# Patient Record
Sex: MALE | Race: WHITE | Employment: FULL TIME | URBAN - METROPOLITAN AREA
[De-identification: names, ages, dates, MRNs, and addresses within clinical notes are randomized per-mention and may not be internally consistent; named-entity substitution may affect disease eponyms.]

---

## 2017-02-13 ENCOUNTER — DIABETIC EDUCATION (OUTPATIENT)
Dept: ENDOCRINOLOGY CLINIC | Facility: CLINIC | Age: 52
End: 2017-02-13

## 2017-02-13 VITALS — HEIGHT: 71 IN | WEIGHT: 215 LBS | BODY MASS INDEX: 30.1 KG/M2

## 2017-02-13 DIAGNOSIS — E10.9 TYPE 1 DIABETES MELLITUS WITHOUT COMPLICATION (HCC): Primary | ICD-10-CM

## 2017-02-13 PROCEDURE — 97802 MEDICAL NUTRITION INDIV IN: CPT | Performed by: DIETITIAN, REGISTERED

## 2017-02-13 NOTE — PROGRESS NOTES
Trey Jesus  [id number] was seen for Diabetic Medical Nutrition Therapy:    Date: 2/13/2017  Start time: 10:30 End time: 11:30    Assessment: Ht 71\"  Wt 215 lb  BMI 30.00 kg/m2    HGBA1C (%)   Date Value   03/29/2013 8.6*   ----------    Type 1 diabe

## 2017-02-23 ENCOUNTER — HOSPITAL (OUTPATIENT)
Dept: OTHER | Age: 52
End: 2017-02-23
Attending: EMERGENCY MEDICINE

## 2017-02-23 LAB
GLUCOSE BLDC GLUCOMTR-MCNC: 275 MG/DL (ref 65–99)
GLUCOSE BLDC GLUCOMTR-MCNC: 336 MG/DL (ref 65–99)

## 2017-03-06 ENCOUNTER — DIABETIC EDUCATION (OUTPATIENT)
Dept: ENDOCRINOLOGY CLINIC | Facility: CLINIC | Age: 52
End: 2017-03-06

## 2017-03-06 DIAGNOSIS — E10.9 TYPE 1 DIABETES MELLITUS WITHOUT COMPLICATION (HCC): Primary | ICD-10-CM

## 2017-03-06 PROCEDURE — G0108 DIAB MANAGE TRN  PER INDIV: HCPCS | Performed by: DIETITIAN, REGISTERED

## 2017-03-06 NOTE — PROGRESS NOTES
Karsten Briseno  [id number] was seen for Saline Start:    Date: 3/6/2017  Start time: 9:00    End time: 11:30    Insulin pump:  Minimed 630G           SN [id number]  Contour Link meter    SN [id number]    Basic programming of all settings reviewed and en

## 2017-04-03 ENCOUNTER — HOSPITAL ENCOUNTER (OUTPATIENT)
Dept: GENERAL RADIOLOGY | Age: 52
Discharge: HOME | End: 2017-04-03
Attending: FAMILY MEDICINE
Payer: COMMERCIAL

## 2017-04-03 ENCOUNTER — OFFICE VISIT (OUTPATIENT)
Dept: FAMILY MEDICINE CLINIC | Facility: CLINIC | Age: 52
End: 2017-04-03

## 2017-04-03 VITALS
RESPIRATION RATE: 18 BRPM | SYSTOLIC BLOOD PRESSURE: 128 MMHG | HEIGHT: 68 IN | WEIGHT: 223 LBS | BODY MASS INDEX: 33.8 KG/M2 | OXYGEN SATURATION: 98 % | TEMPERATURE: 99 F | DIASTOLIC BLOOD PRESSURE: 78 MMHG | HEART RATE: 78 BPM

## 2017-04-03 DIAGNOSIS — J18.9 PNEUMONIA OF LEFT LOWER LOBE DUE TO INFECTIOUS ORGANISM: ICD-10-CM

## 2017-04-03 DIAGNOSIS — J18.9 PNEUMONIA OF LEFT LOWER LOBE DUE TO INFECTIOUS ORGANISM: Primary | ICD-10-CM

## 2017-04-03 PROCEDURE — 71020 XR CHEST PA + LAT CHEST (CPT=71020): CPT

## 2017-04-03 PROCEDURE — 99213 OFFICE O/P EST LOW 20 MIN: CPT | Performed by: FAMILY MEDICINE

## 2017-04-03 RX ORDER — AZITHROMYCIN 250 MG/1
TABLET, FILM COATED ORAL
Qty: 8 TABLET | Refills: 0 | Status: SHIPPED | OUTPATIENT
Start: 2017-04-03 | End: 2017-04-15 | Stop reason: ALTCHOICE

## 2017-04-03 RX ORDER — BENZONATATE 200 MG/1
200 CAPSULE ORAL EVERY 8 HOURS PRN
Qty: 30 CAPSULE | Refills: 0 | Status: SHIPPED | OUTPATIENT
Start: 2017-04-03 | End: 2017-04-15 | Stop reason: ALTCHOICE

## 2017-04-03 RX ORDER — LEVOTHYROXINE SODIUM 175 UG/1
TABLET ORAL
Refills: 5 | COMMUNITY
Start: 2017-03-07 | End: 2020-11-20

## 2017-04-03 NOTE — PROGRESS NOTES
HPI:    Patient ID: Angi Colon is a 49year old male. Cough  This is a new problem. Episode onset: 2 wks. The problem has been gradually worsening. The problem occurs every few minutes. The cough is non-productive.  Associated symptoms include chills Left eye exhibits no discharge. No scleral icterus. Neck: Normal range of motion. Neck supple. No thyromegaly present. Cardiovascular: Normal rate, regular rhythm and normal heart sounds. Pulmonary/Chest: Effort normal. No respiratory distress.  He h

## 2017-04-15 ENCOUNTER — OFFICE VISIT (OUTPATIENT)
Dept: FAMILY MEDICINE CLINIC | Facility: CLINIC | Age: 52
End: 2017-04-15

## 2017-04-15 VITALS
SYSTOLIC BLOOD PRESSURE: 116 MMHG | DIASTOLIC BLOOD PRESSURE: 68 MMHG | TEMPERATURE: 98 F | OXYGEN SATURATION: 98 % | HEART RATE: 82 BPM | RESPIRATION RATE: 18 BRPM | WEIGHT: 222 LBS | BODY MASS INDEX: 34 KG/M2

## 2017-04-15 DIAGNOSIS — J18.9 PNEUMONIA DUE TO INFECTIOUS ORGANISM, UNSPECIFIED LATERALITY, UNSPECIFIED PART OF LUNG: Primary | ICD-10-CM

## 2017-04-15 DIAGNOSIS — M65.331 TRIGGER MIDDLE FINGER OF RIGHT HAND: ICD-10-CM

## 2017-04-15 PROCEDURE — 99213 OFFICE O/P EST LOW 20 MIN: CPT | Performed by: FAMILY MEDICINE

## 2017-04-15 NOTE — PROGRESS NOTES
HPI:    Patient ID: Bon Goodwin is a 49year old male. Pneumonia  He complains of cough. This is a new problem. The current episode started 1 to 4 weeks ago. The problem has been resolved. The cough is productive of sputum.  Associated symptoms commen PHYSICAL EXAM:   Physical Exam   Constitutional: He appears well-developed and well-nourished. No distress. Eyes: Conjunctivae are normal. Right eye exhibits no discharge. Left eye exhibits no discharge. No scleral icterus.    Cardiovascular: Normal rat

## 2017-04-15 NOTE — PATIENT INSTRUCTIONS
What is Trigger Finger? Trigger finger is an inflammation of tissue inside your finger or thumb. It is also called tenosynovitis (ten-oh-sin-oh-VY-tis).  Tendons (cordlike fibers that attach muscle to bone and allow you to bend the joints) become swollen © 0930-8061 [address], [address] [place]. All rights reserved. This information is not intended as a substitute for professional medical care. Always follow your healthcare professional's instructions.         Viviane © 2752-6954 [address], [address] [place]. All rights reserved. This information is not intended as a substitute for professional medical care. Always follow your healthcare professional's instructions.

## 2017-04-24 ENCOUNTER — TELEPHONE (OUTPATIENT)
Dept: FAMILY MEDICINE CLINIC | Facility: CLINIC | Age: 52
End: 2017-04-24

## 2017-04-24 NOTE — TELEPHONE ENCOUNTER
Please call patient. Due for diabetes follow up appointment with [facility].  Also needs fasting lab work, foot exam and diabetic eye exam.

## 2017-06-12 NOTE — TELEPHONE ENCOUNTER
Pt has appt with Dr Ned Hancock on Wednesday 6/14. He will ask the eye doctor to fax over his report.   He is coming in to see Dr Fani Fernandez on 6/14/17

## 2017-06-14 ENCOUNTER — HOSPITAL ENCOUNTER (OUTPATIENT)
Dept: GENERAL RADIOLOGY | Age: 52
Discharge: HOME | End: 2017-06-14
Attending: FAMILY MEDICINE
Payer: COMMERCIAL

## 2017-06-14 ENCOUNTER — MED REC SCAN ONLY (OUTPATIENT)
Dept: FAMILY MEDICINE CLINIC | Facility: CLINIC | Age: 52
End: 2017-06-14

## 2017-06-14 ENCOUNTER — OFFICE VISIT (OUTPATIENT)
Dept: FAMILY MEDICINE CLINIC | Facility: CLINIC | Age: 52
End: 2017-06-14

## 2017-06-14 VITALS
TEMPERATURE: 99 F | SYSTOLIC BLOOD PRESSURE: 122 MMHG | DIASTOLIC BLOOD PRESSURE: 82 MMHG | RESPIRATION RATE: 16 BRPM | HEART RATE: 72 BPM | WEIGHT: 223 LBS | BODY MASS INDEX: 33.8 KG/M2 | HEIGHT: 68 IN

## 2017-06-14 DIAGNOSIS — E55.9 VITAMIN D DEFICIENCY: ICD-10-CM

## 2017-06-14 DIAGNOSIS — M65.341 TRIGGER RING FINGER OF RIGHT HAND: ICD-10-CM

## 2017-06-14 DIAGNOSIS — M54.16 LUMBAR RADICULOPATHY, CHRONIC: ICD-10-CM

## 2017-06-14 DIAGNOSIS — M65.341 TRIGGER RING FINGER OF RIGHT HAND: Primary | ICD-10-CM

## 2017-06-14 PROCEDURE — 73140 X-RAY EXAM OF FINGER(S): CPT | Performed by: FAMILY MEDICINE

## 2017-06-14 PROCEDURE — 99214 OFFICE O/P EST MOD 30 MIN: CPT | Performed by: FAMILY MEDICINE

## 2017-06-16 NOTE — PROGRESS NOTES
HPI:    Patient ID: Kayleigh Bear is a 49year old male. HPI Comments: + vit d def in the past.  Was taking vit D. But then stopped. No bone pain. No depressed mood. Finger Pain   Pain location: right 3rd finger. This is a chronic problem.  The cu discharge. Left eye exhibits no discharge. No scleral icterus. Neck: Normal range of motion. Neck supple. No thyromegaly present. Cardiovascular: Normal rate, regular rhythm and normal heart sounds.     Pulmonary/Chest: Effort normal and breath sounds n

## 2017-07-14 NOTE — ADDENDUM NOTE
Encounter addended by: Lupe Tee MA on: 7/14/2017  8:45 AM<BR>    Actions taken: Letter status changed

## 2017-07-27 LAB — VITAMIN D, 25-OH, TOTAL: 29 NG/ML (ref 30–100)

## 2018-11-30 ENCOUNTER — PATIENT OUTREACH (OUTPATIENT)
Dept: FAMILY MEDICINE CLINIC | Facility: CLINIC | Age: 53
End: 2018-11-30

## 2018-12-05 NOTE — PROGRESS NOTES
Spoke to patient to ask about eye exam this year. He states he had it done but doesn't remember where he had it done. Asked him to call back with information when he remembers.

## 2019-02-20 ENCOUNTER — PATIENT OUTREACH (OUTPATIENT)
Dept: FAMILY MEDICINE CLINIC | Facility: CLINIC | Age: 54
End: 2019-02-20

## 2019-11-27 ENCOUNTER — TELEPHONE (OUTPATIENT)
Dept: FAMILY MEDICINE CLINIC | Facility: CLINIC | Age: 54
End: 2019-11-27

## 2019-11-27 NOTE — TELEPHONE ENCOUNTER
Pt states for the past few weeks he has had SOB with exertion, bilateral knee and calf pain and pain in one heal.  No chest pain no warmth or redness. Pt would like a Saturday appt.  APpt scheduled for 12/7 advised to ER with worsening SOB, increased calf

## 2019-12-07 ENCOUNTER — APPOINTMENT (OUTPATIENT)
Dept: LAB | Age: 54
End: 2019-12-07
Attending: PHYSICIAN ASSISTANT
Payer: COMMERCIAL

## 2019-12-07 ENCOUNTER — OFFICE VISIT (OUTPATIENT)
Dept: FAMILY MEDICINE CLINIC | Facility: CLINIC | Age: 54
End: 2019-12-07
Payer: COMMERCIAL

## 2019-12-07 VITALS
WEIGHT: 232 LBS | OXYGEN SATURATION: 98 % | RESPIRATION RATE: 18 BRPM | SYSTOLIC BLOOD PRESSURE: 138 MMHG | DIASTOLIC BLOOD PRESSURE: 78 MMHG | HEIGHT: 68 IN | BODY MASS INDEX: 35.16 KG/M2 | HEART RATE: 72 BPM | TEMPERATURE: 98 F

## 2019-12-07 DIAGNOSIS — I87.2 VENOUS INSUFFICIENCY OF BOTH LOWER EXTREMITIES: ICD-10-CM

## 2019-12-07 DIAGNOSIS — R06.00 DYSPNEA ON EXERTION: ICD-10-CM

## 2019-12-07 DIAGNOSIS — I73.9 CLAUDICATION (HCC): ICD-10-CM

## 2019-12-07 DIAGNOSIS — E10.9 TYPE 1 DIABETES MELLITUS WITHOUT COMPLICATION (HCC): Primary | ICD-10-CM

## 2019-12-07 DIAGNOSIS — E03.9 HYPOTHYROIDISM, UNSPECIFIED TYPE: ICD-10-CM

## 2019-12-07 PROCEDURE — 99214 OFFICE O/P EST MOD 30 MIN: CPT | Performed by: PHYSICIAN ASSISTANT

## 2019-12-07 PROCEDURE — 36415 COLL VENOUS BLD VENIPUNCTURE: CPT | Performed by: PHYSICIAN ASSISTANT

## 2019-12-07 PROCEDURE — 84439 ASSAY OF FREE THYROXINE: CPT | Performed by: PHYSICIAN ASSISTANT

## 2019-12-07 PROCEDURE — 83036 HEMOGLOBIN GLYCOSYLATED A1C: CPT | Performed by: PHYSICIAN ASSISTANT

## 2019-12-07 PROCEDURE — 82043 UR ALBUMIN QUANTITATIVE: CPT | Performed by: PHYSICIAN ASSISTANT

## 2019-12-07 PROCEDURE — 82570 ASSAY OF URINE CREATININE: CPT | Performed by: PHYSICIAN ASSISTANT

## 2019-12-07 PROCEDURE — 82607 VITAMIN B-12: CPT | Performed by: PHYSICIAN ASSISTANT

## 2019-12-07 PROCEDURE — 80050 GENERAL HEALTH PANEL: CPT | Performed by: PHYSICIAN ASSISTANT

## 2019-12-07 PROCEDURE — 84481 FREE ASSAY (FT-3): CPT | Performed by: PHYSICIAN ASSISTANT

## 2019-12-07 PROCEDURE — 80061 LIPID PANEL: CPT | Performed by: PHYSICIAN ASSISTANT

## 2019-12-07 NOTE — PROGRESS NOTES
HPI:    Patient ID: Blayne Kelley is a 49year old male. HPI   Patient with history of type 1 diabetes and hypothyroidism presents to discuss a few concerns. For over the last year he has had bilateral calf pain and swelling.   It is getting worse over knees). Negative for gait problem. Skin: Positive for color change (hyperpigmented macules and confulent rash of the bilateral lower legs) and hair loss. Neurological: Negative for dizziness, syncope, weakness, light-headedness and headaches.    Hematol laxity. Tenderness found. Medial joint line tenderness noted. Left knee: He exhibits normal range of motion, no swelling and no effusion. No tenderness found. Right lower leg: He exhibits tenderness. Edema present.       Left lower leg: He exhibit WITH COMPRESS EXERCISE (CPT=93924,31957); Future  - US ANKLE TOE BRACHIAL INDEX BILATERAL (CPT=93922); Future    4.  Venous insufficiency of both lower extremities  Patient also has pitting edema bilaterally and hyperpigmentation of the skin consistent with

## 2019-12-12 ENCOUNTER — HOSPITAL ENCOUNTER (OUTPATIENT)
Dept: ULTRASOUND IMAGING | Facility: HOSPITAL | Age: 54
Discharge: HOME | End: 2019-12-12
Attending: PHYSICIAN ASSISTANT
Payer: COMMERCIAL

## 2019-12-12 DIAGNOSIS — I73.9 CLAUDICATION (HCC): ICD-10-CM

## 2019-12-12 PROCEDURE — 93923 UPR/LXTR ART STDY 3+ LVLS: CPT | Performed by: PHYSICIAN ASSISTANT

## 2019-12-14 ENCOUNTER — HOSPITAL ENCOUNTER (OUTPATIENT)
Dept: CV DIAGNOSTICS | Facility: HOSPITAL | Age: 54
Discharge: HOME | End: 2019-12-14
Attending: PHYSICIAN ASSISTANT
Payer: COMMERCIAL

## 2019-12-14 DIAGNOSIS — R06.00 DYSPNEA ON EXERTION: ICD-10-CM

## 2019-12-14 PROCEDURE — 93017 CV STRESS TEST TRACING ONLY: CPT | Performed by: PHYSICIAN ASSISTANT

## 2019-12-14 PROCEDURE — 93018 CV STRESS TEST I&R ONLY: CPT | Performed by: PHYSICIAN ASSISTANT

## 2019-12-14 PROCEDURE — 78452 HT MUSCLE IMAGE SPECT MULT: CPT | Performed by: PHYSICIAN ASSISTANT

## 2019-12-18 ENCOUNTER — PATIENT MESSAGE (OUTPATIENT)
Dept: FAMILY MEDICINE CLINIC | Facility: CLINIC | Age: 54
End: 2019-12-18

## 2019-12-18 NOTE — TELEPHONE ENCOUNTER
From: Missy Mixon  To: Jasmeet Madrigal PA-C  Sent: 12/18/2019 12:06 PM CST  Subject: Test Results Question    Any updates on the testing from Saturday   Thank you

## 2020-01-14 ENCOUNTER — APPOINTMENT (OUTPATIENT)
Dept: GENERAL RADIOLOGY | Facility: HOSPITAL | Age: 55
End: 2020-01-14
Payer: COMMERCIAL

## 2020-01-14 ENCOUNTER — HOSPITAL ENCOUNTER (EMERGENCY)
Facility: HOSPITAL | Age: 55
Discharge: HOME | End: 2020-01-15
Attending: EMERGENCY MEDICINE
Payer: COMMERCIAL

## 2020-01-14 ENCOUNTER — APPOINTMENT (OUTPATIENT)
Dept: CT IMAGING | Facility: HOSPITAL | Age: 55
End: 2020-01-14
Attending: EMERGENCY MEDICINE
Payer: COMMERCIAL

## 2020-01-14 DIAGNOSIS — J18.9 COMMUNITY ACQUIRED PNEUMONIA, UNSPECIFIED LATERALITY: Primary | ICD-10-CM

## 2020-01-14 DIAGNOSIS — R09.1 PLEURISY: ICD-10-CM

## 2020-01-14 LAB
ALBUMIN SERPL-MCNC: 3.5 G/DL (ref 3.4–5)
ALBUMIN/GLOB SERPL: 1 {RATIO} (ref 1–2)
ALP LIVER SERPL-CCNC: 150 U/L (ref 45–117)
ALT SERPL-CCNC: 33 U/L (ref 16–61)
ANION GAP SERPL CALC-SCNC: 8 MMOL/L (ref 0–18)
AST SERPL-CCNC: 23 U/L (ref 15–37)
BASOPHILS # BLD AUTO: 0.07 X10(3) UL (ref 0–0.2)
BASOPHILS NFR BLD AUTO: 1 %
BILIRUB SERPL-MCNC: 0.9 MG/DL (ref 0.1–2)
BUN BLD-MCNC: 21 MG/DL (ref 7–18)
BUN/CREAT SERPL: 17.1 (ref 10–20)
CALCIUM BLD-MCNC: 9.6 MG/DL (ref 8.5–10.1)
CHLORIDE SERPL-SCNC: 104 MMOL/L (ref 98–112)
CO2 SERPL-SCNC: 24 MMOL/L (ref 21–32)
CREAT BLD-MCNC: 1.23 MG/DL (ref 0.7–1.3)
D-DIMER: <0.27 UG/ML FEU (ref ?–0.53)
DEPRECATED RDW RBC AUTO: 38.2 FL (ref 35.1–46.3)
EOSINOPHIL # BLD AUTO: 0.11 X10(3) UL (ref 0–0.7)
EOSINOPHIL NFR BLD AUTO: 1.5 %
ERYTHROCYTE [DISTWIDTH] IN BLOOD BY AUTOMATED COUNT: 12.6 % (ref 11–15)
GLOBULIN PLAS-MCNC: 3.4 G/DL (ref 2.8–4.4)
GLUCOSE BLD-MCNC: 302 MG/DL (ref 70–99)
HCT VFR BLD AUTO: 45.5 % (ref 39–53)
HGB BLD-MCNC: 15.6 G/DL (ref 13–17.5)
IMM GRANULOCYTES # BLD AUTO: 0.02 X10(3) UL (ref 0–1)
IMM GRANULOCYTES NFR BLD: 0.3 %
LYMPHOCYTES # BLD AUTO: 0.54 X10(3) UL (ref 1–4)
LYMPHOCYTES NFR BLD AUTO: 7.4 %
M PROTEIN MFR SERPL ELPH: 6.9 G/DL (ref 6.4–8.2)
MCH RBC QN AUTO: 28.5 PG (ref 26–34)
MCHC RBC AUTO-ENTMCNC: 34.3 G/DL (ref 31–37)
MCV RBC AUTO: 83.2 FL (ref 80–100)
MONOCYTES # BLD AUTO: 0.87 X10(3) UL (ref 0.1–1)
MONOCYTES NFR BLD AUTO: 11.9 %
NEUTROPHILS # BLD AUTO: 5.7 X10 (3) UL (ref 1.5–7.7)
NEUTROPHILS # BLD AUTO: 5.7 X10(3) UL (ref 1.5–7.7)
NEUTROPHILS NFR BLD AUTO: 77.9 %
OSMOLALITY SERPL CALC.SUM OF ELEC: 296 MOSM/KG (ref 275–295)
PLATELET # BLD AUTO: 202 10(3)UL (ref 150–450)
POTASSIUM SERPL-SCNC: 4 MMOL/L (ref 3.5–5.1)
RBC # BLD AUTO: 5.47 X10(6)UL (ref 4.3–5.7)
SODIUM SERPL-SCNC: 136 MMOL/L (ref 136–145)
TROPONIN I SERPL-MCNC: <0.045 NG/ML (ref ?–0.04)
WBC # BLD AUTO: 7.3 X10(3) UL (ref 4–11)

## 2020-01-14 PROCEDURE — 93005 ELECTROCARDIOGRAM TRACING: CPT

## 2020-01-14 PROCEDURE — 71045 X-RAY EXAM CHEST 1 VIEW: CPT | Performed by: EMERGENCY MEDICINE

## 2020-01-14 PROCEDURE — 36415 COLL VENOUS BLD VENIPUNCTURE: CPT

## 2020-01-14 PROCEDURE — 80053 COMPREHEN METABOLIC PANEL: CPT

## 2020-01-14 PROCEDURE — 85025 COMPLETE CBC W/AUTO DIFF WBC: CPT

## 2020-01-14 PROCEDURE — 99285 EMERGENCY DEPT VISIT HI MDM: CPT

## 2020-01-14 PROCEDURE — 71275 CT ANGIOGRAPHY CHEST: CPT | Performed by: EMERGENCY MEDICINE

## 2020-01-14 PROCEDURE — 85025 COMPLETE CBC W/AUTO DIFF WBC: CPT | Performed by: EMERGENCY MEDICINE

## 2020-01-14 PROCEDURE — 84484 ASSAY OF TROPONIN QUANT: CPT

## 2020-01-14 PROCEDURE — 84484 ASSAY OF TROPONIN QUANT: CPT | Performed by: EMERGENCY MEDICINE

## 2020-01-14 PROCEDURE — 93010 ELECTROCARDIOGRAM REPORT: CPT

## 2020-01-14 PROCEDURE — 80053 COMPREHEN METABOLIC PANEL: CPT | Performed by: EMERGENCY MEDICINE

## 2020-01-14 PROCEDURE — 85379 FIBRIN DEGRADATION QUANT: CPT | Performed by: EMERGENCY MEDICINE

## 2020-01-15 VITALS
BODY MASS INDEX: 32.2 KG/M2 | HEIGHT: 71 IN | HEART RATE: 97 BPM | RESPIRATION RATE: 20 BRPM | TEMPERATURE: 100 F | OXYGEN SATURATION: 95 % | SYSTOLIC BLOOD PRESSURE: 133 MMHG | WEIGHT: 230 LBS | DIASTOLIC BLOOD PRESSURE: 71 MMHG

## 2020-01-15 LAB
ATRIAL RATE: 100 BPM
P AXIS: 46 DEGREES
P-R INTERVAL: 152 MS
Q-T INTERVAL: 328 MS
QRS DURATION: 84 MS
QTC CALCULATION (BEZET): 423 MS
R AXIS: 8 DEGREES
T AXIS: 25 DEGREES
VENTRICULAR RATE: 100 BPM

## 2020-01-15 RX ORDER — PREDNISONE 20 MG/1
40 TABLET ORAL DAILY
Qty: 10 TABLET | Refills: 0 | Status: SHIPPED | OUTPATIENT
Start: 2020-01-15 | End: 2020-01-20

## 2020-01-15 RX ORDER — LEVOFLOXACIN 750 MG/1
750 TABLET ORAL DAILY
Qty: 10 TABLET | Refills: 0 | Status: SHIPPED | OUTPATIENT
Start: 2020-01-15 | End: 2020-01-25 | Stop reason: ALTCHOICE

## 2020-01-15 RX ORDER — ALBUTEROL SULFATE 2.5 MG/3ML
2.5 SOLUTION RESPIRATORY (INHALATION) ONCE
Status: DISCONTINUED | OUTPATIENT
Start: 2020-01-15 | End: 2020-01-15

## 2020-01-15 RX ORDER — ALBUTEROL SULFATE 90 UG/1
2 AEROSOL, METERED RESPIRATORY (INHALATION) EVERY 4 HOURS PRN
Qty: 1 INHALER | Refills: 0 | Status: SHIPPED | OUTPATIENT
Start: 2020-01-15 | End: 2020-02-14

## 2020-01-15 NOTE — ED INITIAL ASSESSMENT (HPI)
A/O x 4. Daughter at bedside. Ambulatory. Patient presents with chest pain, shortness of breath, bilateral leg pain, blood in stool, and elevated accuchecks. Patient is Type 1 DM, insulin pump in place.   Reports that glucose has been running above 400 to

## 2020-01-15 NOTE — ED PROVIDER NOTES
Patient Seen in: [facility] Emergency Department      History   Patient presents with:  Chest Pain Angina    Stated Complaint: chest pain, leg pain, high sugar, blood in stool    HPI    52-year-old gentleman with a history of diabetes coming for arelis cm (5' 11\")   Wt 104.3 kg   SpO2 95%   BMI 32.08 kg/m²         Physical Exam  Vitals signs and nursing note reviewed. Constitutional:       General: He is not in acute distress. Appearance: He is well-developed. He is not toxic-appearing.    HENT: following orders were created for panel order CBC WITH DIFFERENTIAL WITH PLATELET.   Procedure                               Abnormality         Status                     ---------                               -----------         ------ along with     bronchial wall thickening seen in the right and left lower lobes, and     milder opacities in the lingula. No     pleural effusion. No pneumothorax         LUNGS/PLEURA:  No acute process.          THORACIC AORTA:  No thoracic aortic aneury MDM     Patient was brought back to the examination room immediately. The patient was then placed on a cardiac monitor and pulse oximetry was applied. Patient had an IV established and labs were drawn.     Patient EKG does not show

## 2020-01-17 ENCOUNTER — APPOINTMENT (OUTPATIENT)
Dept: LAB | Age: 55
End: 2020-01-17
Attending: FAMILY MEDICINE
Payer: COMMERCIAL

## 2020-01-17 ENCOUNTER — OFFICE VISIT (OUTPATIENT)
Dept: FAMILY MEDICINE CLINIC | Facility: CLINIC | Age: 55
End: 2020-01-17
Payer: COMMERCIAL

## 2020-01-17 VITALS
HEIGHT: 71 IN | TEMPERATURE: 98 F | DIASTOLIC BLOOD PRESSURE: 72 MMHG | WEIGHT: 230 LBS | RESPIRATION RATE: 20 BRPM | SYSTOLIC BLOOD PRESSURE: 134 MMHG | OXYGEN SATURATION: 98 % | BODY MASS INDEX: 32.2 KG/M2 | HEART RATE: 78 BPM

## 2020-01-17 DIAGNOSIS — M79.605 CHRONIC PAIN OF BOTH LOWER EXTREMITIES: ICD-10-CM

## 2020-01-17 DIAGNOSIS — J18.9 PNEUMONIA OF RIGHT LUNG DUE TO INFECTIOUS ORGANISM, UNSPECIFIED PART OF LUNG: Primary | ICD-10-CM

## 2020-01-17 DIAGNOSIS — M79.604 CHRONIC PAIN OF BOTH LOWER EXTREMITIES: ICD-10-CM

## 2020-01-17 DIAGNOSIS — G89.29 CHRONIC PAIN OF BOTH LOWER EXTREMITIES: ICD-10-CM

## 2020-01-17 LAB
ALBUMIN SERPL-MCNC: 3.2 G/DL (ref 3.4–5)
ALBUMIN/GLOB SERPL: 1 {RATIO} (ref 1–2)
ALP LIVER SERPL-CCNC: 109 U/L (ref 45–117)
ALT SERPL-CCNC: 29 U/L (ref 16–61)
ANION GAP SERPL CALC-SCNC: 5 MMOL/L (ref 0–18)
AST SERPL-CCNC: 24 U/L (ref 15–37)
BILIRUB SERPL-MCNC: 0.6 MG/DL (ref 0.1–2)
BUN BLD-MCNC: 27 MG/DL (ref 7–18)
BUN/CREAT SERPL: 23.7 (ref 10–20)
CALCIUM BLD-MCNC: 9.3 MG/DL (ref 8.5–10.1)
CHLORIDE SERPL-SCNC: 110 MMOL/L (ref 98–112)
CK SERPL-CCNC: 211 U/L (ref 39–308)
CO2 SERPL-SCNC: 26 MMOL/L (ref 21–32)
CREAT BLD-MCNC: 1.14 MG/DL (ref 0.7–1.3)
DEPRECATED HBV CORE AB SER IA-ACNC: 311.9 NG/ML (ref 30–530)
GLOBULIN PLAS-MCNC: 3.1 G/DL (ref 2.8–4.4)
GLUCOSE BLD-MCNC: 303 MG/DL (ref 70–99)
HAV IGM SER QL: 1.7 MG/DL (ref 1.6–2.6)
M PROTEIN MFR SERPL ELPH: 6.3 G/DL (ref 6.4–8.2)
OSMOLALITY SERPL CALC.SUM OF ELEC: 308 MOSM/KG (ref 275–295)
PATIENT FASTING Y/N/NP: NO
POTASSIUM SERPL-SCNC: 3.9 MMOL/L (ref 3.5–5.1)
SODIUM SERPL-SCNC: 141 MMOL/L (ref 136–145)
T4 FREE SERPL-MCNC: 1.6 NG/DL (ref 0.8–1.7)
TSI SER-ACNC: 0.37 MIU/ML (ref 0.36–3.74)

## 2020-01-17 PROCEDURE — 83876 ASSAY MYELOPEROXIDASE: CPT | Performed by: FAMILY MEDICINE

## 2020-01-17 PROCEDURE — 36415 COLL VENOUS BLD VENIPUNCTURE: CPT | Performed by: FAMILY MEDICINE

## 2020-01-17 PROCEDURE — 82550 ASSAY OF CK (CPK): CPT | Performed by: FAMILY MEDICINE

## 2020-01-17 PROCEDURE — 84439 ASSAY OF FREE THYROXINE: CPT | Performed by: FAMILY MEDICINE

## 2020-01-17 PROCEDURE — 99213 OFFICE O/P EST LOW 20 MIN: CPT | Performed by: FAMILY MEDICINE

## 2020-01-17 PROCEDURE — 86255 FLUORESCENT ANTIBODY SCREEN: CPT | Performed by: FAMILY MEDICINE

## 2020-01-17 PROCEDURE — 83735 ASSAY OF MAGNESIUM: CPT | Performed by: FAMILY MEDICINE

## 2020-01-17 PROCEDURE — 80053 COMPREHEN METABOLIC PANEL: CPT | Performed by: FAMILY MEDICINE

## 2020-01-17 PROCEDURE — 84443 ASSAY THYROID STIM HORMONE: CPT | Performed by: FAMILY MEDICINE

## 2020-01-17 PROCEDURE — 82728 ASSAY OF FERRITIN: CPT | Performed by: FAMILY MEDICINE

## 2020-01-17 PROCEDURE — 83516 IMMUNOASSAY NONANTIBODY: CPT | Performed by: FAMILY MEDICINE

## 2020-01-17 RX ORDER — AZITHROMYCIN 250 MG/1
TABLET, FILM COATED ORAL
Qty: 9 TABLET | Refills: 0 | Status: SHIPPED | OUTPATIENT
Start: 2020-01-17 | End: 2020-01-25 | Stop reason: ALTCHOICE

## 2020-01-17 NOTE — PROGRESS NOTES
HPI:    Patient ID: James Dumont is a 49year old male. Pneumonia   He complains of cough. This is a new problem. Episode onset: Visited ED and was dx with right-sided pneumonia on 1/14/2020. The problem occurs constantly.  The problem has been waxing an tablet 0   • predniSONE 20 MG Oral Tab Take 2 tablets (40 mg total) by mouth daily for 5 days. 10 tablet 0   • Albuterol Sulfate  (90 Base) MCG/ACT Inhalation Aero Soln Inhale 2 puffs into the lungs every 4 (four) hours as needed for Wheezing.  1 Inh check on lung abnormality seen with recent imaging.   - azithromycin 250 MG Oral Tab; Take two tablets by mouth daily x 4 days, then one daily. Dispense: 9 tablet; Refill: 0    2.  Chronic pain of both lower extremities  If blood work returns normal than o

## 2020-01-21 LAB
MYELOPEROX ANTIBODIES, IGG: 0 AU/ML
SERINE PROTEASE3, IGG: 1 AU/ML

## 2020-01-25 ENCOUNTER — HOSPITAL ENCOUNTER (OUTPATIENT)
Dept: GENERAL RADIOLOGY | Age: 55
Discharge: HOME | End: 2020-01-25
Attending: FAMILY MEDICINE
Payer: COMMERCIAL

## 2020-01-25 ENCOUNTER — OFFICE VISIT (OUTPATIENT)
Dept: FAMILY MEDICINE CLINIC | Facility: CLINIC | Age: 55
End: 2020-01-25
Payer: COMMERCIAL

## 2020-01-25 VITALS
RESPIRATION RATE: 18 BRPM | OXYGEN SATURATION: 98 % | BODY MASS INDEX: 31.36 KG/M2 | HEART RATE: 77 BPM | SYSTOLIC BLOOD PRESSURE: 118 MMHG | TEMPERATURE: 98 F | HEIGHT: 71 IN | WEIGHT: 224 LBS | DIASTOLIC BLOOD PRESSURE: 64 MMHG

## 2020-01-25 DIAGNOSIS — M54.16 CHRONIC LUMBAR RADICULOPATHY: ICD-10-CM

## 2020-01-25 DIAGNOSIS — J18.9 PNEUMONIA OF RIGHT UPPER LOBE DUE TO INFECTIOUS ORGANISM: Primary | ICD-10-CM

## 2020-01-25 DIAGNOSIS — E03.9 HYPOTHYROIDISM, UNSPECIFIED TYPE: ICD-10-CM

## 2020-01-25 DIAGNOSIS — E10.9 TYPE 1 DIABETES MELLITUS WITHOUT COMPLICATION (HCC): ICD-10-CM

## 2020-01-25 DIAGNOSIS — J18.9 PNEUMONIA OF RIGHT UPPER LOBE DUE TO INFECTIOUS ORGANISM: ICD-10-CM

## 2020-01-25 PROCEDURE — 71046 X-RAY EXAM CHEST 2 VIEWS: CPT | Performed by: FAMILY MEDICINE

## 2020-01-25 PROCEDURE — 99214 OFFICE O/P EST MOD 30 MIN: CPT | Performed by: FAMILY MEDICINE

## 2020-01-25 NOTE — PROGRESS NOTES
HPI:    Patient ID: Mackenzie Leslie is a 49year old male. Pneumonia   He complains of cough. This is a new problem. Episode onset: Visited ED and was dx with right-sided pneumonia on 1/14/2020. The problem occurs constantly.  The problem has been gradually comments: + weakness. The symptoms are aggravated by activity. Treatments tried: compression socks at night. The treatment provided mild relief. His past medical history is significant for diabetes.    Thyroid Problem   This is a chronic (+ hypothyroidism) Cardiovascular: Normal rate, regular rhythm and normal heart sounds. Pulmonary/Chest: Effort normal and breath sounds normal. No respiratory distress. He has no wheezes. Lungs sound clear   Lymphadenopathy:     He has no cervical adenopathy.    Skin:

## 2020-02-22 ENCOUNTER — OFFICE VISIT (OUTPATIENT)
Dept: FAMILY MEDICINE CLINIC | Facility: CLINIC | Age: 55
End: 2020-02-22
Payer: COMMERCIAL

## 2020-02-22 VITALS
RESPIRATION RATE: 20 BRPM | BODY MASS INDEX: 33.21 KG/M2 | TEMPERATURE: 98 F | HEART RATE: 76 BPM | DIASTOLIC BLOOD PRESSURE: 70 MMHG | HEIGHT: 70 IN | SYSTOLIC BLOOD PRESSURE: 120 MMHG | OXYGEN SATURATION: 97 % | WEIGHT: 232 LBS

## 2020-02-22 DIAGNOSIS — R05.9 COUGH IN ADULT: Primary | ICD-10-CM

## 2020-02-22 PROCEDURE — 99213 OFFICE O/P EST LOW 20 MIN: CPT | Performed by: NURSE PRACTITIONER

## 2020-02-22 RX ORDER — BENZONATATE 200 MG/1
200 CAPSULE ORAL 3 TIMES DAILY PRN
Qty: 30 CAPSULE | Refills: 0 | Status: SHIPPED | OUTPATIENT
Start: 2020-02-22 | End: 2020-03-03

## 2020-02-22 NOTE — PROGRESS NOTES
HPI:   Karin Gastelum is a 49year old male who presents with ill symptoms for  1  weeks. Patient reports history of pneumonia which resolved per confirmed xray in late January, here today for cough, congestion, headache, mild body aches.  Has tried Motrin GENERAL: well developed, well nourished,in no apparent distress  HEENT: atraumatic, normocephalic,ears clear, nares with mild rhinorrhea, throat normal appearing. Uvuvla midline. No sinus tenderness with palpation.   NECK: supple,positive anterior cervic breathe or if symptoms improve greatly then worsen again.

## 2020-04-30 RX ORDER — LEVOTHYROXINE SODIUM 150 UG/1
TABLET ORAL
Qty: 30 TABLET | Refills: 0 | OUTPATIENT
Start: 2020-04-30

## 2020-07-25 ENCOUNTER — OFFICE VISIT (OUTPATIENT)
Dept: FAMILY MEDICINE CLINIC | Facility: CLINIC | Age: 55
End: 2020-07-25
Payer: COMMERCIAL

## 2020-07-25 VITALS
WEIGHT: 241 LBS | BODY MASS INDEX: 34.5 KG/M2 | SYSTOLIC BLOOD PRESSURE: 136 MMHG | RESPIRATION RATE: 16 BRPM | HEART RATE: 62 BPM | DIASTOLIC BLOOD PRESSURE: 86 MMHG | HEIGHT: 70 IN | TEMPERATURE: 98 F | OXYGEN SATURATION: 97 %

## 2020-07-25 DIAGNOSIS — L20.9 ATOPIC DERMATITIS, UNSPECIFIED TYPE: ICD-10-CM

## 2020-07-25 DIAGNOSIS — E10.9 TYPE 1 DIABETES MELLITUS WITHOUT COMPLICATION (HCC): ICD-10-CM

## 2020-07-25 DIAGNOSIS — E03.9 HYPOTHYROIDISM, UNSPECIFIED TYPE: ICD-10-CM

## 2020-07-25 DIAGNOSIS — Z23 NEED FOR PNEUMOCOCCAL VACCINATION: ICD-10-CM

## 2020-07-25 DIAGNOSIS — G47.30 SLEEP APNEA, UNSPECIFIED TYPE: ICD-10-CM

## 2020-07-25 DIAGNOSIS — Z00.00 ANNUAL PHYSICAL EXAM: Primary | ICD-10-CM

## 2020-07-25 PROCEDURE — 3075F SYST BP GE 130 - 139MM HG: CPT | Performed by: FAMILY MEDICINE

## 2020-07-25 PROCEDURE — 3008F BODY MASS INDEX DOCD: CPT | Performed by: FAMILY MEDICINE

## 2020-07-25 PROCEDURE — 99396 PREV VISIT EST AGE 40-64: CPT | Performed by: FAMILY MEDICINE

## 2020-07-25 PROCEDURE — 99212 OFFICE O/P EST SF 10 MIN: CPT | Performed by: FAMILY MEDICINE

## 2020-07-25 PROCEDURE — 3079F DIAST BP 80-89 MM HG: CPT | Performed by: FAMILY MEDICINE

## 2020-07-25 NOTE — H&P
Natalee Calderon is a 49year old male who presents for a complete physical exam.   HPI:     Diabetes   He presents for his follow-up diabetic visit. He has type 1 (dx 24 years ago) diabetes mellitus.  His disease course has been worsening (12/7/19 a1c of 9.4 • NOVOLOG FLEXPEN 100 UNIT/ML Subcutaneous Solution Inject  into the skin 3 (three) times daily before meals.  Indications: Insulin-Dependent Diabetes     • ASCENSIA MICROFILL TEST In Vitro Strip TEST SIX TIMES DAILY 200 Strip 2      Past Medical History: BMI 34.58 kg/m²       Body mass index is 34.58 kg/m².      GENERAL: well developed, well nourished,in no apparent distress  SKIN: no rashes,no suspicious lesions  HEENT: atraumatic, normocephalic,TMs clear, posterior pharynx clear  EYES: PERRLA,conjunctiva at this time. Follow up based on results to discuss if current dosage of levothyroxine is correct. 4. Sleep apnea, unspecified type  Pt is highly advised to complete sleep apnea testing at this time.    - OP REFERRAL TO DIAGNOSTIC SLEEP STUDY  - GENERAL

## 2020-07-28 LAB
ABSOLUTE BASOPHILS: 83 CELLS/UL (ref 0–200)
ABSOLUTE EOSINOPHILS: 218 CELLS/UL (ref 15–500)
ABSOLUTE LYMPHOCYTES: 1109 CELLS/UL (ref 850–3900)
ABSOLUTE MONOCYTES: 507 CELLS/UL (ref 200–950)
ABSOLUTE NEUTROPHILS: 3983 CELLS/UL (ref 1500–7800)
ALBUMIN/GLOBULIN RATIO: 1.9 (CALC) (ref 1–2.5)
ALBUMIN: 4.1 G/DL (ref 3.6–5.1)
ALKALINE PHOSPHATASE: 141 U/L (ref 35–144)
ALT: 19 U/L (ref 9–46)
AST: 17 U/L (ref 10–35)
BASOPHILS: 1.4 %
BILIRUBIN, TOTAL: 0.8 MG/DL (ref 0.2–1.2)
BUN: 20 MG/DL (ref 7–25)
CALCIUM: 10.5 MG/DL (ref 8.6–10.3)
CARBON DIOXIDE: 26 MMOL/L (ref 20–32)
CHLORIDE: 105 MMOL/L (ref 98–110)
CHOL/HDLC RATIO: 3.7 (CALC)
CHOLESTEROL, TOTAL: 236 MG/DL
CREATININE, RANDOM URINE: 102 MG/DL (ref 20–320)
CREATININE: 0.96 MG/DL (ref 0.7–1.33)
EGFR IF AFRICN AM: 104 ML/MIN/1.73M2
EGFR IF NONAFRICN AM: 90 ML/MIN/1.73M2
EOSINOPHILS: 3.7 %
GLOBULIN: 2.2 G/DL (CALC) (ref 1.9–3.7)
GLUCOSE: 210 MG/DL (ref 65–99)
HDL CHOLESTEROL: 63 MG/DL
HEMATOCRIT: 46.1 % (ref 38.5–50)
HEMOGLOBIN A1C: 7.8 % OF TOTAL HGB
HEMOGLOBIN: 16 G/DL (ref 13.2–17.1)
LDL-CHOLESTEROL: 152 MG/DL (CALC)
LYMPHOCYTES: 18.8 %
MCH: 29.1 PG (ref 27–33)
MCHC: 34.7 G/DL (ref 32–36)
MCV: 84 FL (ref 80–100)
MICROALBUMIN/CREATININE RATIO, RANDOM URINE: 5 MCG/MG CREAT
MICROALBUMIN: 0.5 MG/DL
MONOCYTES: 8.6 %
MPV: 10.7 FL (ref 7.5–12.5)
NEUTROPHILS: 67.5 %
NON-HDL CHOLESTEROL: 173 MG/DL (CALC)
PLATELET COUNT: 164 THOUSAND/UL (ref 140–400)
POTASSIUM: 4.8 MMOL/L (ref 3.5–5.3)
PROTEIN, TOTAL: 6.3 G/DL (ref 6.1–8.1)
PSA, TOTAL: 0.9 NG/ML
RDW: 12.7 % (ref 11–15)
RED BLOOD CELL COUNT: 5.49 MILLION/UL (ref 4.2–5.8)
SODIUM: 138 MMOL/L (ref 135–146)
TRIGLYCERIDES: 98 MG/DL
TSH W/REFLEX TO FT4: 1.68 MIU/L (ref 0.4–4.5)
VITAMIN D, 25-OH, TOTAL: 27 NG/ML (ref 30–100)
WHITE BLOOD CELL COUNT: 5.9 THOUSAND/UL (ref 3.8–10.8)

## 2020-11-16 ENCOUNTER — OFFICE VISIT (OUTPATIENT)
Dept: ENDOCRINOLOGY CLINIC | Facility: CLINIC | Age: 55
End: 2020-11-16
Payer: COMMERCIAL

## 2020-11-16 VITALS
SYSTOLIC BLOOD PRESSURE: 139 MMHG | WEIGHT: 239.63 LBS | DIASTOLIC BLOOD PRESSURE: 76 MMHG | BODY MASS INDEX: 34 KG/M2 | HEART RATE: 67 BPM

## 2020-11-16 DIAGNOSIS — E89.0 POSTABLATIVE HYPOTHYROIDISM: ICD-10-CM

## 2020-11-16 DIAGNOSIS — E10.65 TYPE 1 DIABETES MELLITUS WITH HYPERGLYCEMIA (HCC): Primary | ICD-10-CM

## 2020-11-16 PROCEDURE — 83036 HEMOGLOBIN GLYCOSYLATED A1C: CPT | Performed by: INTERNAL MEDICINE

## 2020-11-16 PROCEDURE — 3078F DIAST BP <80 MM HG: CPT | Performed by: INTERNAL MEDICINE

## 2020-11-16 PROCEDURE — 36416 COLLJ CAPILLARY BLOOD SPEC: CPT | Performed by: INTERNAL MEDICINE

## 2020-11-16 PROCEDURE — 82947 ASSAY GLUCOSE BLOOD QUANT: CPT | Performed by: INTERNAL MEDICINE

## 2020-11-16 PROCEDURE — 3075F SYST BP GE 130 - 139MM HG: CPT | Performed by: INTERNAL MEDICINE

## 2020-11-16 PROCEDURE — 99243 OFF/OP CNSLTJ NEW/EST LOW 30: CPT | Performed by: INTERNAL MEDICINE

## 2020-11-16 PROCEDURE — 99072 ADDL SUPL MATRL&STAF TM PHE: CPT | Performed by: INTERNAL MEDICINE

## 2020-11-16 NOTE — H&P
Name: Vilma Kessler  Date: 11/16/2020    Referring Physician: No ref.  provider found    HISTORY OF PRESENT ILLNESS   Vilma Kessler is a 48year old male who presents for evaluation and management ofType 1.5 diabetes that was diagnosed when he was in his 175 MCG Oral Tab, TK 1 T PO  D, Disp: , Rfl: 5  •  ASCENSIA MICROFILL TEST In Vitro Strip, TEST SIX TIMES DAILY, Disp: 200 Strip, Rfl: 2  •  ezetimibe 10 MG Oral Tab, Take 1 tablet (10 mg total) by mouth nightly.  (Patient not taking: Reported on 11/16/2020 self, and place  Nutritional:  no abnormal weight gain or loss    Lab Data:   Lab Results   Component Value Date     (H) 12/07/2019    A1C 7.8 (H) 07/27/2020     Lab Results   Component Value Date     (H) 07/27/2020    BUN 20 07/27/2020    BU cardiovascular disease.    - up to date with flu vaccine  - will see Ophtho, needs referral for podiatrist  - Check MAC, lipid panel, CMP    3.) Lifestyle Management for Diabetes- We discussed importance of a low CHO diet, and recommend 45gm per meal or 135

## 2020-11-17 ENCOUNTER — TELEPHONE (OUTPATIENT)
Dept: ENDOCRINOLOGY CLINIC | Facility: CLINIC | Age: 55
End: 2020-11-17

## 2020-11-17 NOTE — TELEPHONE ENCOUNTER
Regarding: Non-Urgent Medical Question  Contact: [phone]  ----- Message from Ladan Angel RN sent at 11/17/2020  9:07 AM CST -----       ----- Message from Gail Salazar to Finn Apple MD sent at 11/16/2020  7:40 PM -----   My first thyroid

## 2020-11-20 ENCOUNTER — TELEPHONE (OUTPATIENT)
Dept: ENDOCRINOLOGY CLINIC | Facility: CLINIC | Age: 55
End: 2020-11-20

## 2020-11-20 ENCOUNTER — NURSE ONLY (OUTPATIENT)
Dept: ENDOCRINOLOGY CLINIC | Facility: CLINIC | Age: 55
End: 2020-11-20
Payer: COMMERCIAL

## 2020-11-20 DIAGNOSIS — E10.65 UNCONTROLLED TYPE 1 DIABETES MELLITUS WITH HYPERGLYCEMIA (HCC): Primary | ICD-10-CM

## 2020-11-20 PROCEDURE — 99211 OFF/OP EST MAY X REQ PHY/QHP: CPT | Performed by: INTERNAL MEDICINE

## 2020-11-20 PROCEDURE — 99072 ADDL SUPL MATRL&STAF TM PHE: CPT | Performed by: INTERNAL MEDICINE

## 2020-11-20 RX ORDER — BLOOD-GLUCOSE TRANSMITTER
1 EACH MISCELLANEOUS
Qty: 1 EACH | Refills: 1 | Status: SHIPPED | OUTPATIENT
Start: 2020-11-20

## 2020-11-20 RX ORDER — BLOOD-GLUCOSE,RECEIVER,CONT
1 EACH MISCELLANEOUS ONCE
Qty: 1 DEVICE | Refills: 0 | Status: SHIPPED | OUTPATIENT
Start: 2020-11-20 | End: 2020-11-20

## 2020-11-20 RX ORDER — LEVOTHYROXINE SODIUM 175 UG/1
175 TABLET ORAL DAILY
Qty: 90 TABLET | Refills: 1 | Status: SHIPPED | OUTPATIENT
Start: 2020-11-20 | End: 2020-12-30

## 2020-11-20 RX ORDER — INSULIN ASPART 100 [IU]/ML
INJECTION, SOLUTION INTRAVENOUS; SUBCUTANEOUS
Qty: 120 ML | Refills: 1 | Status: SHIPPED | OUTPATIENT
Start: 2020-11-20 | End: 2020-12-30

## 2020-11-20 RX ORDER — BLOOD-GLUCOSE SENSOR
1 EACH MISCELLANEOUS
Qty: 3 EACH | Refills: 1 | Status: SHIPPED | OUTPATIENT
Start: 2020-11-20 | End: 2022-01-12

## 2020-11-20 NOTE — PROGRESS NOTES
Trey Lee  : 1965 was seen for Insulin Pump Follow up: Medtronic 630G with Guardian sensor    Date: 2020     Start time: 8:00am  End time: 8:32 am    Insertion site assessed: Yes, lower abdomen L side. No erythema, swelling. Site intact. Discussed that with [employer]andem system it will have suspend on low with Dexcom and also has some auto bolus features in control IQ as well --> Provided contact information for [employer] if he decides to pursue this pump.     He is interested in seeing if Dexcom is

## 2020-12-13 ENCOUNTER — HOSPITAL ENCOUNTER (EMERGENCY)
Age: 55
Discharge: HOME | End: 2020-12-13
Attending: EMERGENCY MEDICINE

## 2020-12-13 ENCOUNTER — APPOINTMENT (OUTPATIENT)
Dept: CT IMAGING | Age: 55
End: 2020-12-13

## 2020-12-13 ENCOUNTER — APPOINTMENT (OUTPATIENT)
Dept: GENERAL RADIOLOGY | Age: 55
End: 2020-12-13

## 2020-12-13 VITALS
HEART RATE: 62 BPM | OXYGEN SATURATION: 94 % | SYSTOLIC BLOOD PRESSURE: 154 MMHG | RESPIRATION RATE: 17 BRPM | DIASTOLIC BLOOD PRESSURE: 85 MMHG | WEIGHT: 134 LBS | TEMPERATURE: 98.1 F

## 2020-12-13 DIAGNOSIS — M79.18 MUSCULOSKELETAL PAIN: ICD-10-CM

## 2020-12-13 DIAGNOSIS — S06.0X0A CONCUSSION WITHOUT LOSS OF CONSCIOUSNESS, INITIAL ENCOUNTER: Primary | ICD-10-CM
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
## 2020-12-13 LAB
ANION GAP SERPL CALC-SCNC: 11 MMOL/L (ref 10–20)
BASOPHILS # BLD: 0.1 K/MCL (ref 0–0.3)
BASOPHILS NFR BLD: 2 %
BUN SERPL-MCNC: 20 MG/DL (ref 6–20)
BUN/CREAT SERPL: 24 (ref 7–25)
CALCIUM SERPL-MCNC: 9.5 MG/DL (ref 8.4–10.2)
CHLORIDE SERPL-SCNC: 108 MMOL/L (ref 98–107)
CO2 SERPL-SCNC: 25 MMOL/L (ref 21–32)
CREAT SERPL-MCNC: 0.82 MG/DL (ref 0.67–1.17)
DEPRECATED RDW RBC: 37.7 FL (ref 39–50)
EOSINOPHIL # BLD: 0.3 K/MCL (ref 0–0.5)
EOSINOPHIL NFR BLD: 4 %
ERYTHROCYTE [DISTWIDTH] IN BLOOD: 12.6 % (ref 11–15)
FASTING DURATION TIME PATIENT: ABNORMAL H
GFR SERPLBLD BASED ON 1.73 SQ M-ARVRAT: >90 ML/MIN/1.73M2
GLUCOSE SERPL-MCNC: 118 MG/DL (ref 65–99)
HCT VFR BLD CALC: 46.6 % (ref 39–51)
HGB BLD-MCNC: 16 G/DL (ref 13–17)
IMM GRANULOCYTES # BLD AUTO: 0 K/MCL (ref 0–0.2)
IMM GRANULOCYTES # BLD: 0 %
LYMPHOCYTES # BLD: 1.1 K/MCL (ref 1–4)
LYMPHOCYTES NFR BLD: 19 %
MCH RBC QN AUTO: 28.5 PG (ref 26–34)
MCHC RBC AUTO-ENTMCNC: 34.3 G/DL (ref 32–36.5)
MCV RBC AUTO: 83.1 FL (ref 78–100)
MONOCYTES # BLD: 0.6 K/MCL (ref 0.3–0.9)
MONOCYTES NFR BLD: 10 %
NEUTROPHILS # BLD: 3.8 K/MCL (ref 1.8–7.7)
NEUTROPHILS NFR BLD: 65 %
NRBC BLD MANUAL-RTO: 0 /100 WBC
PLATELET # BLD AUTO: 226 K/MCL (ref 140–450)
POTASSIUM SERPL-SCNC: 4.1 MMOL/L (ref 3.4–5.1)
RBC # BLD: 5.61 MIL/MCL (ref 4.5–5.9)
SODIUM SERPL-SCNC: 140 MMOL/L (ref 135–145)
TROPONIN I SERPL HS-MCNC: <0.02 NG/ML
WBC # BLD: 5.9 K/MCL (ref 4.2–11)

## 2020-12-13 PROCEDURE — 99285 EMERGENCY DEPT VISIT HI MDM: CPT

## 2020-12-13 PROCEDURE — 36415 COLL VENOUS BLD VENIPUNCTURE: CPT

## 2020-12-13 PROCEDURE — 10004651 HB RX, NO CHARGE ITEM: Performed by: EMERGENCY MEDICINE

## 2020-12-13 PROCEDURE — 72131 CT LUMBAR SPINE W/O DYE: CPT

## 2020-12-13 PROCEDURE — 85025 COMPLETE CBC W/AUTO DIFF WBC: CPT | Performed by: NURSE PRACTITIONER

## 2020-12-13 PROCEDURE — 93010 ELECTROCARDIOGRAM REPORT: CPT | Performed by: INTERNAL MEDICINE

## 2020-12-13 PROCEDURE — 99285 EMERGENCY DEPT VISIT HI MDM: CPT | Performed by: EMERGENCY MEDICINE

## 2020-12-13 PROCEDURE — 71046 X-RAY EXAM CHEST 2 VIEWS: CPT

## 2020-12-13 PROCEDURE — 93005 ELECTROCARDIOGRAM TRACING: CPT | Performed by: NURSE PRACTITIONER

## 2020-12-13 PROCEDURE — 10002803 HB RX 637: Performed by: EMERGENCY MEDICINE

## 2020-12-13 PROCEDURE — 80048 BASIC METABOLIC PNL TOTAL CA: CPT | Performed by: NURSE PRACTITIONER

## 2020-12-13 PROCEDURE — 84484 ASSAY OF TROPONIN QUANT: CPT | Performed by: NURSE PRACTITIONER

## 2020-12-13 PROCEDURE — 70450 CT HEAD/BRAIN W/O DYE: CPT

## 2020-12-13 RX ORDER — ACETAMINOPHEN 325 MG/1
650 TABLET ORAL ONCE
Status: COMPLETED | OUTPATIENT
Start: 2020-12-13 | End: 2020-12-13

## 2020-12-13 RX ORDER — MECLIZINE HYDROCHLORIDE 25 MG/1
25 TABLET ORAL ONCE
Status: COMPLETED | OUTPATIENT
Start: 2020-12-13 | End: 2020-12-13

## 2020-12-13 RX ORDER — MECLIZINE HYDROCHLORIDE 25 MG/1
25 TABLET ORAL 3 TIMES DAILY PRN
Qty: 15 TABLET | Refills: 0 | Status: SHIPPED | OUTPATIENT
Start: 2020-12-13

## 2020-12-13 RX ORDER — LEVOTHYROXINE SODIUM 175 UG/1
175 TABLET ORAL DAILY
COMMUNITY

## 2020-12-13 RX ADMIN — ACETAMINOPHEN 650 MG: 325 TABLET ORAL at 13:36

## 2020-12-13 RX ADMIN — MECLIZINE HYDROCHLORIDE 25 MG: 25 TABLET ORAL at 13:36

## 2020-12-13 ASSESSMENT — ENCOUNTER SYMPTOMS
DIZZINESS: 1
DIARRHEA: 0
COUGH: 0
BACK PAIN: 1
HEADACHES: 1
DIAPHORESIS: 0
EYE REDNESS: 0
NUMBNESS: 0
VOMITING: 0
CONSTIPATION: 0
SHORTNESS OF BREATH: 1
POLYDIPSIA: 0
ABDOMINAL PAIN: 0
EYE PAIN: 0
NAUSEA: 1
WEAKNESS: 0
FATIGUE: 0
CHILLS: 0
LIGHT-HEADEDNESS: 1
RHINORRHEA: 0
TREMORS: 0
SPEECH DIFFICULTY: 0
BRUISES/BLEEDS EASILY: 0
SORE THROAT: 0
FEVER: 0

## 2020-12-13 ASSESSMENT — PAIN SCALES - GENERAL: PAINLEVEL_OUTOF10: 8

## 2020-12-13 ASSESSMENT — PAIN DESCRIPTION - PAIN TYPE: TYPE: ACUTE PAIN

## 2020-12-14 LAB
ATRIAL RATE (BPM): 63
P AXIS (DEGREES): 27
PR-INTERVAL (MSEC): 152
QRS-INTERVAL (MSEC): 90
QT-INTERVAL (MSEC): 396
QTC: 405
R AXIS (DEGREES): 17
REPORT TEXT: NORMAL
T AXIS (DEGREES): 14
VENTRICULAR RATE EKG/MIN (BPM): 63

## 2020-12-18 ENCOUNTER — OFFICE VISIT (OUTPATIENT)
Dept: FAMILY MEDICINE CLINIC | Facility: CLINIC | Age: 55
End: 2020-12-18
Payer: COMMERCIAL

## 2020-12-18 VITALS
DIASTOLIC BLOOD PRESSURE: 74 MMHG | WEIGHT: 236 LBS | SYSTOLIC BLOOD PRESSURE: 138 MMHG | TEMPERATURE: 97 F | HEART RATE: 76 BPM | BODY MASS INDEX: 33.04 KG/M2 | HEIGHT: 71 IN | RESPIRATION RATE: 16 BRPM | OXYGEN SATURATION: 97 %

## 2020-12-18 DIAGNOSIS — R42 DIZZY: ICD-10-CM

## 2020-12-18 DIAGNOSIS — M79.605 LEFT LEG PAIN: ICD-10-CM

## 2020-12-18 DIAGNOSIS — M54.50 ACUTE BILATERAL LOW BACK PAIN WITHOUT SCIATICA: ICD-10-CM
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
## 2020-12-18 DIAGNOSIS — M54.12 CERVICAL RADICULOPATHY: ICD-10-CM

## 2020-12-18 DIAGNOSIS — S06.0X0A CONCUSSION WITHOUT LOSS OF CONSCIOUSNESS, INITIAL ENCOUNTER: ICD-10-CM

## 2020-12-18 PROCEDURE — 3008F BODY MASS INDEX DOCD: CPT | Performed by: FAMILY MEDICINE

## 2020-12-18 PROCEDURE — 99214 OFFICE O/P EST MOD 30 MIN: CPT | Performed by: FAMILY MEDICINE

## 2020-12-18 PROCEDURE — 3078F DIAST BP <80 MM HG: CPT | Performed by: FAMILY MEDICINE

## 2020-12-18 PROCEDURE — 3075F SYST BP GE 130 - 139MM HG: CPT | Performed by: FAMILY MEDICINE

## 2020-12-18 RX ORDER — MECLIZINE HYDROCHLORIDE 25 MG/1
25 TABLET ORAL 3 TIMES DAILY PRN
Qty: 30 TABLET | Refills: 0 | Status: SHIPPED | OUTPATIENT
Start: 2020-12-18 | End: 2022-01-12

## 2020-12-18 RX ORDER — TRAMADOL HYDROCHLORIDE 50 MG/1
TABLET ORAL EVERY 6 HOURS PRN
Qty: 40 TABLET | Refills: 1 | Status: SHIPPED | OUTPATIENT
Start: 2020-12-18 | End: 2021-11-18

## 2020-12-18 RX ORDER — METAXALONE 800 MG/1
800 TABLET ORAL 3 TIMES DAILY PRN
Qty: 30 TABLET | Refills: 0 | Status: SHIPPED | OUTPATIENT
Start: 2020-12-18 | End: 2022-01-12
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
## 2020-12-29 ENCOUNTER — TELEPHONE (OUTPATIENT)
Dept: FAMILY MEDICINE CLINIC | Facility: CLINIC | Age: 55
End: 2020-12-29

## 2020-12-29 ENCOUNTER — PATIENT MESSAGE (OUTPATIENT)
Dept: FAMILY MEDICINE CLINIC | Facility: CLINIC | Age: 55
End: 2020-12-29

## 2020-12-29 DIAGNOSIS — M54.12 CERVICAL RADICULOPATHY: Primary | ICD-10-CM
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
## 2020-12-29 NOTE — TELEPHONE ENCOUNTER
Yoselin Chun [facility] Clinical Staff             Good Afternoon,     Denial received   CPT 09750   Reference Number: [id number]     This service(s) or item(s) is not approved because it does not meet the criteria for medical   necessity.  Based on the inf

## 2020-12-29 NOTE — TELEPHONE ENCOUNTER
From: Sabrina Castro  To: Korey Magallon DO  Sent: 12/29/2020 9:14 AM CST  Subject: Other    My insurance company denied the mri.  Can you please check into this so I can get one done   Thank you

## 2020-12-30 RX ORDER — INSULIN ASPART 100 [IU]/ML
INJECTION, SOLUTION INTRAVENOUS; SUBCUTANEOUS
Qty: 120 ML | Refills: 0 | Status: SHIPPED | OUTPATIENT
Start: 2020-12-30 | End: 2021-02-18

## 2020-12-30 RX ORDER — LEVOTHYROXINE SODIUM 175 UG/1
175 TABLET ORAL DAILY
Qty: 90 TABLET | Refills: 0 | Status: SHIPPED | OUTPATIENT
Start: 2020-12-30 | End: 2021-07-10

## 2021-01-02 ENCOUNTER — HOSPITAL ENCOUNTER (OUTPATIENT)
Dept: GENERAL RADIOLOGY | Age: 56
Discharge: HOME | End: 2021-01-02
Attending: FAMILY MEDICINE
Payer: COMMERCIAL
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
## 2021-01-02 DIAGNOSIS — M54.12 CERVICAL RADICULOPATHY: ICD-10-CM

## 2021-01-02 PROCEDURE — 72040 X-RAY EXAM NECK SPINE 2-3 VW: CPT | Performed by: FAMILY MEDICINE

## 2021-02-18 ENCOUNTER — TELEPHONE (OUTPATIENT)
Dept: ENDOCRINOLOGY CLINIC | Facility: CLINIC | Age: 56
End: 2021-02-18

## 2021-02-18 NOTE — TELEPHONE ENCOUNTER
LOV 11/16/20. RTC 3 months. No f/u. Called to schedule first available. Booked 4/3/21. Pended 3 month supply.

## 2021-02-18 NOTE — TELEPHONE ENCOUNTER
Pharmacy refill request - Need for Clarification;    •  insulin aspart (NOVOLOG) 100 UNIT/ML Subcutaneous Solution, Inject via insulin pump.  Maximum 130 units daily, Disp: 120 mL, Rfl: 0    NOTE TO PRESCRIBER:  Patient has new insurance, medication is not

## 2021-02-19 RX ORDER — INSULIN ASPART 100 [IU]/ML
INJECTION, SOLUTION INTRAVENOUS; SUBCUTANEOUS
Qty: 120 ML | Refills: 0 | Status: SHIPPED | OUTPATIENT
Start: 2021-02-19 | End: 2021-07-10

## 2021-02-26 RX ORDER — INSULIN LISPRO 100 [IU]/ML
INJECTION, SOLUTION INTRAVENOUS; SUBCUTANEOUS
Qty: 120 ML | Refills: 0 | Status: SHIPPED | OUTPATIENT
Start: 2021-02-26 | End: 2021-03-04

## 2021-03-04 RX ORDER — INSULIN LISPRO 100 [IU]/ML
INJECTION, SOLUTION INTRAVENOUS; SUBCUTANEOUS
Qty: 120 ML | Refills: 0 | Status: SHIPPED | OUTPATIENT
Start: 2021-03-04 | End: 2021-04-13

## 2021-03-04 NOTE — TELEPHONE ENCOUNTER
Pharmacy refill request - Need for clarification regarding;    •  Insulin Lispro (HUMALOG) 100 UNIT/ML Subcutaneous Solution, Inject via insulin pump.  Maximum 130 units daily, Disp: 120 mL, Rfl: 0    MESSAGE:  Please substitute with Novolog, Humalog is on

## 2021-03-24 ENCOUNTER — TELEPHONE (OUTPATIENT)
Dept: ENDOCRINOLOGY CLINIC | Facility: CLINIC | Age: 56
End: 2021-03-24

## 2021-03-25 NOTE — TELEPHONE ENCOUNTER
Placed on Dr. Dorothy Lloyd desk for review and signature. Chart note printed and will await for the signed form before faxing.

## 2021-03-30 NOTE — TELEPHONE ENCOUNTER
Jennifer Nolasco from [facility] is calling to get the status on office visit note and CMA. Please advise they are hoping to get with in 2-3 business days.

## 2021-03-31 NOTE — TELEPHONE ENCOUNTER
Signed form and LOV dtd 11/16/20 note faxed to [facility] [phone]    Sent for scanning

## 2021-04-03 ENCOUNTER — OFFICE VISIT (OUTPATIENT)
Dept: ENDOCRINOLOGY CLINIC | Facility: CLINIC | Age: 56
End: 2021-04-03
Payer: COMMERCIAL

## 2021-04-03 ENCOUNTER — TELEPHONE (OUTPATIENT)
Dept: ENDOCRINOLOGY CLINIC | Facility: CLINIC | Age: 56
End: 2021-04-03

## 2021-04-03 VITALS
WEIGHT: 240 LBS | SYSTOLIC BLOOD PRESSURE: 151 MMHG | HEIGHT: 71 IN | BODY MASS INDEX: 33.6 KG/M2 | HEART RATE: 69 BPM | DIASTOLIC BLOOD PRESSURE: 85 MMHG

## 2021-04-03 DIAGNOSIS — E89.0 POSTABLATIVE HYPOTHYROIDISM: ICD-10-CM

## 2021-04-03 DIAGNOSIS — E10.65 UNCONTROLLED TYPE 1 DIABETES MELLITUS WITH HYPERGLYCEMIA (HCC): Primary | ICD-10-CM

## 2021-04-03 DIAGNOSIS — E55.9 VITAMIN D DEFICIENCY: ICD-10-CM

## 2021-04-03 PROCEDURE — 82947 ASSAY GLUCOSE BLOOD QUANT: CPT | Performed by: INTERNAL MEDICINE

## 2021-04-03 PROCEDURE — 3077F SYST BP >= 140 MM HG: CPT | Performed by: INTERNAL MEDICINE

## 2021-04-03 PROCEDURE — 3079F DIAST BP 80-89 MM HG: CPT | Performed by: INTERNAL MEDICINE

## 2021-04-03 PROCEDURE — 83036 HEMOGLOBIN GLYCOSYLATED A1C: CPT | Performed by: INTERNAL MEDICINE

## 2021-04-03 PROCEDURE — 36416 COLLJ CAPILLARY BLOOD SPEC: CPT | Performed by: INTERNAL MEDICINE

## 2021-04-03 PROCEDURE — 3008F BODY MASS INDEX DOCD: CPT | Performed by: INTERNAL MEDICINE

## 2021-04-03 PROCEDURE — 99214 OFFICE O/P EST MOD 30 MIN: CPT | Performed by: INTERNAL MEDICINE

## 2021-04-03 NOTE — TELEPHONE ENCOUNTER
Hi!  Can we call the [employer] rep and ask them to find out if there are any issues with this patient's application for this patient's pump?  Also, can we advise that this patient may be having an allergic reaction to his pump tubing or other supplies, and etelvina

## 2021-04-03 NOTE — PROGRESS NOTES
Name: Leydi Villegas  Date: 4/03/21    Referring Physician: No ref.  provider found    INITIAL VISIT  Leydi Villegas is a 48year old male who had presented for evaluation and management of Type 1.5 diabetes that was diagnosed when he was in his 25s and hyp today elevated         Hyperlipidemia present: yes         Peripheral Vascular Disease present: yes    : Nephropathy present: no    Neuro: Neuropathy present: no    Skin: Infection or ulceration: no, but feels that insulin pump sites are a bit hardened, 30 tablet, Rfl: 0  •  ergocalciferol 1.25 MG (82435 UT) Oral Cap, Take 1 capsule (50,000 Units total) by mouth once a week.  (Patient not taking: Reported on 4/3/2021 ), Disp: 12 capsule, Rfl: 0     Allergies:     Statins                 MYALGIA  Bee Venom time, self, and place  Nutritional:  no abnormal weight gain or loss    Lab Data:   Lab Results   Component Value Date     (H) 12/07/2019    A1C 8.5 (A) 04/03/2021     Lab Results   Component Value Date     (H) 07/27/2020    BUN 20 07/27/2020 reactions- will recommend patient to try and change tubing, to see if this helps. 2.) Management of Diabetic Complications- We discussed the complications of diabetes include retinopathy, neuropathy, nephropathy and cardiovascular disease.    - up to d

## 2021-04-13 ENCOUNTER — PATIENT MESSAGE (OUTPATIENT)
Dept: ENDOCRINOLOGY CLINIC | Facility: CLINIC | Age: 56
End: 2021-04-13

## 2021-04-13 RX ORDER — INSULIN LISPRO 100 [IU]/ML
INJECTION, SOLUTION INTRAVENOUS; SUBCUTANEOUS
Qty: 120 ML | Refills: 0 | Status: SHIPPED | OUTPATIENT
Start: 2021-04-13 | End: 2021-07-20

## 2021-04-13 RX ORDER — BLOOD SUGAR DIAGNOSTIC
STRIP MISCELLANEOUS
Qty: 200 EACH | Refills: 2 | Status: SHIPPED | OUTPATIENT
Start: 2021-04-13 | End: 2021-07-10

## 2021-04-13 NOTE — TELEPHONE ENCOUNTER
Pt returned call. He states that he needs Contour Next test strips. Please call if further questions.

## 2021-04-13 NOTE — TELEPHONE ENCOUNTER
From: Jemima Pate  To: Melissa Saleh MD  Sent: 4/13/2021 3:46 PM CDT  Subject: Prescription Question    As was at [facility] and was told there was no prescription for my test strips can someone please call and get it going I'm all out  And while yo

## 2021-04-16 ENCOUNTER — PATIENT MESSAGE (OUTPATIENT)
Dept: ENDOCRINOLOGY CLINIC | Facility: CLINIC | Age: 56
End: 2021-04-16

## 2021-04-19 ENCOUNTER — PATIENT MESSAGE (OUTPATIENT)
Dept: ENDOCRINOLOGY CLINIC | Facility: CLINIC | Age: 56
End: 2021-04-19

## 2021-04-19 NOTE — TELEPHONE ENCOUNTER
From: Jemima Pate  To: Melissa Saleh MD  Sent: 4/16/2021 12:54 PM CDT  Subject: Referral Request    I just this message on my voicemail   Can someone please call them . .. how could a pump not be a medical necessity       Jerzy Alvarez it's Sosa ca

## 2021-04-19 NOTE — TELEPHONE ENCOUNTER
Hi Dr. Leonard Can    Please see [email or web address] message. Pt left you a number to call for peer to peer appeal [phone]. I did however, contact Sosa at [facility] and left a message to see if she can fax over medical necessity form for you to sign.

## 2021-04-20 NOTE — TELEPHONE ENCOUNTER
Hi!  Can we please contact patient and find out from him exactly what is going on with the pump before I schedule the peer-to peer review?  He was having problems because it was not connecting to his Guardian, I know, but can we find out what else was going

## 2021-04-20 NOTE — TELEPHONE ENCOUNTER
Dr. Monie Burks,     Patient sent another [employer] message regarding denial on his pump. Per message below, a peer to peer can be done to try and overturn the denial.  [facility] and spoke to Juve Dixon who states peer to peer has be to scheduled.   She states r

## 2021-04-20 NOTE — TELEPHONE ENCOUNTER
From: Harsh Benítez  To: Melissa Clemens MD  Sent: 4/19/2021 4:33 PM CDT  Subject: Referral Request    Did anyone see the message I sent a couple of days ago about my pump problems   Thank you    Christos Ramires it's Sosa calling with [facility]

## 2021-04-21 NOTE — TELEPHONE ENCOUNTER
Hi Dr. Celena Maldonado    Pt states that the pump has been lagging and has trouble connecting--the pump readings always read high. This is in addition to not being able to connect to his Guardian.

## 2021-04-21 NOTE — TELEPHONE ENCOUNTER
Hi!  So, are we waiting for the form to be signed, or should I do the [id number]? I ma fine with either. Thank you!

## 2021-04-23 NOTE — TELEPHONE ENCOUNTER
Hi Dr. Blane Marino pt mentioned a [id number]-pe-hmap in his original [employer] message and left this number for you to call to schedule. Please advise if you would like to call or we can get the form.     [employer]    [phone]

## 2021-04-26 NOTE — TELEPHONE ENCOUNTER
Pedro Rodriguez    I was informed that there is no letter to be signed---there is either an appeal letter that has to be created by the doctor, or a [id number] interview. Please advise as to what option you would prefer so I can update the pt. Thanks!

## 2021-04-28 ENCOUNTER — TELEPHONE (OUTPATIENT)
Dept: ENDOCRINOLOGY CLINIC | Facility: CLINIC | Age: 56
End: 2021-04-28

## 2021-04-28 NOTE — TELEPHONE ENCOUNTER
Hi!  Patient had recent eye exam on 4/10/21 at [facility] which was normal and negative for DM retinopathy. Thank you. Scanning.

## 2021-04-28 NOTE — TELEPHONE ENCOUNTER
Dr. Radha Reyes wrote and pended a appeal letter to [facility] in regard to this patient. Please review.

## 2021-04-28 NOTE — TELEPHONE ENCOUNTER
Spoke with patient about appeal letter---told patient we will notify him when we hear back from insurance company and to notify us again if his sugar levels become high or too low, patient verbalized understanding

## 2021-04-28 NOTE — TELEPHONE ENCOUNTER
Letter on behalf of Dr. Peggy Fletcher in regard to T Slim pump appeal faxed to [facility] appeals department. Fax number: [phone]    Called patient to follow up on this and high blood sugar he reported in mc message.  Left VM

## 2021-05-04 ENCOUNTER — TELEPHONE (OUTPATIENT)
Dept: ENDOCRINOLOGY CLINIC | Facility: CLINIC | Age: 56
End: 2021-05-04

## 2021-05-04 NOTE — TELEPHONE ENCOUNTER
Called [facility] to follow-up on appeal---rep claimed they never received letter from our office on 4/28.  Re-faxed letter to [phone]

## 2021-05-05 NOTE — TELEPHONE ENCOUNTER
Letter of appeal has been faxed two different times to number given to me by [facility]  and has failed.  Spoke to Dr. Sofia Ahmadi who will call and schedule peer to peer for pt in regard to tandem pump    [facility] appeals    [phone]

## 2021-05-11 NOTE — TELEPHONE ENCOUNTER
Dr. Mendez Quesada    Just following up on this---when are you available for peer to peer?  I can call and schedule

## 2021-05-15 ENCOUNTER — PATIENT MESSAGE (OUTPATIENT)
Dept: ENDOCRINOLOGY CLINIC | Facility: CLINIC | Age: 56
End: 2021-05-15

## 2021-05-15 DIAGNOSIS — E10.65 UNCONTROLLED TYPE 1 DIABETES MELLITUS WITH HYPERGLYCEMIA (HCC): Primary | ICD-10-CM

## 2021-05-18 NOTE — TELEPHONE ENCOUNTER
From: Harsh Benítez  To: Melissa Clemens MD  Sent: 5/15/2021 6:43 PM CDT  Subject: Referral Request    This is the message I received from the insurance company. The pump only works half the time now.  I've been checking my sugars up to 8 to 10 times

## 2021-05-18 NOTE — TELEPHONE ENCOUNTER
Spoke to [facility] at [phone] her the denial letter from insurance company 5/13/21. [facility] will call the patient and work on a letter stating why patient needs a new pump due to malfunction. Will contact our office if any more information is needed.     In

## 2021-05-18 NOTE — TELEPHONE ENCOUNTER
Please see [employer] message. Per [facility]'s denial letter:    Patient must submit vendor notes explaining why his current pump is malfunctioning and why he needs a replacement and whether or not pump can be repaired.  Patient currently has [facility]

## 2021-05-19 NOTE — TELEPHONE ENCOUNTER
Sugars have been running high and have been running low. Due to insulin pump malfuction. Recent sugars    5/18     5/19---AM--56.      Asked patient if he knows how to self correct when sugar runs low--patient said he does but due to the pump it i

## 2021-05-20 RX ORDER — BLOOD SUGAR DIAGNOSTIC
1 STRIP MISCELLANEOUS 4 TIMES DAILY
Qty: 400 EACH | Refills: 0 | Status: SHIPPED | OUTPATIENT
Start: 2021-05-20

## 2021-05-20 RX ORDER — INSULIN DETEMIR 100 [IU]/ML
48 INJECTION, SOLUTION SUBCUTANEOUS EVERY MORNING
Qty: 45 ML | Refills: 0 | Status: SHIPPED | OUTPATIENT
Start: 2021-05-20 | End: 2021-05-27

## 2021-05-20 RX ORDER — INSULIN LISPRO 100 [IU]/ML
8 INJECTION, SOLUTION INTRAVENOUS; SUBCUTANEOUS
Qty: 27 ML | Refills: 0 | Status: SHIPPED | OUTPATIENT
Start: 2021-05-20 | End: 2021-08-18

## 2021-05-20 NOTE — TELEPHONE ENCOUNTER
Hi!  I have prescribed insulin pens for him:  Please give him the following instructions:     I have prescribed the following:  Levemir 45 units QAM  Humalog 8 units tid with meals    Please check blood sugars 3-4 times a day, fasting, before lunch, before

## 2021-05-25 ENCOUNTER — TELEPHONE (OUTPATIENT)
Dept: ENDOCRINOLOGY CLINIC | Facility: CLINIC | Age: 56
End: 2021-05-25

## 2021-05-25 NOTE — TELEPHONE ENCOUNTER
•  insulin detemir (LEVEMIR FLEXTOUCH) 100 UNIT/ML Subcutaneous Solution Pen-injector, Inject 48 Units into the skin every morning., Disp: 45 mL, Rfl: 0    KEY CODE; [id number]

## 2021-05-26 NOTE — TELEPHONE ENCOUNTER
Dr. Dorys Mancini is not preferred. Lantus and Toujeo are. No allergies noted in the chart for both and patient used to be on lantus in 2016. Please advise if ok to switch to one of the preferred or proceed with levemir PA. Thank you.

## 2021-05-27 RX ORDER — INSULIN GLARGINE 100 [IU]/ML
48 INJECTION, SOLUTION SUBCUTANEOUS NIGHTLY
Qty: 45 ML | Refills: 0 | Status: SHIPPED | OUTPATIENT
Start: 2021-05-27

## 2021-05-27 NOTE — TELEPHONE ENCOUNTER
Spoke to [facility], patient will need a new office visit, with # of times testing per day and specific reasoning of malfunction of current pump noted in chart notes.  Patient will need to contact [employer] to get vendor notes as to why his pump is malfunctionin

## 2021-05-27 NOTE — TELEPHONE ENCOUNTER
Bharathi Mcallister from [facility] called in to give an update, Rn not available.  Please call [phone]

## 2021-05-27 NOTE — TELEPHONE ENCOUNTER
Spoke to Jemal Hernandez at [employer], she is going to check in with Indra Dee and look into this patient's case to try to get him a new insulin pump w/ [employer], due to Medtronic pump malfunction.  Faxed over insurance information, denial letter, and LOVs to Jemal Hernandez for [facility]

## 2021-05-27 NOTE — TELEPHONE ENCOUNTER
Dr. Lewis Body    I pended script for Lantus the preferred brand.  Will call [employer] back in regard to insulin pump

## 2021-05-27 NOTE — ADDENDUM NOTE
Addended by: Ree Camejo on: 5/27/2021 09:48 AM     Modules accepted: Orders Patient: Barb Prado    Procedure Summary     Date:  08/29/20 Room / Location:  [address] [place] [address] ENDOSCOPY 03 / [address] [place] [address] ENDOSCOPY    Anesthesia Start:  1019 Anesthesia Stop:      Procedure:  ESOPHAGOGASTRODUODENOSCOPY (EGD) (N/A ) Diagnosis:  (GASTRIC POLYP, GASTRITIS, HIA

## 2021-06-07 ENCOUNTER — TELEPHONE (OUTPATIENT)
Dept: ENDOCRINOLOGY CLINIC | Facility: CLINIC | Age: 56
End: 2021-06-07

## 2021-06-07 NOTE — TELEPHONE ENCOUNTER
•  insulin glargine (LANTUS SOLOSTAR) 100 UNIT/ML Subcutaneous Solution Pen-injector, Inject 48 Units into the skin nightly., Disp: 45 mL, Rfl: 0    Pharmacy comments: pt on pump cant use pens please change

## 2021-06-09 NOTE — TELEPHONE ENCOUNTER
Spoke to pharmacy to advise that lantus pens were prescribed as back up in case pump malfunctions - pharmacist stated understanding and will fill RX for patient

## 2021-06-16 ENCOUNTER — OFFICE VISIT (OUTPATIENT)
Dept: ENDOCRINOLOGY CLINIC | Facility: CLINIC | Age: 56
End: 2021-06-16
Payer: COMMERCIAL

## 2021-06-16 ENCOUNTER — TELEPHONE (OUTPATIENT)
Dept: ENDOCRINOLOGY CLINIC | Facility: CLINIC | Age: 56
End: 2021-06-16

## 2021-06-16 VITALS
RESPIRATION RATE: 16 BRPM | HEART RATE: 67 BPM | SYSTOLIC BLOOD PRESSURE: 140 MMHG | BODY MASS INDEX: 34.3 KG/M2 | HEIGHT: 71 IN | WEIGHT: 245 LBS | DIASTOLIC BLOOD PRESSURE: 71 MMHG

## 2021-06-16 DIAGNOSIS — E10.65 UNCONTROLLED TYPE 1 DIABETES MELLITUS WITH HYPERGLYCEMIA (HCC): Primary | ICD-10-CM

## 2021-06-16 DIAGNOSIS — E55.9 VITAMIN D DEFICIENCY: ICD-10-CM

## 2021-06-16 DIAGNOSIS — E03.9 HYPOTHYROIDISM, UNSPECIFIED TYPE: ICD-10-CM

## 2021-06-16 PROCEDURE — 3052F HG A1C>EQUAL 8.0%<EQUAL 9.0%: CPT | Performed by: INTERNAL MEDICINE

## 2021-06-16 PROCEDURE — 3008F BODY MASS INDEX DOCD: CPT | Performed by: INTERNAL MEDICINE

## 2021-06-16 PROCEDURE — 3077F SYST BP >= 140 MM HG: CPT | Performed by: INTERNAL MEDICINE

## 2021-06-16 PROCEDURE — 3078F DIAST BP <80 MM HG: CPT | Performed by: INTERNAL MEDICINE

## 2021-06-16 PROCEDURE — 99214 OFFICE O/P EST MOD 30 MIN: CPT | Performed by: INTERNAL MEDICINE

## 2021-06-16 PROCEDURE — 83036 HEMOGLOBIN GLYCOSYLATED A1C: CPT | Performed by: INTERNAL MEDICINE

## 2021-06-16 PROCEDURE — 36416 COLLJ CAPILLARY BLOOD SPEC: CPT | Performed by: INTERNAL MEDICINE

## 2021-06-16 PROCEDURE — 82947 ASSAY GLUCOSE BLOOD QUANT: CPT | Performed by: INTERNAL MEDICINE

## 2021-06-16 NOTE — PROGRESS NOTES
Name: Jemima Pate  Date: 6/16/21    Referring Physician: No ref.  provider found    INITIAL VISIT  Jemima Pate is a 48year old male who had presented for evaluation and management of Type 1.5 diabetes that was diagnosed when he was in his 25s and hyp April 2021    CV: Cardiovascular disease present: no         Hypertension present: no, but today elevated         Hyperlipidemia present: yes         Peripheral Vascular Disease present: yes    : Nephropathy present: no    Neuro: Neuropathy present: no Tab, Take 1 tablet (800 mg total) by mouth 3 (three) times daily as needed for Pain., Disp: 30 tablet, Rfl: 0  •  Continuous Blood Gluc Sensor (DEXCOM G6 SENSOR) Does not apply Misc, 1 each by Does not apply route Every 10 days. , Disp: 3 each, Rfl: 1  •  C distress  Eyes:  normal conjunctivae, sclera. , normal sclera and normal pupils  Psychiatric:  oriented to time, self, and place  Nutritional:  no abnormal weight gain or loss    Lab Data:   Lab Results   Component Value Date     (H) 12/07/2019    A1 Diabetic Complications- We discussed the complications of diabetes include retinopathy, neuropathy, nephropathy and cardiovascular disease.    - up to date with flu vaccine, received Covid vaccine   - Ophtho up to date  - Check MAC, lipid panel, CMP before

## 2021-06-16 NOTE — TELEPHONE ENCOUNTER
Spoke to [facility] at [facility] in regard to update concerning Tandem pump. Patient stated that [employer] will not release any information in writing.  Dr. Fannie Bautista included in her chart note today the reasoning for pump malfunction and that patient checks blood

## 2021-06-17 NOTE — TELEPHONE ENCOUNTER
Faxed LOV to Kathleen Mcdonald [phone] as well as completed paperwork for Dexcom G6 for [facility] medical, placed paperwork on provider's desk for review and signature.

## 2021-06-21 NOTE — TELEPHONE ENCOUNTER
Faxed signed ppw to [facility] medical for Dexcom supplies. Updated chart notes were faxed to [facility] on 6/17. Left patient detailed message updating him on status and notifying him to contact [facility] to create an account.

## 2021-06-22 NOTE — TELEPHONE ENCOUNTER
Received fax from [employer] requesting we fax back CMN form for pump replacement. Per previous notes, patient will be switching to Tandem.

## 2021-06-22 NOTE — TELEPHONE ENCOUNTER
Called and spoke to patient about update with tandem and [facility]. Advised patient to call [facility] to set up an account. Patient verbalized understanding.

## 2021-06-29 ENCOUNTER — TELEPHONE (OUTPATIENT)
Dept: ENDOCRINOLOGY CLINIC | Facility: CLINIC | Age: 56
End: 2021-06-29

## 2021-06-29 NOTE — TELEPHONE ENCOUNTER
Received cmn from [employer] for pump replacement.   Will send message to patient asking if he will be using [employer]tr[employer] or switching to tandem

## 2021-07-10 ENCOUNTER — LAB ENCOUNTER (OUTPATIENT)
Dept: LAB | Facility: HOSPITAL | Age: 56
End: 2021-07-10
Attending: INTERNAL MEDICINE
Payer: COMMERCIAL

## 2021-07-10 ENCOUNTER — OFFICE VISIT (OUTPATIENT)
Dept: ENDOCRINOLOGY CLINIC | Facility: CLINIC | Age: 56
End: 2021-07-10
Payer: COMMERCIAL

## 2021-07-10 VITALS
BODY MASS INDEX: 34.3 KG/M2 | WEIGHT: 245 LBS | SYSTOLIC BLOOD PRESSURE: 171 MMHG | HEIGHT: 71 IN | DIASTOLIC BLOOD PRESSURE: 81 MMHG | HEART RATE: 64 BPM

## 2021-07-10 DIAGNOSIS — E55.9 VITAMIN D DEFICIENCY: ICD-10-CM

## 2021-07-10 DIAGNOSIS — E03.9 HYPOTHYROIDISM, UNSPECIFIED TYPE: ICD-10-CM

## 2021-07-10 DIAGNOSIS — E89.0 POSTABLATIVE HYPOTHYROIDISM: ICD-10-CM

## 2021-07-10 DIAGNOSIS — E10.65 UNCONTROLLED TYPE 1 DIABETES MELLITUS WITH HYPERGLYCEMIA (HCC): Primary | ICD-10-CM

## 2021-07-10 LAB
ALBUMIN SERPL-MCNC: 3.5 G/DL (ref 3.4–5)
ALBUMIN/GLOB SERPL: 1.1 {RATIO} (ref 1–2)
ALP LIVER SERPL-CCNC: 155 U/L
ALT SERPL-CCNC: 26 U/L
ANION GAP SERPL CALC-SCNC: 5 MMOL/L (ref 0–18)
AST SERPL-CCNC: 16 U/L (ref 15–37)
BILIRUB SERPL-MCNC: 0.6 MG/DL (ref 0.1–2)
BUN BLD-MCNC: 21 MG/DL (ref 7–18)
BUN/CREAT SERPL: 21.9 (ref 10–20)
CALCIUM BLD-MCNC: 10.3 MG/DL (ref 8.5–10.1)
CHLORIDE SERPL-SCNC: 107 MMOL/L (ref 98–112)
CHOLEST SMN-MCNC: 240 MG/DL (ref ?–200)
CO2 SERPL-SCNC: 26 MMOL/L (ref 21–32)
CREAT BLD-MCNC: 0.96 MG/DL
CREAT UR-SCNC: 125 MG/DL
GLOBULIN PLAS-MCNC: 3.1 G/DL (ref 2.8–4.4)
GLUCOSE BLD-MCNC: 251 MG/DL (ref 70–99)
GLUCOSE BLOOD: 247
HDLC SERPL-MCNC: 55 MG/DL (ref 40–59)
LDLC SERPL CALC-MCNC: 171 MG/DL (ref ?–100)
M PROTEIN MFR SERPL ELPH: 6.6 G/DL (ref 6.4–8.2)
MICROALBUMIN UR-MCNC: 1.81 MG/DL
MICROALBUMIN/CREAT 24H UR-RTO: 14.5 UG/MG (ref ?–30)
NONHDLC SERPL-MCNC: 185 MG/DL (ref ?–130)
OSMOLALITY SERPL CALC.SUM OF ELEC: 297 MOSM/KG (ref 275–295)
PATIENT FASTING Y/N/NP: YES
PATIENT FASTING Y/N/NP: YES
POTASSIUM SERPL-SCNC: 4.7 MMOL/L (ref 3.5–5.1)
SODIUM SERPL-SCNC: 138 MMOL/L (ref 136–145)
T4 FREE SERPL-MCNC: 1.3 NG/DL (ref 0.8–1.7)
TEST STRIP LOT #: NORMAL NUMERIC
TRIGL SERPL-MCNC: 80 MG/DL (ref 30–149)
TSI SER-ACNC: 0.54 MIU/ML (ref 0.36–3.74)
VLDLC SERPL CALC-MCNC: 16 MG/DL (ref 0–30)

## 2021-07-10 PROCEDURE — 82043 UR ALBUMIN QUANTITATIVE: CPT | Performed by: INTERNAL MEDICINE

## 2021-07-10 PROCEDURE — 82306 VITAMIN D 25 HYDROXY: CPT | Performed by: INTERNAL MEDICINE

## 2021-07-10 PROCEDURE — 82947 ASSAY GLUCOSE BLOOD QUANT: CPT | Performed by: INTERNAL MEDICINE

## 2021-07-10 PROCEDURE — 84439 ASSAY OF FREE THYROXINE: CPT

## 2021-07-10 PROCEDURE — 99214 OFFICE O/P EST MOD 30 MIN: CPT | Performed by: INTERNAL MEDICINE

## 2021-07-10 PROCEDURE — 36415 COLL VENOUS BLD VENIPUNCTURE: CPT | Performed by: INTERNAL MEDICINE

## 2021-07-10 PROCEDURE — 80061 LIPID PANEL: CPT | Performed by: INTERNAL MEDICINE

## 2021-07-10 PROCEDURE — 3008F BODY MASS INDEX DOCD: CPT | Performed by: INTERNAL MEDICINE

## 2021-07-10 PROCEDURE — 36416 COLLJ CAPILLARY BLOOD SPEC: CPT | Performed by: INTERNAL MEDICINE

## 2021-07-10 PROCEDURE — 84443 ASSAY THYROID STIM HORMONE: CPT

## 2021-07-10 PROCEDURE — 80053 COMPREHEN METABOLIC PANEL: CPT | Performed by: INTERNAL MEDICINE

## 2021-07-10 PROCEDURE — 3061F NEG MICROALBUMINURIA REV: CPT | Performed by: FAMILY MEDICINE

## 2021-07-10 PROCEDURE — 3079F DIAST BP 80-89 MM HG: CPT | Performed by: INTERNAL MEDICINE

## 2021-07-10 PROCEDURE — 82570 ASSAY OF URINE CREATININE: CPT | Performed by: INTERNAL MEDICINE

## 2021-07-10 PROCEDURE — 3077F SYST BP >= 140 MM HG: CPT | Performed by: INTERNAL MEDICINE

## 2021-07-10 NOTE — PROGRESS NOTES
Name: Zoë Grossman  Date: 7/10/21    Referring Physician: No ref.  provider found    INITIAL VISIT  Zoë Grossman is a 48year old male who had presented for evaluation and management of Type 1.5 diabetes that was diagnosed when he was in his 25s and hyp months: saw in April 2021    CV: Cardiovascular disease present: no         Hypertension present: no, but today elevated         Hyperlipidemia present: yes         Peripheral Vascular Disease present: yes    : Nephropathy present: no    Neuro: Neuropath Metaxalone 800 MG Oral Tab, Take 1 tablet (800 mg total) by mouth 3 (three) times daily as needed for Pain., Disp: 30 tablet, Rfl: 0  •  Continuous Blood Gluc Sensor (DEXCOM G6 SENSOR) Does not apply Misc, 1 each by Does not apply route Every 10 days. , Dis Appearance:  alert, well developed, in no acute distress  Eyes:  normal conjunctivae, sclera. , normal sclera and normal pupils  Psychiatric:  oriented to time, self, and place  Nutritional:  no abnormal weight gain or loss    Lab Data:   Lab Results   Comp sugars 5 times a day    2.) Management of Diabetic Complications- We discussed the complications of diabetes include retinopathy, neuropathy, nephropathy and cardiovascular disease.    - up to date with flu vaccine, received Covid vaccine   - Ophtho up to d

## 2021-07-12 LAB — 25(OH)D3 SERPL-MCNC: 43 NG/ML (ref 30–100)

## 2021-07-12 NOTE — TELEPHONE ENCOUNTER
LOV 7/10/21  No specified RTC.  Per LOV note, \"He will make an appointment with GISELL Olivares, RN as soon as he gets his new pump\"    Pended 3 month supply for review

## 2021-07-16 ENCOUNTER — TELEPHONE (OUTPATIENT)
Dept: ENDOCRINOLOGY CLINIC | Facility: CLINIC | Age: 56
End: 2021-07-16

## 2021-07-16 RX ORDER — BLOOD SUGAR DIAGNOSTIC
STRIP MISCELLANEOUS
Qty: 500 EACH | Refills: 0 | Status: SHIPPED | OUTPATIENT
Start: 2021-07-16

## 2021-07-16 RX ORDER — INSULIN ASPART 100 [IU]/ML
INJECTION, SOLUTION INTRAVENOUS; SUBCUTANEOUS
Qty: 120 ML | Refills: 0 | Status: SHIPPED | OUTPATIENT
Start: 2021-07-16 | End: 2021-07-20

## 2021-07-16 RX ORDER — LEVOTHYROXINE SODIUM 175 UG/1
175 TABLET ORAL DAILY
Qty: 90 TABLET | Refills: 0 | Status: SHIPPED | OUTPATIENT
Start: 2021-07-16 | End: 2021-10-28

## 2021-07-16 NOTE — TELEPHONE ENCOUNTER
Georgiana Mcqueen requesting CMN and chart notes in reference to Tandem insulin pump and supplies. Please fax to [phone] ref# [id number]. For additional questions please call. Thank you.

## 2021-07-17 ENCOUNTER — TELEPHONE (OUTPATIENT)
Dept: ENDOCRINOLOGY CLINIC | Facility: CLINIC | Age: 56
End: 2021-07-17

## 2021-07-17 NOTE — TELEPHONE ENCOUNTER
KEY: [id number]   insulin aspart (NOVOLOG) 100 UNIT/ML Subcutaneous Solution, Inject via insulin pump.  Maximum 130 units daily, Disp: 120 mL, Rfl: 0

## 2021-07-19 ENCOUNTER — TELEPHONE (OUTPATIENT)
Dept: ENDOCRINOLOGY CLINIC | Facility: CLINIC | Age: 56
End: 2021-07-19

## 2021-07-19 DIAGNOSIS — E83.52 HYPERCALCEMIA: ICD-10-CM

## 2021-07-19 DIAGNOSIS — E03.9 HYPOTHYROIDISM, UNSPECIFIED TYPE: ICD-10-CM

## 2021-07-19 DIAGNOSIS — E10.65 UNCONTROLLED TYPE 1 DIABETES MELLITUS WITH HYPERGLYCEMIA (HCC): Primary | ICD-10-CM

## 2021-07-19 NOTE — TELEPHONE ENCOUNTER
Hi!  Please call this patient and let him know that I have reviewed all of his tests and that his LDL is elevated. It would be a good idea if he could cut down on his fatty foods.  In addition, his calcium level is elevated, and I would like him to hydrate

## 2021-07-20 RX ORDER — INSULIN LISPRO 100 [IU]/ML
INJECTION, SOLUTION INTRAVENOUS; SUBCUTANEOUS
Qty: 120 ML | Refills: 0 | Status: SHIPPED | OUTPATIENT
Start: 2021-07-20 | End: 2021-11-11

## 2021-07-20 NOTE — TELEPHONE ENCOUNTER
rn called patient with message below by dr Niharika Corona. Patient verbalized understanding. And labs ordered for repeat in 2 weeks.

## 2021-07-20 NOTE — TELEPHONE ENCOUNTER
Form signed by provider and faxed over to [facility] along with office notes (via epic) . Saved in from folder.

## 2021-07-20 NOTE — TELEPHONE ENCOUNTER
rn called [facility] , novolog is not preferrred by insurance, Humalog is preferred and patient already using humalog via pen.  humalog vials sent per protocol

## 2021-07-21 NOTE — TELEPHONE ENCOUNTER
Received notice from [employer] that order is ready. Patient will receive equipment and supplies for T-Slim in 3-5 days. [facility] sent notifying patient.

## 2021-07-25 ENCOUNTER — PATIENT MESSAGE (OUTPATIENT)
Dept: ENDOCRINOLOGY CLINIC | Facility: CLINIC | Age: 56
End: 2021-07-25

## 2021-07-26 NOTE — TELEPHONE ENCOUNTER
From: Harsh Benítez  To: Melissa Clemens MD  Sent: 7/25/2021 8:17 AM CDT  Subject: Other    I got the new pump and need to set training for it is there a number or something     Thxxxxx julian vergara

## 2021-08-09 ENCOUNTER — TELEPHONE (OUTPATIENT)
Dept: ENDOCRINOLOGY | Facility: HOSPITAL | Age: 56
End: 2021-08-09

## 2021-08-09 DIAGNOSIS — E10.65 UNCONTROLLED TYPE 1 DIABETES MELLITUS WITH HYPERGLYCEMIA (HCC): Primary | ICD-10-CM

## 2021-08-09 NOTE — TELEPHONE ENCOUNTER
Dr. Zurdo Clemens,    [facility] Tandem insulin pump training. If you agree with this, please sign the pended order in Epic and we will be able to schedule him.        Please call with questions  Yesi Morgan

## 2021-08-18 ENCOUNTER — HOSPITAL ENCOUNTER (OUTPATIENT)
Dept: ENDOCRINOLOGY | Facility: HOSPITAL | Age: 56
Discharge: HOME | End: 2021-08-18
Attending: INTERNAL MEDICINE
Payer: COMMERCIAL

## 2021-08-18 VITALS — BODY MASS INDEX: 33 KG/M2 | WEIGHT: 235.5 LBS

## 2021-08-18 DIAGNOSIS — E10.65 UNCONTROLLED TYPE 1 DIABETES MELLITUS WITH HYPERGLYCEMIA (HCC): Primary | ICD-10-CM

## 2021-08-18 NOTE — PROGRESS NOTES
Missy Mixon  : 1965 was seen for Initial Individual Pump Start Upgrade Education:Tandem x 2  Date: 2021  Referring Provider: Quang Leigh    Start time: 4:55 pm End time: 6:30 pm      Assessment:   Patient arrived at appointment [employer]ist[employer] resources for reinforcement. Follow up appointment set for 9/15/2021 Zoe Bryan RN. Patient verbalized understanding and has no further questions at this time.        Alisha Naik RN

## 2021-08-18 NOTE — PATIENT INSTRUCTIONS
Goals     1. EDC - Insulin Pump (pt-stated)       Note created  8/18/2021  6:20 PM by Ben Morelos RN      Enter carbohydrate into pump on the weekends.

## 2021-09-15 ENCOUNTER — HOSPITAL ENCOUNTER (OUTPATIENT)
Dept: ENDOCRINOLOGY | Facility: HOSPITAL | Age: 56
Discharge: HOME | End: 2021-09-15
Attending: INTERNAL MEDICINE
Payer: COMMERCIAL

## 2021-09-15 VITALS — WEIGHT: 239.19 LBS | BODY MASS INDEX: 33 KG/M2

## 2021-09-15 DIAGNOSIS — E10.65 UNCONTROLLED TYPE 1 DIABETES MELLITUS WITH HYPERGLYCEMIA (HCC): Primary | ICD-10-CM

## 2021-09-15 NOTE — PROGRESS NOTES
Jacqueline Yoo  : 1965 was seen for Individual Insulin Pump Follow up:Tandem Control IQ    Date: 9/15/2021   Start time: 4:55 pm End time: 5:45 pm    Reviewed pump download with Erick Simental. Blood sugar variability noticed.  He experienced a few lower bl 9/29/2021 Tonia Garcia RD  Follow up recommended in: No follow-ups on file. Patient verbalized understanding and has no further questions at this time.     Subhash Dickson RN

## 2021-09-15 NOTE — PATIENT INSTRUCTIONS
Goals                    Today    1. EDC - Insulin Pump (pt-stated)   Doing What I Said      Note created  8/18/2021  6:20 PM by Kurt Murphy RN      Enter carbohydrate into pump on the weekends.       2.  EDC - Insulin Pump (pt-stated)         Note crea

## 2021-09-29 ENCOUNTER — TELEPHONE (OUTPATIENT)
Dept: ENDOCRINOLOGY | Facility: HOSPITAL | Age: 56
End: 2021-09-29

## 2021-10-05 ENCOUNTER — OFFICE VISIT (OUTPATIENT)
Dept: FAMILY MEDICINE CLINIC | Facility: CLINIC | Age: 56
End: 2021-10-05
Payer: COMMERCIAL

## 2021-10-05 VITALS
HEIGHT: 69 IN | BODY MASS INDEX: 35.7 KG/M2 | RESPIRATION RATE: 18 BRPM | SYSTOLIC BLOOD PRESSURE: 146 MMHG | TEMPERATURE: 98 F | DIASTOLIC BLOOD PRESSURE: 84 MMHG | OXYGEN SATURATION: 96 % | HEART RATE: 70 BPM | WEIGHT: 241 LBS

## 2021-10-05 DIAGNOSIS — R42 DIZZY: ICD-10-CM

## 2021-10-05 DIAGNOSIS — Z23 NEED FOR VACCINATION: ICD-10-CM

## 2021-10-05 DIAGNOSIS — G44.329 CHRONIC POST-TRAUMATIC HEADACHE, NOT INTRACTABLE: ICD-10-CM

## 2021-10-05 DIAGNOSIS — M77.11 LATERAL EPICONDYLITIS OF RIGHT ELBOW: ICD-10-CM

## 2021-10-05 DIAGNOSIS — M54.12 CERVICAL RADICULOPATHY: Primary | ICD-10-CM

## 2021-10-05 DIAGNOSIS — H93.13 TINNITUS OF BOTH EARS: ICD-10-CM

## 2021-10-05 PROCEDURE — 3077F SYST BP >= 140 MM HG: CPT | Performed by: FAMILY MEDICINE

## 2021-10-05 PROCEDURE — 3008F BODY MASS INDEX DOCD: CPT | Performed by: FAMILY MEDICINE

## 2021-10-05 PROCEDURE — 99214 OFFICE O/P EST MOD 30 MIN: CPT | Performed by: FAMILY MEDICINE

## 2021-10-05 PROCEDURE — 90471 IMMUNIZATION ADMIN: CPT | Performed by: FAMILY MEDICINE

## 2021-10-05 PROCEDURE — 90686 IIV4 VACC NO PRSV 0.5 ML IM: CPT | Performed by: FAMILY MEDICINE

## 2021-10-05 PROCEDURE — 3079F DIAST BP 80-89 MM HG: CPT | Performed by: FAMILY MEDICINE

## 2021-10-05 RX ORDER — INSULIN ASPART 100 [IU]/ML
INJECTION, SOLUTION INTRAVENOUS; SUBCUTANEOUS
COMMUNITY
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
## 2021-10-11 ENCOUNTER — TELEPHONE (OUTPATIENT)
Dept: FAMILY MEDICINE CLINIC | Facility: CLINIC | Age: 56
End: 2021-10-11

## 2021-10-11 NOTE — TELEPHONE ENCOUNTER
WANTS TO KNOW IF  WOULD DO A PEER TO PEER FOR MRI OF CERVICAL SPINE AND BRAIN. IT ORGINALY GOT DENIED BY INSURANCE.       Hammad Flores # FOR PEER TO PEER      CASE # [id number]  BRAIN    #  [id number]  SPINE    PLS CALL [facility] BACK

## 2021-10-18 NOTE — TELEPHONE ENCOUNTER
Spoke with [employer], let rep know patient going to neurologist for Cspine MRI. My chart sent to patient.

## 2021-10-20 ENCOUNTER — TELEPHONE (OUTPATIENT)
Dept: ENDOCRINOLOGY CLINIC | Facility: CLINIC | Age: 56
End: 2021-10-20

## 2021-10-20 NOTE — TELEPHONE ENCOUNTER
Received fax from [facility] for refils of CGM supplies. Per patient's chart, he is using Dexcom CGMS. Patient uses [place] for Tandem pump supplies. Sent [facility] to patient asking patient to verify if he is also using [facility] for his CGMS.

## 2021-10-26 ENCOUNTER — PATIENT MESSAGE (OUTPATIENT)
Dept: ENDOCRINOLOGY CLINIC | Facility: CLINIC | Age: 56
End: 2021-10-26

## 2021-10-26 NOTE — TELEPHONE ENCOUNTER
RN faxed prescription order form for Dexcom to [facility] [phone]. Patient has been notified via [employer].

## 2021-10-27 NOTE — TELEPHONE ENCOUNTER
LOV 7/10/21    RTC--date unknown    LR 7/16/21    Dr. Bhakti Hernandez, please advise when you would like to see patient again.

## 2021-10-28 RX ORDER — LEVOTHYROXINE SODIUM 175 UG/1
175 TABLET ORAL DAILY
Qty: 90 TABLET | Refills: 0 | Status: SHIPPED | OUTPATIENT
Start: 2021-10-28 | End: 2021-11-11

## 2021-10-28 RX ORDER — LEVOTHYROXINE SODIUM 175 UG/1
175 TABLET ORAL DAILY
Qty: 90 TABLET | Refills: 0 | OUTPATIENT
Start: 2021-10-28

## 2021-10-28 RX ORDER — LEVOTHYROXINE SODIUM 175 UG/1
TABLET ORAL
Qty: 90 TABLET | Refills: 0 | OUTPATIENT
Start: 2021-10-28

## 2021-11-11 RX ORDER — LEVOTHYROXINE SODIUM 175 UG/1
175 TABLET ORAL DAILY
Qty: 90 TABLET | Refills: 0 | Status: SHIPPED | OUTPATIENT
Start: 2021-11-11 | End: 2022-01-06

## 2021-11-11 RX ORDER — INSULIN LISPRO 100 [IU]/ML
INJECTION, SOLUTION INTRAVENOUS; SUBCUTANEOUS
Qty: 120 ML | Refills: 0 | Status: SHIPPED | OUTPATIENT
Start: 2021-11-11 | End: 2022-01-06

## 2021-11-18 ENCOUNTER — OFFICE VISIT (OUTPATIENT)
Dept: FAMILY MEDICINE CLINIC | Facility: CLINIC | Age: 56
End: 2021-11-18
Payer: COMMERCIAL

## 2021-11-18 VITALS
RESPIRATION RATE: 20 BRPM | WEIGHT: 241 LBS | OXYGEN SATURATION: 98 % | DIASTOLIC BLOOD PRESSURE: 60 MMHG | SYSTOLIC BLOOD PRESSURE: 128 MMHG | HEART RATE: 79 BPM | BODY MASS INDEX: 35.7 KG/M2 | HEIGHT: 69 IN

## 2021-11-18 DIAGNOSIS — M54.12 CERVICAL RADICULOPATHY: Primary | ICD-10-CM

## 2021-11-18 DIAGNOSIS — G44.329 CHRONIC INTERMITTENT POST-TRAUMATIC HEADACHE: ICD-10-CM

## 2021-11-18 DIAGNOSIS — E10.9 TYPE 1 DIABETES MELLITUS WITHOUT COMPLICATION (HCC): ICD-10-CM

## 2021-11-18 DIAGNOSIS — E78.5 HYPERLIPIDEMIA, UNSPECIFIED HYPERLIPIDEMIA TYPE: ICD-10-CM

## 2021-11-18 PROCEDURE — 99214 OFFICE O/P EST MOD 30 MIN: CPT | Performed by: FAMILY MEDICINE

## 2021-11-18 PROCEDURE — 3078F DIAST BP <80 MM HG: CPT | Performed by: FAMILY MEDICINE

## 2021-11-18 PROCEDURE — 3008F BODY MASS INDEX DOCD: CPT | Performed by: FAMILY MEDICINE

## 2021-11-18 PROCEDURE — 3074F SYST BP LT 130 MM HG: CPT | Performed by: FAMILY MEDICINE

## 2021-11-18 RX ORDER — TRAMADOL HYDROCHLORIDE 50 MG/1
50 TABLET ORAL EVERY 6 HOURS PRN
Qty: 20 TABLET | Refills: 0 | Status: SHIPPED | OUTPATIENT
Start: 2021-11-18

## 2021-11-18 RX ORDER — EZETIMIBE 10 MG/1
10 TABLET ORAL NIGHTLY
Qty: 90 TABLET | Refills: 3 | Status: SHIPPED | OUTPATIENT
Start: 2021-11-18

## 2021-11-18 RX ORDER — SUMATRIPTAN 50 MG/1
50 TABLET, FILM COATED ORAL EVERY 2 HOUR PRN
Qty: 20 TABLET | Refills: 0 | Status: SHIPPED | OUTPATIENT
Start: 2021-11-18 | End: 2022-01-12
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
## 2021-12-25 ENCOUNTER — APPOINTMENT (OUTPATIENT)
Dept: CT IMAGING | Facility: HOSPITAL | Age: 56
End: 2021-12-25
Attending: EMERGENCY MEDICINE
Payer: COMMERCIAL

## 2021-12-25 ENCOUNTER — HOSPITAL ENCOUNTER (EMERGENCY)
Facility: HOSPITAL | Age: 56
Discharge: HOME | End: 2021-12-25
Attending: EMERGENCY MEDICINE
Payer: COMMERCIAL

## 2021-12-25 VITALS
HEART RATE: 61 BPM | RESPIRATION RATE: 18 BRPM | TEMPERATURE: 97 F | SYSTOLIC BLOOD PRESSURE: 150 MMHG | BODY MASS INDEX: 33.6 KG/M2 | HEIGHT: 71 IN | WEIGHT: 240 LBS | OXYGEN SATURATION: 97 % | DIASTOLIC BLOOD PRESSURE: 80 MMHG

## 2021-12-25 DIAGNOSIS — N20.0 KIDNEY STONE: Primary | ICD-10-CM

## 2021-12-25 PROCEDURE — 96374 THER/PROPH/DIAG INJ IV PUSH: CPT

## 2021-12-25 PROCEDURE — 80053 COMPREHEN METABOLIC PANEL: CPT | Performed by: EMERGENCY MEDICINE

## 2021-12-25 PROCEDURE — 74176 CT ABD & PELVIS W/O CONTRAST: CPT | Performed by: EMERGENCY MEDICINE

## 2021-12-25 PROCEDURE — 99284 EMERGENCY DEPT VISIT MOD MDM: CPT

## 2021-12-25 PROCEDURE — 81001 URINALYSIS AUTO W/SCOPE: CPT | Performed by: EMERGENCY MEDICINE

## 2021-12-25 PROCEDURE — 96361 HYDRATE IV INFUSION ADD-ON: CPT

## 2021-12-25 PROCEDURE — 85025 COMPLETE CBC W/AUTO DIFF WBC: CPT | Performed by: EMERGENCY MEDICINE

## 2021-12-25 RX ORDER — HYDROCODONE BITARTRATE AND ACETAMINOPHEN 5; 325 MG/1; MG/1
1-2 TABLET ORAL EVERY 6 HOURS PRN
Qty: 10 TABLET | Refills: 0 | Status: SHIPPED | OUTPATIENT
Start: 2021-12-25 | End: 2021-12-30

## 2021-12-25 RX ORDER — TAMSULOSIN HYDROCHLORIDE 0.4 MG/1
0.4 CAPSULE ORAL DAILY
Qty: 7 CAPSULE | Refills: 0 | Status: SHIPPED | OUTPATIENT
Start: 2021-12-25 | End: 2022-01-01

## 2021-12-25 RX ORDER — KETOROLAC TROMETHAMINE 30 MG/ML
30 INJECTION, SOLUTION INTRAMUSCULAR; INTRAVENOUS ONCE
Status: COMPLETED | OUTPATIENT
Start: 2021-12-25 | End: 2021-12-25

## 2021-12-25 NOTE — ED INITIAL ASSESSMENT (HPI)
Pt c/o left flank pain that radiates to the front. Pt c/o burning with urination, decreased frequency in urination. Pt denies N/V/D. Pt has hx of kidney stones.

## 2021-12-25 NOTE — ED PROVIDER NOTES
Patient Seen in: [facility] Emergency Department      History   Patient presents with:  Abdomen/Flank Pain  Urinary Symptoms    Stated Complaint: Abdominal pain since thursday with nausea, burning with urination    Subjective:   HPI    81-year-old m Pupils equal reactive. Extraocular motions intact. Oropharynx clear. Neck: Supple  Lungs: Clear to auscultation bilaterally. Heart: Regular rate and rhythm. Abdomen: Soft, nontender. Skin: No rash. No edema.   Neurologic: No focal neurologic defici since thursday with nausea, burning with urination  TECHNIQUE:  Unenhanced multislice CT scanning from above the kidneys to below the urinary bladder.   2D rendering are generated on the CT scanner workstation to localize potential stones in the cranio-caud MD on 12/25/2021 at 7:31 PM       Medications   ketorolac (TORADOL) 30 MG/ML injection 30 mg (30 mg Intravenous Given 12/25/21 1803)   sodium chloride 0.9% IV bolus 1,000 mL (1,000 mL Intravenous New Bag 12/25/21 1802)     Patient feels better after medica

## 2022-01-04 RX ORDER — LEVOTHYROXINE SODIUM 175 UG/1
175 TABLET ORAL DAILY
Qty: 90 TABLET | Refills: 0 | Status: CANCELLED | OUTPATIENT
Start: 2022-01-04

## 2022-01-05 ENCOUNTER — OFFICE VISIT (OUTPATIENT)
Dept: ENDOCRINOLOGY CLINIC | Facility: CLINIC | Age: 57
End: 2022-01-05
Payer: COMMERCIAL

## 2022-01-05 VITALS
HEIGHT: 71 IN | SYSTOLIC BLOOD PRESSURE: 144 MMHG | HEART RATE: 65 BPM | WEIGHT: 243 LBS | DIASTOLIC BLOOD PRESSURE: 75 MMHG | BODY MASS INDEX: 34.02 KG/M2

## 2022-01-05 DIAGNOSIS — E03.9 HYPOTHYROIDISM, UNSPECIFIED TYPE: ICD-10-CM

## 2022-01-05 DIAGNOSIS — E10.65 UNCONTROLLED TYPE 1 DIABETES MELLITUS WITH HYPERGLYCEMIA (HCC): Primary | ICD-10-CM

## 2022-01-05 DIAGNOSIS — E55.9 VITAMIN D DEFICIENCY: ICD-10-CM

## 2022-01-05 DIAGNOSIS — E83.52 HYPERCALCEMIA: ICD-10-CM

## 2022-01-05 LAB
CARTRIDGE LOT#: ABNORMAL NUMERIC
GLUCOSE BLOOD: 211
HEMOGLOBIN A1C: 7.7 % (ref 4.3–5.6)
TEST STRIP LOT #: NORMAL NUMERIC

## 2022-01-05 PROCEDURE — 3051F HG A1C>EQUAL 7.0%<8.0%: CPT | Performed by: INTERNAL MEDICINE

## 2022-01-05 PROCEDURE — 82947 ASSAY GLUCOSE BLOOD QUANT: CPT | Performed by: INTERNAL MEDICINE

## 2022-01-05 PROCEDURE — 3078F DIAST BP <80 MM HG: CPT | Performed by: INTERNAL MEDICINE

## 2022-01-05 PROCEDURE — 3008F BODY MASS INDEX DOCD: CPT | Performed by: INTERNAL MEDICINE

## 2022-01-05 PROCEDURE — 3077F SYST BP >= 140 MM HG: CPT | Performed by: INTERNAL MEDICINE

## 2022-01-05 PROCEDURE — 99214 OFFICE O/P EST MOD 30 MIN: CPT | Performed by: INTERNAL MEDICINE

## 2022-01-05 PROCEDURE — 83036 HEMOGLOBIN GLYCOSYLATED A1C: CPT | Performed by: INTERNAL MEDICINE

## 2022-01-05 PROCEDURE — 36416 COLLJ CAPILLARY BLOOD SPEC: CPT | Performed by: INTERNAL MEDICINE

## 2022-01-05 NOTE — PROGRESS NOTES
Name: Hermilo Torres  Date: 1/05/22    Referring Physician: No ref.  provider found    INITIAL VISIT  Hermilo Torres is a 55year old male who had presented for evaluation and management of Type 1.5 diabetes that was diagnosed when he was in his 25s and hyp Disease present: yes    : Nephropathy present: none (7/10/21)    Neuro: Neuropathy present: this is new along lateral part of left leg    Skin: Infection or ulceration: no     Osteoporosis: no    Thyroid disease: yes, taking LT4- 175mcg    Medications: each by Does not apply route every 3 (three) months., Disp: 1 each, Rfl: 1  •  ergocalciferol 1.25 MG (71040 UT) Oral Cap, Take 1 capsule (50,000 Units total) by mouth once a week., Disp: 12 capsule, Rfl: 0  •  triamcinolone acetonide 0.1 % External Cream, 1.07 12/25/2021    ANIONGAP 4 12/25/2021    GFR 88 07/23/2018    GFRNAA 78 12/25/2021    GFRAA 90 12/25/2021    CA 10.3 (H) 12/25/2021    OSMOCALC 298 (H) 12/25/2021    ALKPHO 155 (H) 12/25/2021    AST 19 12/25/2021    ALT 26 12/25/2021    BILT 0.5 12/25/2 45gm per meal or 135gm per day. We discussed the importance of trying to follow a Mediterranean diet, with an emphasis on vegetables at every meal, with lots whole grains, and protein from either plant-based sources, or poultry and fish.    - He is on the r

## 2022-01-05 NOTE — PROGRESS NOTES
Name: John King  Date: 7/10/21    Referring Physician: No ref.  provider found    INITIAL VISIT  John King is a 55year old male who had presented for evaluation and management of Type 1.5 diabetes that was diagnosed when he was in his 25s and hyp months: saw in April 2021    CV: Cardiovascular disease present: no         Hypertension present: no, but today elevated         Hyperlipidemia present: yes         Peripheral Vascular Disease present: yes    : Nephropathy present: no    Neuro: Neuropath not apply route Every 10 days. , Disp: 3 each, Rfl: 1  •  Continuous Blood Gluc Transmit (DEXCOM G6 TRANSMITTER) Does not apply Misc, 1 each by Does not apply route every 3 (three) months., Disp: 1 each, Rfl: 1  •  ergocalciferol 1.25 MG (83815 UT) Oral Cap BUN 27 (H) 12/25/2021    BUNCREA 21.9 (H) 07/10/2021    CREATSERUM 1.07 12/25/2021    ANIONGAP 4 12/25/2021    GFR 88 07/23/2018    GFRNAA 78 12/25/2021    GFRAA 90 12/25/2021    CA 10.3 (H) 12/25/2021    OSMOCALC 298 (H) 12/25/2021    ALKPHO 155 (H) 12 per day. We discussed the importance of trying to follow a Mediterranean diet, with an emphasis on vegetables at every meal, with lots whole grains, and protein from either plant-based sources, or poultry and fish. - He is on the road a lot.  He does need

## 2022-01-05 NOTE — TELEPHONE ENCOUNTER
LOV 7/10/2021; RTC date not specified on LOV note. Pt does have a follow up appt scheduled for 1/5/2022 (today) with Elly Carrasco. Refill pending.

## 2022-01-06 PROBLEM — E83.52 HYPERCALCEMIA: Status: ACTIVE | Noted: 2022-01-06

## 2022-01-06 RX ORDER — LEVOTHYROXINE SODIUM 175 UG/1
175 TABLET ORAL DAILY
Qty: 90 TABLET | Refills: 1 | Status: SHIPPED | OUTPATIENT
Start: 2022-01-06

## 2022-01-06 RX ORDER — INSULIN LISPRO 100 [IU]/ML
INJECTION, SOLUTION INTRAVENOUS; SUBCUTANEOUS
Qty: 120 ML | Refills: 1 | Status: SHIPPED | OUTPATIENT
Start: 2022-01-06

## 2022-01-06 NOTE — TELEPHONE ENCOUNTER
Hi  I have ordered labs for this patient. I will refill his prescription after he gets labs done. Thank you!

## 2022-01-07 PROCEDURE — 3052F HG A1C>EQUAL 8.0%<EQUAL 9.0%: CPT | Performed by: FAMILY MEDICINE

## 2022-01-07 PROCEDURE — 3061F NEG MICROALBUMINURIA REV: CPT | Performed by: FAMILY MEDICINE

## 2022-01-08 LAB — HEMOGLOBIN A1C: 8.1 % OF TOTAL HGB

## 2022-01-10 ENCOUNTER — HOSPITAL ENCOUNTER (OUTPATIENT)
Age: 57
Discharge: HOME | End: 2022-01-10
Attending: EMERGENCY MEDICINE
Payer: COMMERCIAL

## 2022-01-10 ENCOUNTER — APPOINTMENT (OUTPATIENT)
Dept: GENERAL RADIOLOGY | Age: 57
End: 2022-01-10
Attending: EMERGENCY MEDICINE
Payer: COMMERCIAL

## 2022-01-10 VITALS
HEART RATE: 78 BPM | BODY MASS INDEX: 34 KG/M2 | TEMPERATURE: 98 F | RESPIRATION RATE: 18 BRPM | OXYGEN SATURATION: 98 % | SYSTOLIC BLOOD PRESSURE: 153 MMHG | DIASTOLIC BLOOD PRESSURE: 79 MMHG | WEIGHT: 243 LBS

## 2022-01-10 DIAGNOSIS — S60.212A CONTUSION OF LEFT WRIST, INITIAL ENCOUNTER: Primary | ICD-10-CM

## 2022-01-10 LAB
ALBUMIN/GLOBULIN RATIO: 1.8 (CALC) (ref 1–2.5)
ALBUMIN: 3.9 G/DL (ref 3.6–5.1)
ALKALINE PHOSPHATASE: 124 U/L (ref 35–144)
ALT: 16 U/L (ref 9–46)
AST: 16 U/L (ref 10–35)
BILIRUBIN, TOTAL: 0.6 MG/DL (ref 0.2–1.2)
BUN/CREATININE RATIO: 28 (CALC) (ref 6–22)
BUN: 29 MG/DL (ref 7–25)
CALCIUM: 10.3 MG/DL (ref 8.6–10.3)
CARBON DIOXIDE: 27 MMOL/L (ref 20–32)
CHLORIDE: 106 MMOL/L (ref 98–110)
CHOL/HDLC RATIO: 3.9 (CALC)
CHOLESTEROL, TOTAL: 224 MG/DL
CREATININE, RANDOM URINE: 116 MG/DL (ref 20–320)
CREATININE: 1.04 MG/DL (ref 0.7–1.33)
EGFR IF AFRICN AM: 93 ML/MIN/1.73M2
EGFR IF NONAFRICN AM: 80 ML/MIN/1.73M2
GLOBULIN: 2.2 G/DL (CALC) (ref 1.9–3.7)
GLUCOSE: 185 MG/DL (ref 65–99)
HDL CHOLESTEROL: 58 MG/DL
LDL-CHOLESTEROL: 140 MG/DL (CALC)
MICROALBUMIN/CREATININE RATIO, RANDOM URINE: 9 MCG/MG CREAT
MICROALBUMIN: 1.1 MG/DL
NON-HDL CHOLESTEROL: 166 MG/DL (CALC)
PARATHYROID HORMONE,$INTACT: 127 PG/ML (ref 14–64)
POTASSIUM: 4.3 MMOL/L (ref 3.5–5.3)
PROTEIN, TOTAL: 6.1 G/DL (ref 6.1–8.1)
SODIUM: 139 MMOL/L (ref 135–146)
T4, FREE: 0.8 NG/DL (ref 0.8–1.8)
TRIGLYCERIDES: 135 MG/DL
TSH: 11.56 MIU/L (ref 0.4–4.5)
VITAMIN D, 25-OH, TOTAL: 25 NG/ML (ref 30–100)

## 2022-01-10 PROCEDURE — 99213 OFFICE O/P EST LOW 20 MIN: CPT

## 2022-01-10 PROCEDURE — 73110 X-RAY EXAM OF WRIST: CPT | Performed by: EMERGENCY MEDICINE

## 2022-01-10 RX ORDER — ACETAMINOPHEN 500 MG
1000 TABLET ORAL ONCE
Status: COMPLETED | OUTPATIENT
Start: 2022-01-10 | End: 2022-01-10
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
## 2022-01-10 NOTE — ED PROVIDER NOTES
Patient Seen in: [facility]      History   Patient presents with:  Fall  Arm or Hand Injury    Stated Complaint: felt light headed & fell yesterday,left wrist pain    Subjective:   HPI    64-year-old male presents to the emergency departm Vital signs were reviewed per nurses notes. Left upper extremity: Neurovascularly intact. Tenderness diffusely over the wrist without significant soft tissue swelling. No snuffbox tenderness.   Limited range of motion of the wrist.  Hand, forearm and elb

## 2022-01-12 ENCOUNTER — OFFICE VISIT (OUTPATIENT)
Dept: PAIN CLINIC | Facility: CLINIC | Age: 57
End: 2022-01-12
Payer: COMMERCIAL

## 2022-01-12 VITALS
OXYGEN SATURATION: 99 % | WEIGHT: 243 LBS | SYSTOLIC BLOOD PRESSURE: 110 MMHG | HEART RATE: 86 BPM | BODY MASS INDEX: 34 KG/M2 | DIASTOLIC BLOOD PRESSURE: 80 MMHG

## 2022-01-12 DIAGNOSIS — M54.12 CERVICAL RADICULITIS: Primary | ICD-10-CM
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
## 2022-01-12 PROCEDURE — 99204 OFFICE O/P NEW MOD 45 MIN: CPT | Performed by: ANESTHESIOLOGY

## 2022-01-12 PROCEDURE — 3074F SYST BP LT 130 MM HG: CPT | Performed by: ANESTHESIOLOGY

## 2022-01-12 PROCEDURE — 3079F DIAST BP 80-89 MM HG: CPT | Performed by: ANESTHESIOLOGY

## 2022-01-12 NOTE — H&P
Name: Gladis Ravi   : 1965   DOS: 2022     Chief complaint: Cervical radiculopathy    History of present illness:  Gladis Ravi is a 55year old right-handed male with history of diabetes who presents today for evaluation of neck pain wit Glucose Blood (CONTOUR NEXT TEST) In Vitro Strip Test 4-5 times daily 500 each 0   • insulin glargine (LANTUS SOLOSTAR) 100 UNIT/ML Subcutaneous Solution Pen-injector Inject 48 Units into the skin nightly.  45 mL 0   • Insulin Pen Needle (PEN NEEDLES) 32G X 5    5  Finger Extension:     5    5  Finger Flexsion:      5    5    Cardiovascular system: Regular rate and rhythm. S1, S2 normal. No murmurs. Respiratory system: Breath sounds equal bilaterally. No crackles, rhonchi.   Abdomen: Soft, nontender, no

## 2022-01-12 NOTE — PROGRESS NOTES
Subjective:   Patient ID: Ela Lewis is a 55year old male.     HPI    History/Other:   Review of Systems  Current Outpatient Medications   Medication Sig Dispense Refill   • Insulin Lispro (HUMALOG) 100 UNIT/ML Subcutaneous Solution Inject via insulin 1 week, then 1 week break before reusing 60 g 3   • ASCENSIA MICROFILL TEST In Vitro Strip TEST SIX TIMES DAILY 200 Strip 2     Allergies:  Statins                 MYALGIA  Bee Venom                   Objective:   Physical Exam  Constitutional:

## 2022-01-14 NOTE — PATIENT INSTRUCTIONS
Arrived to the [facility]. Taxol completed. Patient tolerated well. Any issues or concerns during appointment: none. Patient aware of next infusion appointment on 1/21/22 (date) at 0900 (time). Patient aware of next lab and [facility] office visit on 2/2/22 (date) at 12 (time). Patient instructed to call provider with temperature of 100.4 or greater or nausea/vomiting/ diarrhea or pain not controlled by medications  Discharged ambulatory accompanied by daughter in law. Refill policies:    • Allow 2-3 business days for refills; controlled substances may take longer.   • Contact your pharmacy at least 5 days prior to running out of medication and have them send an electronic request or submit request through the “request re Depending on your insurance carrier, approval may take 3-10 days. It is highly recommended patients contact their insurance carrier directly to determine coverage.   If test is done without insurance authorization, patient may be responsible for the entire

## 2022-02-02 RX ORDER — LEVOTHYROXINE SODIUM 175 UG/1
175 TABLET ORAL DAILY
Qty: 90 TABLET | Refills: 0 | OUTPATIENT
Start: 2022-02-02

## 2022-02-02 NOTE — TELEPHONE ENCOUNTER
Dr. Joy Mccabe, patient had labs done and this Rx was already filled on 1/6/22. Please refuse/ cancel this current pending Rx. Thanks.

## 2022-02-04 ENCOUNTER — HOSPITAL ENCOUNTER (OUTPATIENT)
Dept: MRI IMAGING | Age: 57
Discharge: HOME | End: 2022-02-04
Attending: ANESTHESIOLOGY
Payer: COMMERCIAL

## 2022-02-04 DIAGNOSIS — M54.12 CERVICAL RADICULITIS: ICD-10-CM

## 2022-02-04 PROCEDURE — 72141 MRI NECK SPINE W/O DYE: CPT | Performed by: ANESTHESIOLOGY

## 2022-02-07 ENCOUNTER — OFFICE VISIT (OUTPATIENT)
Dept: NEUROLOGY | Facility: CLINIC | Age: 57
End: 2022-02-07
Payer: COMMERCIAL

## 2022-02-07 VITALS
HEART RATE: 64 BPM | SYSTOLIC BLOOD PRESSURE: 132 MMHG | BODY MASS INDEX: 34 KG/M2 | WEIGHT: 243 LBS | DIASTOLIC BLOOD PRESSURE: 72 MMHG | RESPIRATION RATE: 16 BRPM

## 2022-02-07 DIAGNOSIS — M54.40 CHRONIC BILATERAL LOW BACK PAIN WITH SCIATICA, SCIATICA LATERALITY UNSPECIFIED: ICD-10-CM

## 2022-02-07 DIAGNOSIS — R20.2 NUMBNESS AND TINGLING OF LEFT LOWER EXTREMITY: ICD-10-CM

## 2022-02-07 DIAGNOSIS — M54.2 NECK PAIN: ICD-10-CM

## 2022-02-07 DIAGNOSIS — G89.29 CHRONIC MIDLINE LOW BACK PAIN WITH SCIATICA, SCIATICA LATERALITY UNSPECIFIED: ICD-10-CM

## 2022-02-07 DIAGNOSIS — M50.122 CERVICAL DISC DISORDER AT C5-C6 LEVEL WITH RADICULOPATHY: ICD-10-CM

## 2022-02-07 DIAGNOSIS — R20.0 NUMBNESS AND TINGLING OF LEFT LOWER EXTREMITY: ICD-10-CM

## 2022-02-07 DIAGNOSIS — G89.29 CHRONIC BILATERAL LOW BACK PAIN WITH SCIATICA, SCIATICA LATERALITY UNSPECIFIED: ICD-10-CM
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
## 2022-02-07 DIAGNOSIS — M54.40 CHRONIC MIDLINE LOW BACK PAIN WITH SCIATICA, SCIATICA LATERALITY UNSPECIFIED: ICD-10-CM

## 2022-02-07 PROBLEM — M54.89 MIDLINE BACK PAIN: Status: ACTIVE | Noted: 2022-02-07

## 2022-02-07 PROBLEM — M54.9 CHRONIC BACK PAIN: Status: ACTIVE | Noted: 2022-02-07

## 2022-02-07 PROCEDURE — 3078F DIAST BP <80 MM HG: CPT | Performed by: OTHER

## 2022-02-07 PROCEDURE — 3075F SYST BP GE 130 - 139MM HG: CPT | Performed by: OTHER

## 2022-02-07 PROCEDURE — 99204 OFFICE O/P NEW MOD 45 MIN: CPT | Performed by: OTHER

## 2022-02-07 RX ORDER — TOPIRAMATE 25 MG/1
25 TABLET ORAL DAILY
Qty: 30 TABLET | Refills: 5 | Status: SHIPPED | OUTPATIENT
Start: 2022-02-07

## 2022-02-07 RX ORDER — NAPROXEN 500 MG/1
500 TABLET ORAL 2 TIMES DAILY WITH MEALS
Qty: 60 TABLET | Refills: 0 | Status: SHIPPED | OUTPATIENT
Start: 2022-02-07
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
## 2022-04-12 ENCOUNTER — OFFICE VISIT (OUTPATIENT)
Dept: FAMILY MEDICINE CLINIC | Facility: CLINIC | Age: 57
End: 2022-04-12
Payer: COMMERCIAL

## 2022-04-12 ENCOUNTER — LAB ENCOUNTER (OUTPATIENT)
Dept: LAB | Age: 57
End: 2022-04-12
Attending: FAMILY MEDICINE
Payer: COMMERCIAL

## 2022-04-12 VITALS
BODY MASS INDEX: 37.3 KG/M2 | OXYGEN SATURATION: 98 % | WEIGHT: 249 LBS | HEIGHT: 68.5 IN | RESPIRATION RATE: 20 BRPM | TEMPERATURE: 98 F | SYSTOLIC BLOOD PRESSURE: 144 MMHG | HEART RATE: 72 BPM | DIASTOLIC BLOOD PRESSURE: 78 MMHG

## 2022-04-12 DIAGNOSIS — E03.9 HYPOTHYROIDISM, UNSPECIFIED TYPE: ICD-10-CM

## 2022-04-12 DIAGNOSIS — E10.9 TYPE 1 DIABETES MELLITUS WITHOUT COMPLICATION (HCC): ICD-10-CM

## 2022-04-12 DIAGNOSIS — M25.561 ACUTE PAIN OF RIGHT KNEE: Primary | ICD-10-CM

## 2022-04-12 LAB
EST. AVERAGE GLUCOSE BLD GHB EST-MCNC: 180 MG/DL (ref 68–126)
HBA1C MFR BLD: 7.9 % (ref ?–5.7)
TSI SER-ACNC: 2.31 MIU/ML (ref 0.36–3.74)

## 2022-04-12 PROCEDURE — 36415 COLL VENOUS BLD VENIPUNCTURE: CPT | Performed by: FAMILY MEDICINE

## 2022-04-12 PROCEDURE — 84443 ASSAY THYROID STIM HORMONE: CPT | Performed by: FAMILY MEDICINE

## 2022-04-12 PROCEDURE — 3008F BODY MASS INDEX DOCD: CPT | Performed by: FAMILY MEDICINE

## 2022-04-12 PROCEDURE — 99214 OFFICE O/P EST MOD 30 MIN: CPT | Performed by: FAMILY MEDICINE

## 2022-04-12 PROCEDURE — 83036 HEMOGLOBIN GLYCOSYLATED A1C: CPT | Performed by: FAMILY MEDICINE

## 2022-04-12 PROCEDURE — 3051F HG A1C>EQUAL 7.0%<8.0%: CPT | Performed by: PHYSICIAN ASSISTANT

## 2022-04-12 PROCEDURE — 3077F SYST BP >= 140 MM HG: CPT | Performed by: FAMILY MEDICINE

## 2022-04-12 PROCEDURE — 3078F DIAST BP <80 MM HG: CPT | Performed by: FAMILY MEDICINE

## 2022-04-12 RX ORDER — NAPROXEN 500 MG/1
500 TABLET ORAL 2 TIMES DAILY WITH MEALS
Qty: 60 TABLET | Refills: 0 | Status: SHIPPED | OUTPATIENT
Start: 2022-04-12

## 2022-04-28 ENCOUNTER — TELEMEDICINE (OUTPATIENT)
Dept: FAMILY MEDICINE CLINIC | Facility: CLINIC | Age: 57
End: 2022-04-28
Payer: COMMERCIAL

## 2022-04-28 DIAGNOSIS — U07.1 COVID-19: Primary | ICD-10-CM

## 2022-04-28 PROCEDURE — 99213 OFFICE O/P EST LOW 20 MIN: CPT | Performed by: FAMILY MEDICINE

## 2022-06-27 NOTE — TELEPHONE ENCOUNTER
levothyroxine 175 MCG Oral Tab, Take 1 tablet (175 mcg total) by mouth daily. , Disp: 90 tablet, Rfl: 1    Notes to prescriber: PT WANTS REFILLS

## 2022-06-28 RX ORDER — LEVOTHYROXINE SODIUM 175 UG/1
175 TABLET ORAL DAILY
Qty: 90 TABLET | Refills: 1 | Status: SHIPPED | OUTPATIENT
Start: 2022-06-28

## 2022-06-28 NOTE — TELEPHONE ENCOUNTER
LOV: 1/5/22    RTC: \"He will make an appointment with GISELL Diez, KENNA as soon as he gets his new pump\"     FU: No FU Appt Scheduled     Last Refill: 1/6/22    6 Month Supply Pending

## 2022-07-18 ENCOUNTER — OFFICE VISIT (OUTPATIENT)
Dept: FAMILY MEDICINE CLINIC | Facility: CLINIC | Age: 57
End: 2022-07-18
Payer: COMMERCIAL

## 2022-07-18 VITALS — RESPIRATION RATE: 18 BRPM | WEIGHT: 249 LBS | BODY MASS INDEX: 37.3 KG/M2 | HEIGHT: 68.5 IN | OXYGEN SATURATION: 99 %

## 2022-07-18 DIAGNOSIS — L02.611 ABSCESS OF RIGHT FOOT: Primary | ICD-10-CM

## 2022-07-18 PROCEDURE — 99213 OFFICE O/P EST LOW 20 MIN: CPT | Performed by: PHYSICIAN ASSISTANT

## 2022-07-18 PROCEDURE — 3008F BODY MASS INDEX DOCD: CPT | Performed by: PHYSICIAN ASSISTANT

## 2022-07-18 RX ORDER — CEPHALEXIN 500 MG/1
500 CAPSULE ORAL 2 TIMES DAILY
Qty: 20 CAPSULE | Refills: 0 | Status: SHIPPED | OUTPATIENT
Start: 2022-07-18 | End: 2022-07-28

## 2022-08-03 ENCOUNTER — PATIENT MESSAGE (OUTPATIENT)
Dept: ENDOCRINOLOGY CLINIC | Facility: CLINIC | Age: 57
End: 2022-08-03

## 2022-08-03 DIAGNOSIS — E03.9 HYPOTHYROIDISM, UNSPECIFIED TYPE: ICD-10-CM

## 2022-08-03 DIAGNOSIS — E55.9 VITAMIN D DEFICIENCY: ICD-10-CM

## 2022-08-03 DIAGNOSIS — E10.65 UNCONTROLLED TYPE 1 DIABETES MELLITUS WITH HYPERGLYCEMIA (HCC): Primary | ICD-10-CM

## 2022-08-03 NOTE — TELEPHONE ENCOUNTER
From: Amber Montalvo  To: Melissa Liu MD  Sent: 8/3/2022 6:05 AM CDT  Subject: Blood work    When I set up my appointment I asked for blood work to be set up and vitamin d to be added I have not heard back if that was done can someone please reply back so I know if it happened

## 2022-08-31 ENCOUNTER — PATIENT MESSAGE (OUTPATIENT)
Dept: ENDOCRINOLOGY CLINIC | Facility: CLINIC | Age: 57
End: 2022-08-31

## 2022-08-31 ENCOUNTER — OFFICE VISIT (OUTPATIENT)
Dept: ENDOCRINOLOGY CLINIC | Facility: CLINIC | Age: 57
End: 2022-08-31
Payer: COMMERCIAL

## 2022-08-31 VITALS
DIASTOLIC BLOOD PRESSURE: 77 MMHG | RESPIRATION RATE: 16 BRPM | HEIGHT: 68.5 IN | HEART RATE: 70 BPM | SYSTOLIC BLOOD PRESSURE: 150 MMHG | BODY MASS INDEX: 37.3 KG/M2 | WEIGHT: 249 LBS

## 2022-08-31 DIAGNOSIS — E66.01 CLASS 2 SEVERE OBESITY DUE TO EXCESS CALORIES WITH SERIOUS COMORBIDITY AND BODY MASS INDEX (BMI) OF 37.0 TO 37.9 IN ADULT (HCC): ICD-10-CM

## 2022-08-31 DIAGNOSIS — E11.65 TYPE 2 DIABETES MELLITUS WITH HYPERGLYCEMIA, WITH LONG-TERM CURRENT USE OF INSULIN (HCC): ICD-10-CM

## 2022-08-31 DIAGNOSIS — E83.52 HYPERCALCEMIA: ICD-10-CM

## 2022-08-31 DIAGNOSIS — Z79.4 TYPE 2 DIABETES MELLITUS WITH HYPERGLYCEMIA, WITH LONG-TERM CURRENT USE OF INSULIN (HCC): ICD-10-CM

## 2022-08-31 DIAGNOSIS — E34.9 ELEVATED PARATHYROID HORMONE: ICD-10-CM

## 2022-08-31 DIAGNOSIS — E03.9 HYPOTHYROIDISM, UNSPECIFIED TYPE: ICD-10-CM

## 2022-08-31 DIAGNOSIS — E78.5 HYPERLIPIDEMIA, UNSPECIFIED HYPERLIPIDEMIA TYPE: ICD-10-CM

## 2022-08-31 DIAGNOSIS — E55.9 VITAMIN D DEFICIENCY: ICD-10-CM

## 2022-08-31 DIAGNOSIS — E10.65 UNCONTROLLED TYPE 1 DIABETES MELLITUS WITH HYPERGLYCEMIA (HCC): Primary | ICD-10-CM

## 2022-08-31 PROBLEM — R79.89 ELEVATED PARATHYROID HORMONE: Status: ACTIVE | Noted: 2022-08-31

## 2022-08-31 PROBLEM — E66.812 CLASS 2 SEVERE OBESITY DUE TO EXCESS CALORIES WITH SERIOUS COMORBIDITY AND BODY MASS INDEX (BMI) OF 37.0 TO 37.9 IN ADULT (HCC): Status: ACTIVE | Noted: 2022-08-31

## 2022-08-31 LAB
CARTRIDGE LOT#: ABNORMAL NUMERIC
GLUCOSE BLOOD: 122
HEMOGLOBIN A1C: 7.6 % (ref 4.3–5.6)
TEST STRIP LOT #: NORMAL NUMERIC

## 2022-08-31 RX ORDER — SEMAGLUTIDE 1.34 MG/ML
0.25 INJECTION, SOLUTION SUBCUTANEOUS WEEKLY
Qty: 1.5 ML | Refills: 0 | Status: SHIPPED | OUTPATIENT
Start: 2022-08-31 | End: 2022-09-30

## 2022-09-01 ENCOUNTER — TELEPHONE (OUTPATIENT)
Dept: ENDOCRINOLOGY CLINIC | Facility: CLINIC | Age: 57
End: 2022-09-01

## 2022-09-01 LAB
ALBUMIN/GLOBULIN RATIO: 2 (CALC) (ref 1–2.5)
ALBUMIN: 4.1 G/DL (ref 3.6–5.1)
ALKALINE PHOSPHATASE: 133 U/L (ref 35–144)
ALT: 18 U/L (ref 9–46)
AST: 18 U/L (ref 10–35)
BILIRUBIN, TOTAL: 0.8 MG/DL (ref 0.2–1.2)
BUN: 21 MG/DL (ref 7–25)
CALCIUM: 10.9 MG/DL (ref 8.6–10.3)
CARBON DIOXIDE: 29 MMOL/L (ref 20–32)
CHLORIDE: 106 MMOL/L (ref 98–110)
CHOL/HDLC RATIO: 3.9 (CALC)
CHOLESTEROL, TOTAL: 225 MG/DL
CREATININE, RANDOM URINE: 94 MG/DL (ref 20–320)
CREATININE: 1.02 MG/DL (ref 0.7–1.3)
EGFR: 87 ML/MIN/1.73M2
GLOBULIN: 2.1 G/DL (CALC) (ref 1.9–3.7)
GLUCOSE: 127 MG/DL (ref 65–99)
HDL CHOLESTEROL: 57 MG/DL
HEMOGLOBIN A1C: 7.4 % OF TOTAL HGB
LDL-CHOLESTEROL: 148 MG/DL (CALC)
MICROALBUMIN/CREATININE RATIO, RANDOM URINE: 6 MCG/MG CREAT
MICROALBUMIN: 0.6 MG/DL
NON-HDL CHOLESTEROL: 168 MG/DL (CALC)
POTASSIUM: 4.7 MMOL/L (ref 3.5–5.3)
PROTEIN, TOTAL: 6.2 G/DL (ref 6.1–8.1)
SODIUM: 140 MMOL/L (ref 135–146)
T4, FREE: 1.1 NG/DL (ref 0.8–1.8)
TRIGLYCERIDES: 91 MG/DL
TSH W/REFLEX TO FT4: 5.61 MIU/L (ref 0.4–4.5)
VITAMIN D, 25-OH, TOTAL: 34 NG/ML (ref 30–100)

## 2022-09-01 NOTE — TELEPHONE ENCOUNTER
Semaglutide,0.25 or 0.5MG/DOS, (OZEMPIC, 0.25 OR 0.5 MG/DOSE,) 2 MG/1.5ML Subcutaneous Solution Pen-injector, Inject 0.25 mg into the skin once a week., Disp: 1.5 mL, Rfl: 0    KEY: Chrsit Marin

## 2022-09-02 NOTE — TELEPHONE ENCOUNTER
Medication PA Requested:  Ozempic 2mg/1.5 ml                                                         CoverMyMeds Used:  Key:  Quantity: 1.5 ml  Day Supply: 28 days  Sig: Inject 0.25 mg once a week   DX Code:  E10.65                                   CPT code (if applicable):   Case Number/Pending Ref#:

## 2022-09-07 ENCOUNTER — TELEPHONE (OUTPATIENT)
Dept: ENDOCRINOLOGY CLINIC | Facility: CLINIC | Age: 57
End: 2022-09-07

## 2022-09-07 DIAGNOSIS — E78.5 HYPERLIPIDEMIA, UNSPECIFIED HYPERLIPIDEMIA TYPE: Primary | ICD-10-CM

## 2022-09-07 DIAGNOSIS — E83.52 HYPERCALCEMIA: ICD-10-CM

## 2022-09-07 DIAGNOSIS — E34.9 ELEVATED PARATHYROID HORMONE: ICD-10-CM

## 2022-09-07 DIAGNOSIS — E10.65 UNCONTROLLED TYPE 1 DIABETES MELLITUS WITH HYPERGLYCEMIA (HCC): ICD-10-CM

## 2022-09-07 DIAGNOSIS — E03.9 HYPOTHYROIDISM, UNSPECIFIED TYPE: ICD-10-CM

## 2022-09-07 DIAGNOSIS — E55.9 VITAMIN D DEFICIENCY: ICD-10-CM

## 2022-09-07 RX ORDER — LEVOTHYROXINE SODIUM 0.2 MG/1
200 TABLET ORAL
Qty: 90 TABLET | Refills: 0 | Status: SHIPPED | OUTPATIENT
Start: 2022-09-07 | End: 2022-12-06

## 2022-09-07 NOTE — TELEPHONE ENCOUNTER
Hi!    Please let patient know that I have reviewed all of his labs. 1.) The 'good cholesterol' and TGL levels are very good. The 'bad cholesterol' is not at goal. Please ask him why he is not on a statin. I would like to start him on one.    2.) His TSH is still not at goal, I would like to increase his levothyroxine from 175 to 200mcg PO qday. 3.) He is still showing hypercalcemia even though I have normalized his vitamin D levels. I would like to repeat labs in 3 months, but this time, I would like him to start cholecalciferol 2,000 international units daily, and I would like him to perform a 24-hour urinary calcium and creatinine, and do the blood tests the morning that he submits the urine sample. Please ask him to make sure that he is well-hydrated. Please ensure that ionized Ca and 24-hour calcium can be performed at [facility].    Thank you!

## 2022-09-07 NOTE — TELEPHONE ENCOUNTER
Medication PA Requested:  Ozempic 2mg/1.5 ml                                                         CoverMyMeds Used:  Key:KEY: [id number]  Quantity: 1.5 ml  Day Supply: 28 days  Sig: Inject 0.25 mg once a week   DX Code: E66.01, Z68.37,  E10.65             Faxed [employer] PA form with LOV, a1c 8/31/2022  Awaiting determination.

## 2022-09-09 ENCOUNTER — PATIENT MESSAGE (OUTPATIENT)
Dept: ENDOCRINOLOGY CLINIC | Facility: CLINIC | Age: 57
End: 2022-09-09

## 2022-09-09 RX ORDER — EZETIMIBE 10 MG/1
10 TABLET ORAL NIGHTLY
Qty: 90 TABLET | Refills: 0 | Status: SHIPPED | OUTPATIENT
Start: 2022-09-09 | End: 2022-12-08

## 2022-09-09 NOTE — TELEPHONE ENCOUNTER
Hi!  Please let patient know that I have prescribed the ezetimibe. It seems that in the past statins had caused him muscle pains. Thank you!

## 2022-09-09 NOTE — TELEPHONE ENCOUNTER
From: Reese Cole  Sent: 9/7/2022 11:40 AM CDT  To: Sharmila Jj Clinical Staff  Subject: New Prescription    Has the insurance company or [facility] send any paperwork for the new prescription yet   Thank you

## 2022-09-09 NOTE — TELEPHONE ENCOUNTER
Patient was notified of all instructions with understanding. He has taken ezetimibe in the past, but he ran out and never refilled. No history of statin use. He is willing to try statin. [facility] contacted. They are able to perform 24 hr urine calcium and ionized calcium tests. Patient will have completed in 3 months.

## 2022-09-12 ENCOUNTER — TELEPHONE (OUTPATIENT)
Dept: ENDOCRINOLOGY CLINIC | Facility: CLINIC | Age: 57
End: 2022-09-12

## 2022-09-12 DIAGNOSIS — E66.01 CLASS 2 SEVERE OBESITY DUE TO EXCESS CALORIES WITH SERIOUS COMORBIDITY AND BODY MASS INDEX (BMI) OF 37.0 TO 37.9 IN ADULT (HCC): Primary | ICD-10-CM

## 2022-09-12 NOTE — TELEPHONE ENCOUNTER
Called [facility] [phone], spoke with Gladys JIMENES. She states pharmacist denied this medication but can restart PA for Ref # is [id number]. Medication PA Requested:  Ozempic 2mg/1.5 ml                                                         CoverMyMeds Used:  Key:  Quantity: 1.5 ml  Day Supply: 28 days  Sig: Inject 0.25 mg once a week   DX Code:  E10.65, E66.01, Z68.37     Answered additional questions and faxed clinical again to [phone],  With new case # [id number]. Awaiting determination.

## 2022-09-12 NOTE — TELEPHONE ENCOUNTER
Received this message for Dr. Robert Griffin patient    Provider Prior Authorization Notice    Per your health plan's criteria, this drug is covered if you meet the following: The plan allows us to cover a drug only when it is used for a ''medically accepted indication. '' A medically accepted indication means the use is approved by the Food and Drug Administration (FDA) or one of the following medical resources: (1) Two articles from major peer-reviewed medical journals. (2) The [address] [place] [address]. (3) DRUGDEX System by [employer]. Renzo Dial is not FDA approved or supported by an accepted medical resource for the treatment of your medical condition(s): a disease of high blood sugar levels (type 1 diabetes) and weight loss. The information provided does not show that you meet the criteria listed above. Please speak with your doctor about your choices. Reviewed by: Andrez Jenkins **Please note: There are covered drug(s) under the plan to treat your condition. You may want to speak to your doctor about the following drug(s): Wegovy and Saxenda. **Please note: The drug(s) listed above may require additional review.

## 2022-09-15 NOTE — TELEPHONE ENCOUNTER
Dr. Debora Wilson -- per  it's not going to be available until towards the end of the year. No exact date has been provided. Please advise.

## 2022-09-15 NOTE — TELEPHONE ENCOUNTER
Gemma, [phone], spoke with Bria Hall. I gave her the case # [id number], which I was given on 9/12/2022. Bria Hall states this PA # is for different patient and different medication. She states correct Ozempic PA # is [id number] and it was denied on 9/12/2022. She will refax copy of denial rationale letter to [phone].   Call ref # [id number]

## 2022-09-19 RX ORDER — LIRAGLUTIDE 6 MG/ML
INJECTION, SOLUTION SUBCUTANEOUS
Qty: 42 ML | Refills: 0 | Status: SHIPPED | OUTPATIENT
Start: 2022-09-19 | End: 2022-12-16

## 2022-09-19 RX ORDER — PEN NEEDLE, DIABETIC 30 GX3/16"
1 NEEDLE, DISPOSABLE MISCELLANEOUS DAILY
Qty: 90 EACH | Refills: 0 | Status: SHIPPED | OUTPATIENT
Start: 2022-09-19 | End: 2022-12-18

## 2022-09-19 NOTE — TELEPHONE ENCOUNTER
Dr. Antonio Bailon,     It looks like Newton Cordero is on patient's formulary but still requires PA. Please advise on order so PA team can start with processing PA. Thank you.

## 2022-09-19 NOTE — TELEPHONE ENCOUNTER
Hi!    Shilpa Garcia can be tried, but how about Saxenda? I think his insurance company had also suggested? Please look into this. Thank you!

## 2022-09-19 NOTE — TELEPHONE ENCOUNTER
Dr. Adriana Samuel    Please advise.  Can patient try [facility] with savings program?

## 2022-09-20 NOTE — TELEPHONE ENCOUNTER
Please refer to 9/12/22 TE regarding Edgard SALAS RN sent [employer] message regarding Sahra Irby. Will respond to patient once determination from insurance Is received.

## 2022-09-21 ENCOUNTER — PATIENT MESSAGE (OUTPATIENT)
Dept: ENDOCRINOLOGY CLINIC | Facility: CLINIC | Age: 57
End: 2022-09-21

## 2022-09-21 NOTE — TELEPHONE ENCOUNTER
Dr. Yuli Blevins -- please advise if there is a specific time you prefer the patient to inject Saxenda. Thank you.

## 2022-09-22 NOTE — TELEPHONE ENCOUNTER
Received fax from [facility], states the approval of Saxenda Inj 18mg/ 3ml from now until 3/20/2023. Sent pt [employer] message. Approval sent to scanning.

## 2022-09-23 ENCOUNTER — TELEPHONE (OUTPATIENT)
Dept: ENDOCRINOLOGY CLINIC | Facility: CLINIC | Age: 57
End: 2022-09-23

## 2022-09-23 NOTE — TELEPHONE ENCOUNTER
Liraglutide -Weight Management (SAXENDA) 18 MG/3ML Subcutaneous Solution Pen-injector, Inject 0.6 mg into the skin daily for 7 days, THEN 1.2 mg daily for 7 days, THEN 1.8 mg daily for 7 days, THEN 2.4 mg daily for 7 days, THEN 3 mg daily. , Disp: 42 mL, Rfl: 0    KEY: [id number]

## 2022-09-23 NOTE — TELEPHONE ENCOUNTER
Per insurance max daily does for Vilma Enriquez is . 5  And attach urgent to the new rx sent - pt needs it for the weekend   Another PA is needed for dosing quantity   Phone for PA - [phone]

## 2022-10-05 ENCOUNTER — TELEPHONE (OUTPATIENT)
Dept: ENDOCRINOLOGY CLINIC | Facility: CLINIC | Age: 57
End: 2022-10-05

## 2022-10-27 ENCOUNTER — TELEPHONE (OUTPATIENT)
Dept: SURGERY | Facility: CLINIC | Age: 57
End: 2022-10-27

## 2022-11-03 NOTE — TELEPHONE ENCOUNTER
Per pharmacist, patient has been getting Joesphine Poor, insurance will only cover one month at a time. Following up with patient.

## 2022-11-29 NOTE — TELEPHONE ENCOUNTER
LOV: 8/31/2022    RTC: 6 months     F/U: 1/11/2023    Dr Rajni Solis-- orders pending, approve if appropriate

## 2022-12-05 ENCOUNTER — OFFICE VISIT (OUTPATIENT)
Dept: PAIN CLINIC | Facility: CLINIC | Age: 57
End: 2022-12-05
Payer: COMMERCIAL

## 2022-12-05 VITALS
BODY MASS INDEX: 37 KG/M2 | WEIGHT: 249 LBS | SYSTOLIC BLOOD PRESSURE: 142 MMHG | HEART RATE: 78 BPM | OXYGEN SATURATION: 98 % | DIASTOLIC BLOOD PRESSURE: 80 MMHG
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
## 2022-12-05 DIAGNOSIS — M54.2 CERVICALGIA: Primary | ICD-10-CM

## 2022-12-05 DIAGNOSIS — M54.12 CERVICAL RADICULITIS: ICD-10-CM

## 2022-12-05 PROCEDURE — 99214 OFFICE O/P EST MOD 30 MIN: CPT | Performed by: ANESTHESIOLOGY

## 2022-12-05 PROCEDURE — 3077F SYST BP >= 140 MM HG: CPT | Performed by: ANESTHESIOLOGY

## 2022-12-05 PROCEDURE — 3079F DIAST BP 80-89 MM HG: CPT | Performed by: ANESTHESIOLOGY

## 2022-12-05 NOTE — PROGRESS NOTES
Patient presents in office today with reported pain in cervical spine    Current pain level reported = 9/10    Last reported dose of tramadol not for several months. Naproxen last week      Narcotic Contract renewal none    Urine Drug screen none

## 2022-12-06 ENCOUNTER — TELEPHONE (OUTPATIENT)
Dept: NEUROLOGY | Facility: CLINIC | Age: 57
End: 2022-12-06

## 2022-12-06 NOTE — TELEPHONE ENCOUNTER
Prior authorization request completed for: NATHEN   Authorization # [id number]  Authorization dates: 12/6/2022 - 3/6/2023  CPT codes approved: 02146  Number of visits/dates of service approved: 1  Physician: Dr. Sharla Cassidy   Location: THE [facility]     Patient can be scheduled. Routed to Navigator.

## 2022-12-11 DIAGNOSIS — E03.9 HYPOTHYROIDISM, UNSPECIFIED TYPE: ICD-10-CM

## 2022-12-15 RX ORDER — LEVOTHYROXINE SODIUM 0.2 MG/1
TABLET ORAL
Qty: 90 TABLET | Refills: 0 | Status: SHIPPED | OUTPATIENT
Start: 2022-12-15

## 2022-12-15 NOTE — TELEPHONE ENCOUNTER
LOV: 8/31/22    Future Appointments   Date Time Provider Antonina Valerie   1/11/2023  5:30 PM Noel Holland MD [facility]     Refilled per protocol.

## 2023-01-04 ENCOUNTER — OFFICE VISIT (OUTPATIENT)
Dept: NEUROLOGY | Facility: CLINIC | Age: 58
End: 2023-01-04
Payer: COMMERCIAL

## 2023-01-04 VITALS
RESPIRATION RATE: 16 BRPM | SYSTOLIC BLOOD PRESSURE: 130 MMHG | HEART RATE: 74 BPM | BODY MASS INDEX: 38 KG/M2 | WEIGHT: 251 LBS | DIASTOLIC BLOOD PRESSURE: 70 MMHG

## 2023-01-04 DIAGNOSIS — R51.9 OCCIPITAL HEADACHE: ICD-10-CM

## 2023-01-04 DIAGNOSIS — M54.16 LEFT LUMBAR RADICULOPATHY: ICD-10-CM

## 2023-01-04 DIAGNOSIS — M50.122 CERVICAL DISC DISORDER AT C5-C6 LEVEL WITH RADICULOPATHY: ICD-10-CM

## 2023-01-04 PROCEDURE — 3078F DIAST BP <80 MM HG: CPT | Performed by: OTHER

## 2023-01-04 PROCEDURE — 99214 OFFICE O/P EST MOD 30 MIN: CPT | Performed by: OTHER

## 2023-01-04 PROCEDURE — 3075F SYST BP GE 130 - 139MM HG: CPT | Performed by: OTHER

## 2023-01-04 RX ORDER — GABAPENTIN 300 MG/1
300 CAPSULE ORAL NIGHTLY
Qty: 30 CAPSULE | Refills: 2 | Status: SHIPPED | OUTPATIENT
Start: 2023-01-04

## 2023-01-04 RX ORDER — SUMATRIPTAN 50 MG/1
TABLET, FILM COATED ORAL
Qty: 9 TABLET | Refills: 2 | Status: SHIPPED | OUTPATIENT
Start: 2023-01-04

## 2023-01-04 NOTE — PROGRESS NOTES
Patient states decrease in frequency of burning sensation of the left leg. Patient states dizziness while looking up. Patient states migraines have not change, patient had brain fog with topamax, this medication was discontinued. No changes in BUE numbness or tingling. Denies recent falls or head trauma.

## 2023-01-11 ENCOUNTER — OFFICE VISIT (OUTPATIENT)
Dept: ENDOCRINOLOGY CLINIC | Facility: CLINIC | Age: 58
End: 2023-01-11

## 2023-01-11 ENCOUNTER — TELEPHONE (OUTPATIENT)
Dept: ENDOCRINOLOGY CLINIC | Facility: CLINIC | Age: 58
End: 2023-01-11

## 2023-01-11 VITALS
HEART RATE: 86 BPM | WEIGHT: 250 LBS | BODY MASS INDEX: 37 KG/M2 | DIASTOLIC BLOOD PRESSURE: 78 MMHG | SYSTOLIC BLOOD PRESSURE: 150 MMHG

## 2023-01-11 DIAGNOSIS — E78.5 HYPERLIPIDEMIA, UNSPECIFIED HYPERLIPIDEMIA TYPE: ICD-10-CM

## 2023-01-11 DIAGNOSIS — E89.0 POSTABLATIVE HYPOTHYROIDISM: ICD-10-CM

## 2023-01-11 DIAGNOSIS — E10.65 UNCONTROLLED TYPE 1 DIABETES MELLITUS WITH HYPERGLYCEMIA (HCC): Primary | ICD-10-CM

## 2023-01-11 DIAGNOSIS — E34.9 ELEVATED PARATHYROID HORMONE: ICD-10-CM

## 2023-01-11 DIAGNOSIS — E66.01 CLASS 2 SEVERE OBESITY DUE TO EXCESS CALORIES WITH SERIOUS COMORBIDITY AND BODY MASS INDEX (BMI) OF 37.0 TO 37.9 IN ADULT (HCC): ICD-10-CM

## 2023-01-11 DIAGNOSIS — E55.9 VITAMIN D DEFICIENCY: ICD-10-CM

## 2023-01-11 LAB
CARTRIDGE LOT#: ABNORMAL NUMERIC
GLUCOSE BLOOD: 216
HEMOGLOBIN A1C: 6.8 % (ref 4.3–5.6)
TEST STRIP LOT #: NORMAL NUMERIC

## 2023-01-11 PROCEDURE — 82947 ASSAY GLUCOSE BLOOD QUANT: CPT | Performed by: INTERNAL MEDICINE

## 2023-01-11 PROCEDURE — 3077F SYST BP >= 140 MM HG: CPT | Performed by: INTERNAL MEDICINE

## 2023-01-11 PROCEDURE — 99214 OFFICE O/P EST MOD 30 MIN: CPT | Performed by: INTERNAL MEDICINE

## 2023-01-11 PROCEDURE — 3044F HG A1C LEVEL LT 7.0%: CPT | Performed by: INTERNAL MEDICINE

## 2023-01-11 PROCEDURE — 83036 HEMOGLOBIN GLYCOSYLATED A1C: CPT | Performed by: INTERNAL MEDICINE

## 2023-01-11 PROCEDURE — 3078F DIAST BP <80 MM HG: CPT | Performed by: INTERNAL MEDICINE

## 2023-01-11 RX ORDER — SEMAGLUTIDE 0.25 MG/.5ML
0.25 INJECTION, SOLUTION SUBCUTANEOUS WEEKLY
Qty: 4 EACH | Refills: 0 | Status: SHIPPED | OUTPATIENT
Start: 2023-01-11

## 2023-01-12 NOTE — TELEPHONE ENCOUNTER
Called and spoke with pt. Pt is currently on tandem. States he is noticing leaking/bad site after 2 days of insertion. Area is healing within 30 minutes of removal.     He believes he is on autosoft 90. Reviewed it sounds like it is the infusion set and tandem offers different infusion sets with different lengths of cannulas and angles as well. Will send email to Alan Stringer to reach out to pt to review different infusion sets and angels. Advised to reach out if she does not call within a couple of days.

## 2023-01-31 ENCOUNTER — TELEPHONE (OUTPATIENT)
Dept: ENDOCRINOLOGY CLINIC | Facility: CLINIC | Age: 58
End: 2023-01-31

## 2023-01-31 NOTE — TELEPHONE ENCOUNTER
Received fax from [facility] requesting recent chart notes also attached is a physician order form for diabetic supplies. Form placed in providers folder fore review and signature.

## 2023-02-04 ENCOUNTER — TELEPHONE (OUTPATIENT)
Dept: ENDOCRINOLOGY CLINIC | Facility: CLINIC | Age: 58
End: 2023-02-04

## 2023-02-09 ENCOUNTER — PATIENT MESSAGE (OUTPATIENT)
Dept: ENDOCRINOLOGY CLINIC | Facility: CLINIC | Age: 58
End: 2023-02-09

## 2023-02-10 NOTE — TELEPHONE ENCOUNTER
Dr Nakia Marion,    I am assuming OK to start but I don't see it listed in the [address] so wanted to confirm with you

## 2023-02-11 NOTE — TELEPHONE ENCOUNTER
Hi! Yes. I had started him on Saxenda, but he could not tolerate this, so I prescribed [facility]FORT MIGUEL for him. I wasn't sure if it would be approved or not. It is actually in my last not under problem number 7.). Thank you.

## 2023-02-16 NOTE — TELEPHONE ENCOUNTER
Gemma, [phone], spoke with Senthil Omalley, states [id number] was approved 2/9/2023-9/9/2023. She will refax approval letter to [phone]. [address] [place] [address], spoke with Nicole. She states went through.     My chart message sent to patient of [facility]FORT MIGUEL approval.

## 2023-03-09 DIAGNOSIS — E66.01 CLASS 2 SEVERE OBESITY DUE TO EXCESS CALORIES WITH SERIOUS COMORBIDITY AND BODY MASS INDEX (BMI) OF 37.0 TO 37.9 IN ADULT (HCC): ICD-10-CM

## 2023-03-10 RX ORDER — SEMAGLUTIDE 0.25 MG/.5ML
INJECTION, SOLUTION SUBCUTANEOUS
Qty: 4 ML | Refills: 0 | OUTPATIENT
Start: 2023-03-10

## 2023-03-10 RX ORDER — SEMAGLUTIDE 0.25 MG/.5ML
0.5 INJECTION, SOLUTION SUBCUTANEOUS WEEKLY
Qty: 4 ML | Refills: 0 | Status: SHIPPED | OUTPATIENT
Start: 2023-03-10

## 2023-03-10 NOTE — TELEPHONE ENCOUNTER
Patient states his rx was declined & requesting to speak with RN. Please call - pt states he will be out of town next week. Thank you.

## 2023-03-10 NOTE — TELEPHONE ENCOUNTER
LOV; 01/11/23    RTC;6months    F/U;04/12/23     Pending Monthly Supply;order pending, approve if appropriate.

## 2023-03-10 NOTE — TELEPHONE ENCOUNTER
Spoke to Eder. States no refills from 1/11/23. They did not received Rx that was sent yesterday. Re-sent BIJYMX Rx to [facility]. Called patient and updated, patient has 1 pen left. He will update clinic if Rx is not received at [facility].

## 2023-03-11 ENCOUNTER — TELEPHONE (OUTPATIENT)
Dept: ENDOCRINOLOGY CLINIC | Facility: CLINIC | Age: 58
End: 2023-03-11

## 2023-03-14 NOTE — TELEPHONE ENCOUNTER
Per chart review - patient on Wegovy (PA approved 2/16/23) and:   I had also started him on Saxenda, and he had noted better blood sugars, but he was not able to tolerate the medication due to GI side effects

## 2023-03-26 ENCOUNTER — PATIENT MESSAGE (OUTPATIENT)
Dept: ENDOCRINOLOGY CLINIC | Facility: CLINIC | Age: 58
End: 2023-03-26

## 2023-03-26 DIAGNOSIS — E66.01 CLASS 2 SEVERE OBESITY DUE TO EXCESS CALORIES WITH SERIOUS COMORBIDITY AND BODY MASS INDEX (BMI) OF 37.0 TO 37.9 IN ADULT (HCC): Primary | ICD-10-CM

## 2023-03-28 ENCOUNTER — TELEPHONE (OUTPATIENT)
Dept: ENDOCRINOLOGY CLINIC | Facility: CLINIC | Age: 58
End: 2023-03-28

## 2023-03-28 NOTE — TELEPHONE ENCOUNTER
Received fax from [employer] requesting  Recent chart notes and signature from provider, forms placed in folder for signature.

## 2023-03-30 ENCOUNTER — TELEPHONE (OUTPATIENT)
Dept: INTERNAL MEDICINE CLINIC | Facility: CLINIC | Age: 58
End: 2023-03-30

## 2023-03-30 NOTE — TELEPHONE ENCOUNTER
Received signed documents and I [facility] to [facility] to fax # [phone], and receive a successful confirmation, document were placed in yellow scan bag.

## 2023-04-03 RX ORDER — SEMAGLUTIDE 2.4 MG/.75ML
2.4 INJECTION, SOLUTION SUBCUTANEOUS WEEKLY
Qty: 9 ML | Refills: 3 | Status: SHIPPED | OUTPATIENT
Start: 2023-04-03

## 2023-04-03 RX ORDER — SEMAGLUTIDE 1 MG/.5ML
1 INJECTION, SOLUTION SUBCUTANEOUS WEEKLY
Qty: 2 ML | Refills: 0 | Status: SHIPPED | OUTPATIENT
Start: 2023-04-03 | End: 2023-05-03

## 2023-04-03 RX ORDER — SEMAGLUTIDE 1.7 MG/.75ML
1.7 INJECTION, SOLUTION SUBCUTANEOUS WEEKLY
Qty: 3 ML | Refills: 0 | Status: SHIPPED | OUTPATIENT
Start: 2023-04-03 | End: 2023-05-03

## 2023-04-03 NOTE — TELEPHONE ENCOUNTER
Received medical records request from [facility] for ALL records. Original sent to [facility] via fax. Copy sent to scanning.

## 2023-04-04 NOTE — TELEPHONE ENCOUNTER
Hi!    The patient should be on the 0.5mg dose, currently. Please see if we can get a Dexcom download. I would like to see if we need to make adjustments in his pump settings. I would also like to prescribe the [facility]SARIKA RIVERA so that he does not have any gaps in treatment. Thank you!

## 2023-04-05 NOTE — TELEPHONE ENCOUNTER
Received fax from [address], attached is SEVERINO hummel for [facility]FORT MIGUEL 2.4mg stating the following: \"You do not meet the clinical requirements for this medication. \" Denial placed in provider folder for further review. Dr Houston Mccabe-- see above. Please advise.

## 2023-04-05 NOTE — TELEPHONE ENCOUNTER
Received another fax from [facility] stating PA request has been cancelled due to the following: \"Wegovy 1.7mg has been approved from 2/9/2023-9/9/2023. \" informed pt via [facility]. Rationale sent to scanning. Dr Rajni Solis-- please disregard previous message. Pt approved for 1.7 mg dose.

## 2023-04-12 ENCOUNTER — OFFICE VISIT (OUTPATIENT)
Dept: ENDOCRINOLOGY CLINIC | Facility: CLINIC | Age: 58
End: 2023-04-12

## 2023-04-12 VITALS — HEIGHT: 70 IN | BODY MASS INDEX: 36.22 KG/M2 | WEIGHT: 253 LBS

## 2023-04-12 DIAGNOSIS — E55.9 VITAMIN D DEFICIENCY: ICD-10-CM

## 2023-04-12 DIAGNOSIS — E34.9 ELEVATED PARATHYROID HORMONE: ICD-10-CM

## 2023-04-12 DIAGNOSIS — E66.01 CLASS 2 SEVERE OBESITY DUE TO EXCESS CALORIES WITH SERIOUS COMORBIDITY AND BODY MASS INDEX (BMI) OF 36.0 TO 36.9 IN ADULT (HCC): ICD-10-CM

## 2023-04-12 DIAGNOSIS — E78.5 HYPERLIPIDEMIA, UNSPECIFIED HYPERLIPIDEMIA TYPE: ICD-10-CM

## 2023-04-12 DIAGNOSIS — E83.52 HYPERCALCEMIA: ICD-10-CM

## 2023-04-12 DIAGNOSIS — E03.9 HYPOTHYROIDISM, UNSPECIFIED TYPE: ICD-10-CM

## 2023-04-12 DIAGNOSIS — E10.65 UNCONTROLLED TYPE 1 DIABETES MELLITUS WITH HYPERGLYCEMIA (HCC): Primary | ICD-10-CM

## 2023-04-12 PROBLEM — E66.812 CLASS 2 SEVERE OBESITY DUE TO EXCESS CALORIES WITH SERIOUS COMORBIDITY AND BODY MASS INDEX (BMI) OF 36.0 TO 36.9 IN ADULT (HCC): Status: ACTIVE | Noted: 2022-08-31

## 2023-04-12 LAB
CARTRIDGE LOT#: ABNORMAL NUMERIC
GLUCOSE BLOOD: 113
HEMOGLOBIN A1C: 7.1 % (ref 4.3–5.6)
TEST STRIP LOT #: NORMAL NUMERIC

## 2023-04-12 PROCEDURE — 3008F BODY MASS INDEX DOCD: CPT | Performed by: INTERNAL MEDICINE

## 2023-04-12 PROCEDURE — 99214 OFFICE O/P EST MOD 30 MIN: CPT | Performed by: INTERNAL MEDICINE

## 2023-04-12 PROCEDURE — 83036 HEMOGLOBIN GLYCOSYLATED A1C: CPT | Performed by: INTERNAL MEDICINE

## 2023-04-12 PROCEDURE — 3051F HG A1C>EQUAL 7.0%<8.0%: CPT | Performed by: INTERNAL MEDICINE

## 2023-04-12 PROCEDURE — 82947 ASSAY GLUCOSE BLOOD QUANT: CPT | Performed by: INTERNAL MEDICINE

## 2023-04-13 ENCOUNTER — OFFICE VISIT (OUTPATIENT)
Dept: NEUROLOGY | Facility: CLINIC | Age: 58
End: 2023-04-13
Payer: COMMERCIAL

## 2023-04-13 VITALS
BODY MASS INDEX: 37 KG/M2 | DIASTOLIC BLOOD PRESSURE: 70 MMHG | SYSTOLIC BLOOD PRESSURE: 138 MMHG | RESPIRATION RATE: 16 BRPM | WEIGHT: 259.63 LBS | HEART RATE: 72 BPM

## 2023-04-13 DIAGNOSIS — M50.122 CERVICAL DISC DISORDER AT C5-C6 LEVEL WITH RADICULOPATHY: Primary | ICD-10-CM

## 2023-04-13 DIAGNOSIS — M54.16 LEFT LUMBAR RADICULOPATHY: ICD-10-CM

## 2023-04-13 PROCEDURE — 99213 OFFICE O/P EST LOW 20 MIN: CPT | Performed by: OTHER

## 2023-04-13 PROCEDURE — 3075F SYST BP GE 130 - 139MM HG: CPT | Performed by: OTHER

## 2023-04-13 PROCEDURE — 3078F DIAST BP <80 MM HG: CPT | Performed by: OTHER

## 2023-04-13 RX ORDER — GABAPENTIN 300 MG/1
CAPSULE ORAL
Qty: 90 CAPSULE | Refills: 5 | Status: SHIPPED | OUTPATIENT
Start: 2023-04-13

## 2023-04-13 NOTE — PROGRESS NOTES
Daily headache, lasting about 1.5 hours. Using migraine medication 3-4 times/week. Sumatriptan not effective. Gabapentin was also not effective.

## 2023-05-10 ENCOUNTER — TELEPHONE (OUTPATIENT)
Dept: ENDOCRINOLOGY CLINIC | Facility: CLINIC | Age: 58
End: 2023-05-10

## 2023-05-10 NOTE — TELEPHONE ENCOUNTER
Received a fax from [employer] attached is a prescription form for [facility], forms placed in provider folder for review and signature.

## 2023-05-10 NOTE — TELEPHONE ENCOUNTER
Gustavo from [employer] is looking for the order and office notes for patients cgm supplies    Fax number [phone]

## 2023-05-10 NOTE — TELEPHONE ENCOUNTER
Received fax from [employer] attached is a CGM form for Diabetes supplies, form placed in provider folder for review and signature.

## 2023-06-19 LAB
ALBUMIN/GLOBULIN RATIO: 2 (CALC) (ref 1–2.5)
ALBUMIN: 4 G/DL (ref 3.6–5.1)
ALKALINE PHOSPHATASE: 149 U/L (ref 35–144)
ALT: 14 U/L (ref 9–46)
AST: 15 U/L (ref 10–35)
BILIRUBIN, TOTAL: 0.7 MG/DL (ref 0.2–1.2)
BUN: 23 MG/DL (ref 7–25)
CALCIUM: 11.1 MG/DL (ref 8.6–10.3)
CARBON DIOXIDE: 30 MMOL/L (ref 20–32)
CHLORIDE: 104 MMOL/L (ref 98–110)
CHOL/HDLC RATIO: 3.8 (CALC)
CHOLESTEROL, TOTAL: 200 MG/DL
CREATININE: 1.11 MG/DL (ref 0.7–1.3)
EGFR: 78 ML/MIN/1.73M2
GLOBULIN: 2 G/DL (CALC) (ref 1.9–3.7)
GLUCOSE: 184 MG/DL (ref 65–99)
HDL CHOLESTEROL: 53 MG/DL
LDL-CHOLESTEROL: 122 MG/DL (CALC)
NON-HDL CHOLESTEROL: 147 MG/DL (CALC)
PARATHYROID HORMONE,$INTACT: 118 PG/ML (ref 16–77)
PHOSPHATE (AS PHOSPHORUS): 2.8 MG/DL (ref 2.5–4.5)
POTASSIUM: 4.5 MMOL/L (ref 3.5–5.3)
PROTEIN, TOTAL: 6 G/DL (ref 6.1–8.1)
SODIUM: 140 MMOL/L (ref 135–146)
T4, FREE: 1.2 NG/DL (ref 0.8–1.8)
TRIGLYCERIDES: 139 MG/DL
TSH: 1.01 MIU/L (ref 0.4–4.5)
VITAMIN D, 25-OH, TOTAL: 34 NG/ML (ref 30–100)

## 2023-06-28 ENCOUNTER — TELEPHONE (OUTPATIENT)
Dept: ENDOCRINOLOGY CLINIC | Facility: CLINIC | Age: 58
End: 2023-06-28

## 2023-06-28 DIAGNOSIS — E34.9 ELEVATED PARATHYROID HORMONE: Primary | ICD-10-CM

## 2023-06-28 DIAGNOSIS — E83.52 HYPERCALCEMIA: ICD-10-CM

## 2023-07-04 LAB
CALCIUM, 24 HOUR URINE: 595 MG/24 H
CALCIUM/CREATININE RATIO: 374 MG/G CREAT (ref 30–210)
CREATININE, 24 HOUR URINE: 1.59 G/24 H (ref 0.5–2.15)

## 2023-07-05 ENCOUNTER — PATIENT MESSAGE (OUTPATIENT)
Dept: ENDOCRINOLOGY CLINIC | Facility: CLINIC | Age: 58
End: 2023-07-05

## 2023-07-05 NOTE — TELEPHONE ENCOUNTER
Dr. Natasha Redding    Please advise    Component      Latest Ref Rng 7/3/2023   CALCIUM/CREATININE RATIO      30 - 210 mg/g creat 374 (H)    CALCIUM, 24 HOUR URINE      mg/24 h 595 (H)    CREATININE, 24 HOUR URINE      0.50 - 2.15 g/24 h 1.59       Legend:  (H) High

## 2023-07-06 NOTE — TELEPHONE ENCOUNTER
Hi!  Please let patient know that this is characteristic of primary hyperparathyroidism. The increase in PTH (parathyroid hormone) leads to an increase in calcium in the bloodstream and then an increase in urinary excretion of the same. Thank you!

## 2023-07-07 NOTE — TELEPHONE ENCOUNTER
[facility] sent with message below - patient advised to contact clinic with questions.

## 2023-07-08 NOTE — TELEPHONE ENCOUNTER
Hi!  Please let him know that once the imaging studies are completed, we will come up with a plan for definitive treatment. Thank you!

## 2023-07-10 DIAGNOSIS — E03.9 HYPOTHYROIDISM, UNSPECIFIED TYPE: ICD-10-CM

## 2023-07-10 RX ORDER — LEVOTHYROXINE SODIUM 0.2 MG/1
200 TABLET ORAL
Qty: 90 TABLET | Refills: 0 | Status: SHIPPED | OUTPATIENT
Start: 2023-07-10

## 2023-07-10 NOTE — TELEPHONE ENCOUNTER
LOV: 4/12/23    RTC:  6 Months    FU: 8/28/23    Last Refill: 3/9/23    3 Month Supply Pending       MD is currently out of office will route to APRN for assistance

## 2023-07-29 ENCOUNTER — HOSPITAL ENCOUNTER (OUTPATIENT)
Dept: BONE DENSITY | Age: 58
Discharge: HOME | End: 2023-07-29
Attending: INTERNAL MEDICINE
Payer: COMMERCIAL

## 2023-07-29 ENCOUNTER — HOSPITAL ENCOUNTER (OUTPATIENT)
Dept: ULTRASOUND IMAGING | Age: 58
Discharge: HOME | End: 2023-07-29
Attending: INTERNAL MEDICINE
Payer: COMMERCIAL

## 2023-07-29 DIAGNOSIS — E83.52 HYPERCALCEMIA: ICD-10-CM

## 2023-07-29 DIAGNOSIS — R79.89 ELEVATED PARATHYROID HORMONE: ICD-10-CM

## 2023-07-29 PROCEDURE — 76770 US EXAM ABDO BACK WALL COMP: CPT | Performed by: INTERNAL MEDICINE

## 2023-07-29 PROCEDURE — 77080 DXA BONE DENSITY AXIAL: CPT | Performed by: INTERNAL MEDICINE

## 2023-08-28 ENCOUNTER — OFFICE VISIT (OUTPATIENT)
Dept: ENDOCRINOLOGY CLINIC | Facility: CLINIC | Age: 58
End: 2023-08-28

## 2023-08-28 ENCOUNTER — TELEPHONE (OUTPATIENT)
Dept: ENDOCRINOLOGY CLINIC | Facility: CLINIC | Age: 58
End: 2023-08-28

## 2023-08-28 VITALS
SYSTOLIC BLOOD PRESSURE: 153 MMHG | BODY MASS INDEX: 33.96 KG/M2 | WEIGHT: 237.19 LBS | DIASTOLIC BLOOD PRESSURE: 75 MMHG | HEART RATE: 66 BPM | HEIGHT: 70 IN

## 2023-08-28 DIAGNOSIS — E21.0 PRIMARY HYPERPARATHYROIDISM (HCC): ICD-10-CM

## 2023-08-28 DIAGNOSIS — E03.9 HYPOTHYROIDISM, UNSPECIFIED TYPE: ICD-10-CM

## 2023-08-28 DIAGNOSIS — E55.9 VITAMIN D DEFICIENCY: ICD-10-CM

## 2023-08-28 DIAGNOSIS — E78.5 HYPERLIPIDEMIA, UNSPECIFIED HYPERLIPIDEMIA TYPE: ICD-10-CM

## 2023-08-28 DIAGNOSIS — E10.65 UNCONTROLLED TYPE 1 DIABETES MELLITUS WITH HYPERGLYCEMIA (HCC): Primary | ICD-10-CM

## 2023-08-28 LAB
CARTRIDGE LOT#: ABNORMAL NUMERIC
GLUCOSE BLOOD: 198
HEMOGLOBIN A1C: 7.2 % (ref 4.3–5.6)
TEST STRIP LOT #: NORMAL NUMERIC

## 2023-08-28 PROCEDURE — 83036 HEMOGLOBIN GLYCOSYLATED A1C: CPT | Performed by: INTERNAL MEDICINE

## 2023-08-28 PROCEDURE — 3077F SYST BP >= 140 MM HG: CPT | Performed by: INTERNAL MEDICINE

## 2023-08-28 PROCEDURE — 3051F HG A1C>EQUAL 7.0%<8.0%: CPT | Performed by: INTERNAL MEDICINE

## 2023-08-28 PROCEDURE — 82947 ASSAY GLUCOSE BLOOD QUANT: CPT | Performed by: INTERNAL MEDICINE

## 2023-08-28 PROCEDURE — 99214 OFFICE O/P EST MOD 30 MIN: CPT | Performed by: INTERNAL MEDICINE

## 2023-08-28 PROCEDURE — 3008F BODY MASS INDEX DOCD: CPT | Performed by: INTERNAL MEDICINE

## 2023-08-28 PROCEDURE — 3078F DIAST BP <80 MM HG: CPT | Performed by: INTERNAL MEDICINE

## 2023-08-28 RX ORDER — LEVOTHYROXINE SODIUM 0.2 MG/1
200 TABLET ORAL
Qty: 90 TABLET | Refills: 3 | Status: SHIPPED | OUTPATIENT
Start: 2023-08-28

## 2023-08-28 RX ORDER — ATORVASTATIN CALCIUM 10 MG/1
10 TABLET, FILM COATED ORAL NIGHTLY
Qty: 90 TABLET | Refills: 0 | Status: SHIPPED | OUTPATIENT
Start: 2023-08-28 | End: 2023-11-26

## 2023-08-28 NOTE — TELEPHONE ENCOUNTER
Medication PA Requested:   SDGFCR 0.25MG/O.5ML auto-injectors. CoverMyMeds Used:  Key: [id number]   Quantity: 9 mL   Day Supply: 90 days   Sig:  Inject 0.75 mL (2.4 mg total) into the skin once a week.    DX Code:    E10.65                                  CPT code (if applicable):   Case Number/Pending Ref#:

## 2023-08-28 NOTE — TELEPHONE ENCOUNTER
Per [employer] patient's prior Lara Angel is set to  for :    Corinne Vashti 0.25MG/O.5ML auto-injectors.     Key: Shameka All

## 2023-08-28 NOTE — PROGRESS NOTES
Name: Ela Lewis  Date: 8/28/23    Referring Physician: No ref. provider found    INITIAL VISIT  Ela Lewis is a 65year old male who had presented for evaluation and management of Type 1.5 diabetes that was diagnosed when he was in his 25s and hypothyroidism. At that visit, he had been having some complaints regarding his pump not connecting to the Guardian, and so subsequent to our visit, we changed him over to the Tandem pump. He has been doing much better on this, with less low blood sugars. In fact his blood sugars have been much better controlled. I had also started him on Saxenda, and he had noted better blood sugars, but he was not able to tolerate the medication due to GI side effects. I then started him on Wegovy. He is currently on the 2.4mg dose. He is having some scar tissue and difficulty with insertion sites, but he is following up on this. Blood Glucose Today: 198  HbA1C or glycohemoglobin is 7.2 today (and it was 7.1 on 4/12/23 and it was 6.8 on 1/11/23 and it was 7.6 on 8/31/22 and it was 7.7 on 1/05/22 and it was 8.5 % on 6/16/21and it was 8.0 % in 11/16/21)  Type 1 or Type 2?: Type 1.5    He has been checking blood sugar 5 times a day,and he uses the Dexcom now. Medications for DM:  He has the Tandem pump    Dietary compliance: good:  Occasionally:  Breakfast- [employer], [facility] ([employer] brings it home); beef stroganoff, tacos, chicken; no vegetables    Exercise: has time but does not  Has an apple watch    Polyuria/polydipsia: none    Blurred vision: none    Flu Vaccine This Season: yes, received Covid vaccine     REVIEW OF SYSTEMS  Eyes: Diabetic retinopathy present: none            Most recent visit to eye doctor in last 12 months: saw recently    CV: Cardiovascular disease present: no         Hypertension present: no, but today elevated         Hyperlipidemia present: LDL elevated, on meds (6/19/23)         Peripheral Vascular Disease present: yes    : Nephropathy present: none (8/31/22); creatinine- 1.11    Neuro: Neuropathy present: this is new along lateral part of left leg    Skin: Infection or ulceration: no     Osteoporosis: no; vitamin D- 34 (6/16/23)    Thyroid disease: yes, taking LT4- 200mcg; TSH- 1.01 (6/16/23)    Medications:     Current Outpatient Medications:     levothyroxine 200 MCG Oral Tab, Take 1 tablet (200 mcg total) by mouth before breakfast., Disp: 90 tablet, Rfl: 0    semaglutide-weight management (WEGOVY) 2.4 MG/0.75ML Subcutaneous Solution Auto-injector, Inject 0.75 mL (2.4 mg total) into the skin once a week., Disp: 9 mL, Rfl: 3    insulin lispro (HUMALOG) 100 UNIT/ML Subcutaneous Solution, Inject via insulin pump. Maximum 130 units daily, Disp: 120 mL, Rfl: 1    semaglutide-weight management (WEGOVY) 1.7 MG/0.75ML Subcutaneous Solution Auto-injector, Inject 0.75 mL (1.7 mg total) into the skin once a week., Disp: 4 each, Rfl: 0    gabapentin 300 MG Oral Cap, Take 300 mg AM and 600 mg PM, Disp: 90 capsule, Rfl: 5    Glucose Blood (CONTOUR NEXT TEST) In Vitro Strip, Test 4-5 times daily, Disp: 500 each, Rfl: 0    Insulin Pen Needle (PEN NEEDLES) 32G X 6 MM Does not apply Misc, 1 each by Does not apply route 4 (four) times daily. Use to inject insulin 4 times daily . , Disp: 400 each, Rfl: 0    Continuous Blood Gluc Transmit (DEXCOM G6 TRANSMITTER) Does not apply Misc, 1 each by Does not apply route every 3 (three) months., Disp: 1 each, Rfl: 1    ASCENSIA MICROFILL TEST In Vitro Strip, TEST SIX TIMES DAILY, Disp: 200 Strip, Rfl: 2     Allergies:     Statins                 MYALGIA  Bee Venom                   Social History:   Social History     Socioeconomic History    Marital status:    Tobacco Use    Smoking status: Never    Smokeless tobacco: Never   Vaping Use    Vaping Use: Never used   Substance and Sexual Activity    Alcohol use: No    Drug use: No   Other Topics Concern    Caffeine Concern Yes Comment: soda 16oz/day    Exercise No       Medical History:   Past Medical History:   Diagnosis Date    Personal history of urinary calculi     Type I (juvenile type) diabetes mellitus without mention of complication, not stated as uncontrolled     Type II or unspecified type diabetes mellitus without mention of complication, not stated as uncontrolled     Unspecified septicemia(038.9)    Hypothyroidism  Vitamin D Defeiciency    Surgical history:   Past Surgical History:   Procedure Laterality Date    APPENDECTOMY      COLONOSCOPY  1/1/08    OTHER      lithotripsy    OTHER SURGICAL HISTORY      bilat knees torn miniscus         PHYSICAL EXAM   08/28/23  0908   BP: 145/78   Pulse: 65   Weight: 237 lb 3.2 oz (107.6 kg)   Height: 5' 10\" (1.778 m)         General Appearance:  alert, well developed, in no acute distress  Eyes:  normal conjunctivae, sclera. , normal sclera and normal pupils  Psychiatric:  oriented to time, self, and place  Nutritional:  no abnormal weight gain or loss    Lab Data:   Lab Results   Component Value Date     (H) 04/12/2022    A1C 7.1 (A) 04/12/2023     Lab Results   Component Value Date     (H) 06/16/2023    BUN 23 06/16/2023    BUNCREA NOT APPLICABLE 49/68/1178    CREATSERUM 1.11 06/16/2023    ANIONGAP 4 12/25/2021    GFR 88 07/23/2018    GFRNAA 80 01/07/2022    GFRAA 93 01/07/2022    CA 11.1 (H) 06/16/2023    OSMOCALC 298 (H) 12/25/2021    ALKPHO 149 (H) 06/16/2023    AST 15 06/16/2023    ALT 14 06/16/2023    BILT 0.7 06/16/2023    TP 6.0 (L) 06/16/2023    ALB 4.0 06/16/2023    GLOBULIN 2.0 06/16/2023    AGRATIO 2.0 06/16/2023     06/16/2023    K 4.5 06/16/2023     06/16/2023    CO2 30 06/16/2023     Lab Results   Component Value Date    CHOLEST 200 (H) 06/16/2023    TRIG 139 06/16/2023    HDL 53 06/16/2023     (H) 06/16/2023    VLDL 16 07/10/2021    TCHDLRATIO 3.8 06/16/2023    NONHDLC 147 (H) 06/16/2023     Lab Results   Component Value Date    MALBP 1.81 07/10/2021    CREUR 125.00 07/10/2021    MALBCRECALC 3.0 07/23/2018    MICROALBUMIN 0.6 08/31/2022    CREAUR 94 08/31/2022    MALBCREACALC 6 08/31/2022     ASSESSMENT/PLAN:    This is a 48year-old man here for evaluation and management of uncontrolled  type 2 diabetes. We discussed the ABCs of DM. He also has hypothyroidism. 1.) Hyperglycemia Management- We discussed the importance of glycemic control to prevent complications of diabetes. We discussed the importance of SBGM. - The new Tandem pump is working well for him, he is having less hypoglycemia  - Continue same settings for now, and change only the ICR from 1:20 to 1:16      2.) Management of Diabetic Complications- We discussed the complications of diabetes include retinopathy, neuropathy, nephropathy and cardiovascular disease. - up to date with flu vaccine, received Covid vaccine   - Ophtho up to date  - MAC- will check in 6 months  - Lipid panel- will check in 6 months (I have started him on atorvastatin 10mg to be taken every other day)  - CMP- will check in 6 months  - CAD- none  - BP- not at goal, on meds, will monitor  - Neuropathy- he has this    3.) Lifestyle Management for Diabetes- We discussed importance of a low CHO diet, and recommend 45gm per meal or 135gm per day. We discussed the importance of trying to follow a Mediterranean diet, with an emphasis on vegetables at every meal, with lots whole grains, and protein from either plant-based sources, or poultry and fish. - He is on the road a lot. He is eating better  - I asked him to try and make some time for exercise; maybe log steps everyday.  He said that he would try    4.) Hypothyroidism  - Continue same dose for now, check TSH and FT4 in one year    5.) Vitamin D deficiency  - At goal    6.) Primary Hyperparathyroidism- Refer to Dr. Orly Smith    7.) Obesity- I started patient on Wegovy    Return to clinic in 6 months    Prior to this encounter, I spent over 15 minutes with preparing for the visit, including reviewing documents from other specialties as well as from PCP and going over test results. During the face to face encounter, I spent an additional 15 minutes which were determined for follow-up. Greater than 50% of the time was spent in counseling, anticipatory guidance, and coordination of care. Patient concerns were answered to the best of my knowledge.        8/28/23  Collette First Wenceslao Bloom

## 2023-08-29 NOTE — TELEPHONE ENCOUNTER
Endo staff, patient last approved for Elissa Sanders with diagnosis of Obesity. Per Dr. Joy Mccabe note 8/28. Pt on wegovy for weight loss. Please confirm whether you wish to use diagnosis code of Type 1 diabetes or Obesity for PA. Thank you. Medication PA Requested:   KHEBXW 0.25MG/O.5ML auto-injectors. CoverMyMeds Used:  Key: [id number]   [id number]    Quantity: 9 mL   Day Supply: 90 days   Sig:  Inject 0.75 mL (2.4 mg total) into the skin once a week.    DX Code:    E10.65

## 2023-08-31 NOTE — TELEPHONE ENCOUNTER
Medication PA Requested:   MSCIDH 0.25MG/O.5ML auto-injectors. CoverMyMeds Used:  Key: [id number]   [id number]    Quantity: 9 mL   Day Supply: 90 days   Sig:  Inject 0.75 mL (2.4 mg total) into the skin once a week. DX Code:  E66.01, severe obesity     Cmm pa submitted with OV 1/2023, 8/2023   Awaiting determination.

## 2023-09-05 ENCOUNTER — OFFICE VISIT (OUTPATIENT)
Dept: OTOLARYNGOLOGY | Facility: CLINIC | Age: 58
End: 2023-09-05

## 2023-09-05 DIAGNOSIS — E21.0 PRIMARY HYPERPARATHYROIDISM (HCC): Primary | ICD-10-CM

## 2023-09-05 NOTE — TELEPHONE ENCOUNTER
Per Cover my meds Wegovy approved 8/31/2023 through 02/29/2024. [facility] [phone], spoke with Jose Santiago. He states too soon, can refill on 9/14/2023.      My chart message sent to patient of approval.

## 2023-09-05 NOTE — PROGRESS NOTES
Eric Talbert is a 65year old male. Patient presents with:  Thyroid Nodule: Pt consulting on hyperparathyroidism. ASSESSMENT AND PLAN:   1. [facility]  [facility] 60[facility] who presents in consultation from Dr. Greta Grewal regarding primary hyperparathyroidism. He has a history of kidney stones as well as a recent DEXA scan with a T score of -1.4 indicative of osteopenia. His calcium was 11.1 and parathyroid hormone was elevated at 118. His vitamin D and renal labs were normal.  His urine calcium levels were also elevated. He notes that he had his thyroid ablated about 15 years ago for hyperactive thyroid possibly Graves' disease. He is currently on Synthroid. Exam without any palpable nodularity of the thyroid bed. Suspicion for primary hyperparathyroidism in the setting of elevated calcium and PTH and a history of kidney stones and osteopenia. We will obtain a nuclear medicine uptake scan as well as a thyroid ultrasound and investigation for an adenoma. Discussed with him that the imaging will guide surgical intervention to remove the adenoma if present. Discussed the surgery in detail with him and we will follow-up with him to discuss further when he returns to review the results. - US THYROID (AZC=17360); Future  - NM PARATHYROID WITH SPECT (CPT=78071); Future      The patient indicates understanding of these issues and agrees to the plan. EXAM:   There were no vitals taken for this visit.     Pertinent exam findings may also be noted above in assessment and plan     System Details   Skin Inspection - Normal.   Constitutional Overall appearance - Normal.   Head/Face Symmetric, TMJ tenderness not present    Eyes EOMI, PERRL   Right ear:  Canal clear, TM intact, no ROLY   Left ear:  Canal clear, TM intact, no ROLY   Nose: Septum midline, inferior turbinates not enlarged, nasal valves without collapse    Oral cavity/Oropharynx: No lesions or masses on inspection or palpation, tonsils symmetric    Neck: Soft without LAD, thyroid not enlarged  Voice clear/ no stridor   Other:      Scopes and Procedures:             REVIEW OF SYSTEMS:   GENERAL HEALTH: feels well otherwise  GENERAL : denies fever, chills, sweats, weight loss, weight gain  SKIN: denies any unusual skin lesions or rashes  RESPIRATORY: denies shortness of breath with exertion  NEURO: denies headaches    Current Outpatient Medications   Medication Sig Dispense Refill    atorvastatin 10 MG Oral Tab Take 1 tablet (10 mg total) by mouth nightly. 90 tablet 0    levothyroxine 200 MCG Oral Tab Take 1 tablet (200 mcg total) by mouth before breakfast. 90 tablet 3    levothyroxine 200 MCG Oral Tab Take 1 tablet (200 mcg total) by mouth before breakfast. 90 tablet 0    semaglutide-weight management (WEGOVY) 2.4 MG/0.75ML Subcutaneous Solution Auto-injector Inject 0.75 mL (2.4 mg total) into the skin once a week. 9 mL 3    insulin lispro (HUMALOG) 100 UNIT/ML Subcutaneous Solution Inject via insulin pump. Maximum 130 units daily 120 mL 1    semaglutide-weight management (WEGOVY) 1.7 MG/0.75ML Subcutaneous Solution Auto-injector Inject 0.75 mL (1.7 mg total) into the skin once a week. 4 each 0    gabapentin 300 MG Oral Cap Take 300 mg AM and 600 mg PM 90 capsule 5    Glucose Blood (CONTOUR NEXT TEST) In Vitro Strip Test 4-5 times daily 500 each 0    Insulin Pen Needle (PEN NEEDLES) 32G X 6 MM Does not apply Misc 1 each by Does not apply route 4 (four) times daily. Use to inject insulin 4 times daily . 400 each 0    Continuous Blood Gluc Transmit (DEXCOM G6 TRANSMITTER) Does not apply Misc 1 each by Does not apply route every 3 (three) months.  1 each 1    ASCENSIA MICROFILL TEST In Vitro Strip TEST SIX TIMES DAILY 200 Strip 2      Past Medical History:   Diagnosis Date    Personal history of urinary calculi     Type I (juvenile type) diabetes mellitus without mention of complication, not stated as uncontrolled     Type II or unspecified type diabetes mellitus without mention of complication, not stated as uncontrolled     Unspecified septicemia(038.9)       Social History:  Social History     Socioeconomic History    Marital status:    Tobacco Use    Smoking status: Never    Smokeless tobacco: Never   Vaping Use    Vaping Use: Never used   Substance and Sexual Activity    Alcohol use: No    Drug use: No   Other Topics Concern    Caffeine Concern Yes     Comment: soda 16oz/day    Exercise No          India Salmon MD  9/5/2023  4:11 PM

## 2023-09-18 ENCOUNTER — HOSPITAL ENCOUNTER (OUTPATIENT)
Dept: ULTRASOUND IMAGING | Age: 58
Discharge: HOME | End: 2023-09-18
Attending: STUDENT IN AN ORGANIZED HEALTH CARE EDUCATION/TRAINING PROGRAM
Payer: COMMERCIAL

## 2023-09-18 DIAGNOSIS — E21.0 PRIMARY HYPERPARATHYROIDISM (HCC): ICD-10-CM

## 2023-09-18 PROCEDURE — 76536 US EXAM OF HEAD AND NECK: CPT | Performed by: STUDENT IN AN ORGANIZED HEALTH CARE EDUCATION/TRAINING PROGRAM

## 2023-09-21 ENCOUNTER — HOSPITAL ENCOUNTER (OUTPATIENT)
Dept: NUCLEAR MEDICINE | Facility: HOSPITAL | Age: 58
Discharge: HOME | End: 2023-09-21
Attending: STUDENT IN AN ORGANIZED HEALTH CARE EDUCATION/TRAINING PROGRAM
Payer: COMMERCIAL

## 2023-09-21 ENCOUNTER — APPOINTMENT (OUTPATIENT)
Dept: NUCLEAR MEDICINE | Facility: HOSPITAL | Age: 58
End: 2023-09-21
Attending: STUDENT IN AN ORGANIZED HEALTH CARE EDUCATION/TRAINING PROGRAM
Payer: COMMERCIAL

## 2023-09-21 DIAGNOSIS — E21.0 PRIMARY HYPERPARATHYROIDISM (HCC): ICD-10-CM

## 2023-09-21 PROCEDURE — 78072 PARATHYRD PLANAR W/SPECT&CT: CPT | Performed by: STUDENT IN AN ORGANIZED HEALTH CARE EDUCATION/TRAINING PROGRAM

## 2023-09-26 ENCOUNTER — OFFICE VISIT (OUTPATIENT)
Dept: OTOLARYNGOLOGY | Facility: CLINIC | Age: 58
End: 2023-09-26

## 2023-09-26 DIAGNOSIS — E21.0 PRIMARY HYPERPARATHYROIDISM (HCC): Primary | ICD-10-CM

## 2023-09-26 PROCEDURE — 99215 OFFICE O/P EST HI 40 MIN: CPT | Performed by: STUDENT IN AN ORGANIZED HEALTH CARE EDUCATION/TRAINING PROGRAM

## 2023-09-26 NOTE — PROGRESS NOTES
Eric Talbert is a 65year old male. Patient presents with:  Test Results: Patient here to go over test results. ASSESSMENT AND PLAN:   1. Primary hyperparathyroidism [facility])  52-year-old with concern for primary hyperparathyroidism. He has a history of kidney stones as well as a recent DEXA scan with a T score of -1.4 indicative of osteopenia. His calcium was 11.1 and parathyroid hormone was elevated at 118. His vitamin D and renal labs were normal.  His urine calcium levels were also elevated. He notes that he had his thyroid ablated about 15 years ago for hyperactive thyroid possibly Graves' disease. He is currently on Synthroid. He had a sestamibi scan recently performed that suspected a small right-sided parathyroid adenoma about 6 mm in size. He had a thyroid ultrasound that demonstrated a small right-sided nodule about 1 x 1 cm in size. This may be consistent with the adenoma on the same side. Exam without any palpable nodularity of the thyroid bed. Voice appears normal.    My interpretation of the sestamibi scan there appears to be a right-sided inferior parathyroid adenoma in the paratracheal region. Given the lab values and imaging consistent with a possible right-sided parathyroid adenoma. The history of thyroid ablation for Graves' disease complicates the situation creating more scar tissue with a more difficult dissection. He also has a history of diabetes with his last HbA1c of 7.2 in August. I educated the patient on the pathophysiology of their parathyroid disorder. Parathyroidectomy was discussed including risks, benefits and alternatives. Risks include injury to the nerves that control the vocal cords causing temporary or permanent hoarseness or respiratory distress, hypocalcemia from hypoparathyroidism, bleeding, infection, risks of general anesthesia, and need for additional procedures.  Further exploration on both sides of the neck with additional dissection and removal of parathyroid glands may be needed if the suspect gland is not located. The patient understands, all questions were answered and would like to proceed. MDM  -Major surgery with known patient risk factors  -Review of sestamibi scan, thyroid ultrasound and labs as noted  -Interpretation of imaging  -Discussion of care with endocrinologist    The patient indicates understanding of these issues and agrees to the plan. EXAM:   There were no vitals taken for this visit. Pertinent exam findings may also be noted above in assessment and plan     System Details   Skin Inspection - Normal.   Constitutional Overall appearance - Normal.   Head/Face Symmetric, TMJ tenderness not present    Eyes EOMI, PERRL   Right ear:  Canal clear, TM intact, no ROLY   Left ear:  Canal clear, TM intact, no ROLY   Nose: Septum midline, inferior turbinates not enlarged, nasal valves without collapse    Oral cavity/Oropharynx: No lesions or masses on inspection or palpation, tonsils symmetric    Neck: Soft without LAD, thyroid not enlarged  Voice clear/ no stridor   Other:      Scopes and Procedures:             Current Outpatient Medications   Medication Sig Dispense Refill    atorvastatin 10 MG Oral Tab Take 1 tablet (10 mg total) by mouth nightly. 90 tablet 0    levothyroxine 200 MCG Oral Tab Take 1 tablet (200 mcg total) by mouth before breakfast. 90 tablet 3    levothyroxine 200 MCG Oral Tab Take 1 tablet (200 mcg total) by mouth before breakfast. 90 tablet 0    semaglutide-weight management (WEGOVY) 2.4 MG/0.75ML Subcutaneous Solution Auto-injector Inject 0.75 mL (2.4 mg total) into the skin once a week. 9 mL 3    insulin lispro (HUMALOG) 100 UNIT/ML Subcutaneous Solution Inject via insulin pump. Maximum 130 units daily 120 mL 1    semaglutide-weight management (WEGOVY) 1.7 MG/0.75ML Subcutaneous Solution Auto-injector Inject 0.75 mL (1.7 mg total) into the skin once a week.  4 each 0    gabapentin 300 MG Oral Cap Take 300 mg AM and 600 mg PM 90 capsule 5    Glucose Blood (CONTOUR NEXT TEST) In Vitro Strip Test 4-5 times daily 500 each 0    Insulin Pen Needle (PEN NEEDLES) 32G X 6 MM Does not apply Misc 1 each by Does not apply route 4 (four) times daily. Use to inject insulin 4 times daily . 400 each 0    Continuous Blood Gluc Transmit (DEXCOM G6 TRANSMITTER) Does not apply Misc 1 each by Does not apply route every 3 (three) months.  1 each 1    ASCENSIA MICROFILL TEST In Vitro Strip TEST SIX TIMES DAILY 200 Strip 2      Past Medical History:   Diagnosis Date    Personal history of urinary calculi     Type I (juvenile type) diabetes mellitus without mention of complication, not stated as uncontrolled     Type II or unspecified type diabetes mellitus without mention of complication, not stated as uncontrolled     Unspecified septicemia(038.9)       Social History:  Social History     Socioeconomic History    Marital status:    Tobacco Use    Smoking status: Never    Smokeless tobacco: Never   Vaping Use    Vaping Use: Never used   Substance and Sexual Activity    Alcohol use: No    Drug use: No   Other Topics Concern    Caffeine Concern Yes     Comment: soda 16oz/day    Exercise No          Bernardino Johnson MD  9/26/2023  5:25 PM

## 2023-10-02 ENCOUNTER — TELEPHONE (OUTPATIENT)
Dept: OTOLARYNGOLOGY | Facility: CLINIC | Age: 58
End: 2023-10-02

## 2023-10-02 DIAGNOSIS — D35.1 PARATHYROID ADENOMA: Primary | ICD-10-CM

## 2023-10-06 ENCOUNTER — TELEPHONE (OUTPATIENT)
Dept: OTOLARYNGOLOGY | Facility: CLINIC | Age: 58
End: 2023-10-06

## 2023-10-06 ENCOUNTER — LAB ENCOUNTER (OUTPATIENT)
Dept: LAB | Age: 58
End: 2023-10-06
Attending: FAMILY MEDICINE
Payer: COMMERCIAL

## 2023-10-06 ENCOUNTER — EKG ENCOUNTER (OUTPATIENT)
Dept: LAB | Age: 58
End: 2023-10-06
Attending: FAMILY MEDICINE
Payer: COMMERCIAL

## 2023-10-06 ENCOUNTER — OFFICE VISIT (OUTPATIENT)
Dept: FAMILY MEDICINE CLINIC | Facility: CLINIC | Age: 58
End: 2023-10-06
Payer: COMMERCIAL

## 2023-10-06 ENCOUNTER — TELEPHONE (OUTPATIENT)
Dept: ENDOCRINOLOGY CLINIC | Facility: CLINIC | Age: 58
End: 2023-10-06

## 2023-10-06 VITALS
HEART RATE: 82 BPM | SYSTOLIC BLOOD PRESSURE: 128 MMHG | HEIGHT: 70 IN | DIASTOLIC BLOOD PRESSURE: 72 MMHG | OXYGEN SATURATION: 97 % | RESPIRATION RATE: 20 BRPM | WEIGHT: 237 LBS | BODY MASS INDEX: 33.93 KG/M2

## 2023-10-06 DIAGNOSIS — E21.3 HYPERPARATHYROIDISM (HCC): ICD-10-CM

## 2023-10-06 DIAGNOSIS — E78.5 HYPERLIPIDEMIA, UNSPECIFIED HYPERLIPIDEMIA TYPE: ICD-10-CM

## 2023-10-06 DIAGNOSIS — E03.9 HYPOTHYROIDISM, UNSPECIFIED TYPE: ICD-10-CM

## 2023-10-06 DIAGNOSIS — E10.9 TYPE 1 DIABETES MELLITUS WITHOUT COMPLICATION (HCC): ICD-10-CM

## 2023-10-06 DIAGNOSIS — Z01.818 PREOPERATIVE CLEARANCE: ICD-10-CM

## 2023-10-06 DIAGNOSIS — E21.3 HYPERPARATHYROIDISM (HCC): Primary | ICD-10-CM

## 2023-10-06 LAB
BILIRUB UR QL STRIP.AUTO: NEGATIVE
CLARITY UR REFRACT.AUTO: CLEAR
GLUCOSE UR STRIP.AUTO-MCNC: 30 MG/DL
KETONES UR STRIP.AUTO-MCNC: NEGATIVE MG/DL
LEUKOCYTE ESTERASE UR QL STRIP.AUTO: NEGATIVE
NITRITE UR QL STRIP.AUTO: NEGATIVE
PH UR STRIP.AUTO: 6.5 [PH] (ref 5–8)
PROT UR STRIP.AUTO-MCNC: NEGATIVE MG/DL
RBC UR QL AUTO: NEGATIVE
SP GR UR STRIP.AUTO: 1.02 (ref 1–1.03)
UROBILINOGEN UR STRIP.AUTO-MCNC: NORMAL MG/DL

## 2023-10-06 PROCEDURE — 81003 URINALYSIS AUTO W/O SCOPE: CPT | Performed by: FAMILY MEDICINE

## 2023-10-06 PROCEDURE — 3078F DIAST BP <80 MM HG: CPT | Performed by: FAMILY MEDICINE

## 2023-10-06 PROCEDURE — 3074F SYST BP LT 130 MM HG: CPT | Performed by: FAMILY MEDICINE

## 2023-10-06 PROCEDURE — 93010 ELECTROCARDIOGRAM REPORT: CPT | Performed by: INTERNAL MEDICINE

## 2023-10-06 PROCEDURE — 99214 OFFICE O/P EST MOD 30 MIN: CPT | Performed by: FAMILY MEDICINE

## 2023-10-06 PROCEDURE — 93005 ELECTROCARDIOGRAM TRACING: CPT

## 2023-10-06 PROCEDURE — 3008F BODY MASS INDEX DOCD: CPT | Performed by: FAMILY MEDICINE

## 2023-10-06 NOTE — PROGRESS NOTES
Subjective:   Patient ID: Hector Torres is a 65year old male. Hyperparathyroidism. Having parathyroidectomy with Dr. Orly Smith on 10/30/23. Here for preop clearance. DMII type 1. Last A1c value was 7.2% done 8/28/2023.  + hypothyroidism controlled. Stable HLD. History/Other:   Review of Systems   All other systems reviewed and are negative. Current Outpatient Medications   Medication Sig Dispense Refill    atorvastatin 10 MG Oral Tab Take 1 tablet (10 mg total) by mouth nightly. 90 tablet 0    levothyroxine 200 MCG Oral Tab Take 1 tablet (200 mcg total) by mouth before breakfast. 90 tablet 3    levothyroxine 200 MCG Oral Tab Take 1 tablet (200 mcg total) by mouth before breakfast. 90 tablet 0    semaglutide-weight management (WEGOVY) 2.4 MG/0.75ML Subcutaneous Solution Auto-injector Inject 0.75 mL (2.4 mg total) into the skin once a week. 9 mL 3    insulin lispro (HUMALOG) 100 UNIT/ML Subcutaneous Solution Inject via insulin pump. Maximum 130 units daily 120 mL 1    semaglutide-weight management (WEGOVY) 1.7 MG/0.75ML Subcutaneous Solution Auto-injector Inject 0.75 mL (1.7 mg total) into the skin once a week. 4 each 0    gabapentin 300 MG Oral Cap Take 300 mg AM and 600 mg PM 90 capsule 5    Glucose Blood (CONTOUR NEXT TEST) In Vitro Strip Test 4-5 times daily 500 each 0    Insulin Pen Needle (PEN NEEDLES) 32G X 6 MM Does not apply Misc 1 each by Does not apply route 4 (four) times daily. Use to inject insulin 4 times daily . 400 each 0    Continuous Blood Gluc Transmit (DEXCOM G6 TRANSMITTER) Does not apply Misc 1 each by Does not apply route every 3 (three) months. 1 each 1    ASCENSIA MICROFILL TEST In Vitro Strip TEST SIX TIMES DAILY 200 Strip 2     Allergies:  Statins                 MYALGIA  Bee Venom                   Objective:   Physical Exam  Vitals reviewed. Constitutional:       General: He is not in acute distress. Appearance: He is well-developed. He is not diaphoretic. HENT:      Right Ear: External ear normal.      Left Ear: External ear normal.      Nose: Nose normal.      Mouth/Throat:      Pharynx: No oropharyngeal exudate. Eyes:      General: No scleral icterus. Right eye: No discharge. Left eye: No discharge. Conjunctiva/sclera: Conjunctivae normal.      Pupils: Pupils are equal, round, and reactive to light. Neck:      Thyroid: No thyromegaly. Cardiovascular:      Rate and Rhythm: Normal rate and regular rhythm. Heart sounds: Normal heart sounds. No murmur heard. No friction rub. No gallop. Pulmonary:      Effort: Pulmonary effort is normal. No respiratory distress. Breath sounds: Normal breath sounds. No wheezing or rales. Abdominal:      General: Bowel sounds are normal. There is no distension. Palpations: Abdomen is soft. There is no mass. Tenderness: There is no abdominal tenderness. There is no guarding or rebound. Genitourinary:     Penis: No tenderness. Musculoskeletal:      Cervical back: Normal range of motion and neck supple. Lymphadenopathy:      Cervical: No cervical adenopathy. Skin:     General: Skin is warm and dry. Coloration: Skin is not pale. Findings: No erythema or rash. Neurological:      Mental Status: He is alert and oriented to person, place, and time. Cranial Nerves: No cranial nerve deficit. Motor: No abnormal muscle tone. Coordination: Coordination normal.      Deep Tendon Reflexes: Reflexes are normal and symmetric. Psychiatric:         Behavior: Behavior normal.         Thought Content:  Thought content normal.         Judgment: Judgment normal.       Assessment & Plan:   Hyperparathyroidism ([facility] [address]  (primary encounter diagnosis)  Preoperative clearance  Type 1 diabetes mellitus without complication (HCC)  Hypothyroidism, unspecified type  Hyperlipidemia, unspecified hyperlipidemia type    Cleared for surgery  Mild abnormality in EKG, but does not appear to be inferior infarct. 1. Hyperparathyroidism ([address]  - EKG 12 Lead; Future  - UA/M With Culture Reflex [E]    2. Preoperative clearance  - EKG 12 Lead; Future  - UA/M With Culture Reflex [E]    3. Type 1 diabetes mellitus without complication (HCC)  - EKG 12 Lead; Future  - UA/M With Culture Reflex [E]    4. Hypothyroidism, unspecified type  - EKG 12 Lead; Future  - UA/M With Culture Reflex [E]    5.  Hyperlipidemia, unspecified hyperlipidemia type  - EKG 12 Lead; Future  - UA/M With Culture Reflex [E]      Meds This Visit:  Requested Prescriptions      No prescriptions requested or ordered in this encounter       Imaging & Referrals:  None

## 2023-10-06 NOTE — TELEPHONE ENCOUNTER
semaglutide-weight management (WEGOVY) 2.4 MG/0.75ML Subcutaneous Solution Auto-injector, Inject 0.75 mL (2.4 mg total) into the skin once a week., Disp: 9 mL, Rfl: 0  Key:[id number]  Patient Last Name: Kathie PEREZB: 11/08/1965

## 2023-10-06 NOTE — TELEPHONE ENCOUNTER
Medication PA Requested:      semaglutide-weight management (WEGOVY) 2.4 MG/0.75ML Subcutaneous Solution Auto-injector,                                                             CoverMyMeds Used:  Key:  Quantity: 9 ml  Day Supply: 90 days   Sig: Inject 0.75 mL (2.4 mg total) into the skin once a week.    DX Code:  E10.65                                    CPT code (if applicable):   Case Number/Pending Ref#:

## 2023-10-06 NOTE — TELEPHONE ENCOUNTER
Medication PA Requested:      semaglutide-weight management (WEGOVY) 2.4 MG/0.75ML Subcutaneous Solution Auto-injector,                                                             CoverMyMeds Used:  Key:  Quantity: 9 ml  Day Supply: 90 days   Sig: Inject 0.75 mL (2.4 mg total) into the skin once a week.    DX Code:  E10.65                                    CPT code (if applicable):   Case Number/Pending Ref#:     ePA submitted with LOV and A1c 8/28/23  Awaiting determination

## 2023-10-07 LAB
ATRIAL RATE: 61 BPM
P AXIS: 41 DEGREES
P-R INTERVAL: 154 MS
Q-T INTERVAL: 406 MS
QRS DURATION: 88 MS
QTC CALCULATION (BEZET): 408 MS
R AXIS: -2 DEGREES
T AXIS: 18 DEGREES
VENTRICULAR RATE: 61 BPM

## 2023-10-10 ENCOUNTER — TELEPHONE (OUTPATIENT)
Dept: ENDOCRINOLOGY CLINIC | Facility: CLINIC | Age: 58
End: 2023-10-10

## 2023-10-10 ENCOUNTER — PATIENT MESSAGE (OUTPATIENT)
Dept: ENDOCRINOLOGY CLINIC | Facility: CLINIC | Age: 58
End: 2023-10-10

## 2023-10-10 DIAGNOSIS — E66.01 CLASS 2 SEVERE OBESITY DUE TO EXCESS CALORIES WITH SERIOUS COMORBIDITY AND BODY MASS INDEX (BMI) OF 37.0 TO 37.9 IN ADULT: ICD-10-CM

## 2023-10-10 NOTE — TELEPHONE ENCOUNTER
ePA response states closed, called [facility] at [phone], spoke with Danielle Atwood, states RX needs a PA via [employer], ID number [id number]. Able to initiate PA via CM, attached [address] [place] [address] 8/28/23 and A1c 8/28, awaiting determination.

## 2023-10-10 NOTE — TELEPHONE ENCOUNTER
Prior Authorization:   semaglutide-weight management (WEGOVY) 2.4 MG/0.75ML Subcutaneous Solution Auto-injector, Inject 0.75 mL (2.4 mg total) into the skin once a week., Disp: 12 mL, Rfl: 0

## 2023-10-10 NOTE — TELEPHONE ENCOUNTER
EPA submitted via [employer]:    Case Id  [id number]  Reference Id  [id number]  Notes  View

## 2023-10-11 NOTE — TELEPHONE ENCOUNTER
Received a fax from [employer] in regards to the [facility]FORT MIGUEL Inj 2.4 MG, it has been approved from 08/31/2023 to 02/29/2024. [id number].  Fax sent to scanning

## 2023-10-11 NOTE — TELEPHONE ENCOUNTER
Received a fax from [facility] attached with prescription form. Form was placed in MD's folder for review and signature. Form was signed and faxed over to [facility] at [phone]. Received Confirmation: Success.  Fax sent to scanning

## 2023-10-11 NOTE — TELEPHONE ENCOUNTER
Called [facility], representative stated they received order form for infusion set without physicians signature. Requested if [facility] can refax order form for Dr. Alon Naranjo can sign provided office fax number. Awaiting form.

## 2023-10-16 ENCOUNTER — PATIENT MESSAGE (OUTPATIENT)
Dept: OTOLARYNGOLOGY | Facility: CLINIC | Age: 58
End: 2023-10-16

## 2023-10-18 RX ORDER — SEMAGLUTIDE 2.4 MG/.75ML
2.4 INJECTION, SOLUTION SUBCUTANEOUS WEEKLY
Qty: 9 ML | Refills: 3 | Status: SHIPPED | OUTPATIENT
Start: 2023-10-18

## 2023-10-29 ENCOUNTER — ANESTHESIA EVENT (OUTPATIENT)
Dept: SURGERY | Facility: HOSPITAL | Age: 58
End: 2023-10-29
Payer: COMMERCIAL

## 2023-10-30 ENCOUNTER — ANESTHESIA (OUTPATIENT)
Dept: SURGERY | Facility: HOSPITAL | Age: 58
End: 2023-10-30
Payer: COMMERCIAL

## 2023-10-30 ENCOUNTER — HOSPITAL ENCOUNTER (OUTPATIENT)
Facility: HOSPITAL | Age: 58
Discharge: HOME | End: 2023-10-31
Attending: STUDENT IN AN ORGANIZED HEALTH CARE EDUCATION/TRAINING PROGRAM | Admitting: STUDENT IN AN ORGANIZED HEALTH CARE EDUCATION/TRAINING PROGRAM
Payer: COMMERCIAL

## 2023-10-30 DIAGNOSIS — D35.1 PARATHYROID ADENOMA: ICD-10-CM

## 2023-10-30 LAB
CALCIUM BLD-MCNC: 9.6 MG/DL (ref 8.5–10.1)
GLUCOSE BLDC GLUCOMTR-MCNC: 245 MG/DL (ref 70–99)
GLUCOSE BLDC GLUCOMTR-MCNC: 294 MG/DL (ref 70–99)
GLUCOSE BLDC GLUCOMTR-MCNC: 304 MG/DL (ref 70–99)
GLUCOSE BLDC GLUCOMTR-MCNC: 305 MG/DL (ref 70–99)
GLUCOSE BLDC GLUCOMTR-MCNC: 419 MG/DL (ref 70–99)
GLUCOSE BLDC GLUCOMTR-MCNC: 436 MG/DL (ref 70–99)
PTH-INTACT SERPL-MCNC: 203.6 PG/ML
PTH-INTACT SERPL-MCNC: 32.2 PG/ML
PTH-INTACT SERPL-MCNC: 43.6 PG/ML
PTH-INTACT SERPL-MCNC: 71.2 PG/ML

## 2023-10-30 PROCEDURE — 99204 OFFICE O/P NEW MOD 45 MIN: CPT | Performed by: HOSPITALIST

## 2023-10-30 PROCEDURE — 99222 1ST HOSP IP/OBS MODERATE 55: CPT | Performed by: INTERNAL MEDICINE

## 2023-10-30 PROCEDURE — 60220 PARTIAL REMOVAL OF THYROID: CPT | Performed by: OTOLARYNGOLOGY

## 2023-10-30 PROCEDURE — 60220 PARTIAL REMOVAL OF THYROID: CPT | Performed by: STUDENT IN AN ORGANIZED HEALTH CARE EDUCATION/TRAINING PROGRAM

## 2023-10-30 PROCEDURE — 0GBL0ZZ EXCISION OF RIGHT SUPERIOR PARATHYROID GLAND, OPEN APPROACH: ICD-10-PCS | Performed by: STUDENT IN AN ORGANIZED HEALTH CARE EDUCATION/TRAINING PROGRAM

## 2023-10-30 RX ORDER — GLYCOPYRROLATE 0.2 MG/ML
INJECTION, SOLUTION INTRAMUSCULAR; INTRAVENOUS AS NEEDED
Status: DISCONTINUED | OUTPATIENT
Start: 2023-10-30 | End: 2023-10-30 | Stop reason: SURG

## 2023-10-30 RX ORDER — INSULIN ASPART 100 [IU]/ML
5 INJECTION, SOLUTION INTRAVENOUS; SUBCUTANEOUS ONCE
Status: COMPLETED | OUTPATIENT
Start: 2023-10-30 | End: 2023-10-30

## 2023-10-30 RX ORDER — SENNOSIDES 8.6 MG
17.2 TABLET ORAL NIGHTLY PRN
Status: DISCONTINUED | OUTPATIENT
Start: 2023-10-30 | End: 2023-10-31

## 2023-10-30 RX ORDER — SODIUM CHLORIDE, SODIUM LACTATE, POTASSIUM CHLORIDE, CALCIUM CHLORIDE 600; 310; 30; 20 MG/100ML; MG/100ML; MG/100ML; MG/100ML
INJECTION, SOLUTION INTRAVENOUS CONTINUOUS
Status: DISCONTINUED | OUTPATIENT
Start: 2023-10-30 | End: 2023-10-30 | Stop reason: HOSPADM

## 2023-10-30 RX ORDER — NICOTINE POLACRILEX 4 MG
15 LOZENGE BUCCAL
Status: DISCONTINUED | OUTPATIENT
Start: 2023-10-30 | End: 2023-10-30 | Stop reason: HOSPADM

## 2023-10-30 RX ORDER — NICOTINE POLACRILEX 4 MG
30 LOZENGE BUCCAL
Status: DISCONTINUED | OUTPATIENT
Start: 2023-10-30 | End: 2023-10-30 | Stop reason: HOSPADM

## 2023-10-30 RX ORDER — ONDANSETRON 2 MG/ML
4 INJECTION INTRAMUSCULAR; INTRAVENOUS EVERY 6 HOURS PRN
Status: DISCONTINUED | OUTPATIENT
Start: 2023-10-30 | End: 2023-10-31

## 2023-10-30 RX ORDER — LEVOTHYROXINE SODIUM 0.1 MG/1
200 TABLET ORAL
Status: DISCONTINUED | OUTPATIENT
Start: 2023-10-31 | End: 2023-10-31

## 2023-10-30 RX ORDER — OXYCODONE HYDROCHLORIDE 5 MG/1
5 TABLET ORAL EVERY 4 HOURS PRN
Status: DISCONTINUED | OUTPATIENT
Start: 2023-10-30 | End: 2023-10-31

## 2023-10-30 RX ORDER — CALCITRIOL 0.25 UG/1
0.25 CAPSULE, LIQUID FILLED ORAL DAILY
Status: DISCONTINUED | OUTPATIENT
Start: 2023-10-30 | End: 2023-10-31

## 2023-10-30 RX ORDER — FAMOTIDINE 20 MG/1
20 TABLET, FILM COATED ORAL ONCE
Status: COMPLETED | OUTPATIENT
Start: 2023-10-30 | End: 2023-10-30

## 2023-10-30 RX ORDER — CEFAZOLIN SODIUM/WATER 2 G/20 ML
SYRINGE (ML) INTRAVENOUS AS NEEDED
Status: DISCONTINUED | OUTPATIENT
Start: 2023-10-30 | End: 2023-10-30 | Stop reason: SURG

## 2023-10-30 RX ORDER — NICOTINE POLACRILEX 4 MG
30 LOZENGE BUCCAL
Status: DISCONTINUED | OUTPATIENT
Start: 2023-10-30 | End: 2023-10-31

## 2023-10-30 RX ORDER — LIDOCAINE HYDROCHLORIDE 10 MG/ML
INJECTION, SOLUTION EPIDURAL; INFILTRATION; INTRACAUDAL; PERINEURAL AS NEEDED
Status: DISCONTINUED | OUTPATIENT
Start: 2023-10-30 | End: 2023-10-30 | Stop reason: SURG

## 2023-10-30 RX ORDER — SODIUM CHLORIDE 0.9 % (FLUSH) 0.9 %
10 SYRINGE (ML) INJECTION AS NEEDED
Status: DISCONTINUED | OUTPATIENT
Start: 2023-10-30 | End: 2023-10-31

## 2023-10-30 RX ORDER — SODIUM CHLORIDE, SODIUM LACTATE, POTASSIUM CHLORIDE, CALCIUM CHLORIDE 600; 310; 30; 20 MG/100ML; MG/100ML; MG/100ML; MG/100ML
INJECTION, SOLUTION INTRAVENOUS CONTINUOUS
Status: DISCONTINUED | OUTPATIENT
Start: 2023-10-30 | End: 2023-10-31

## 2023-10-30 RX ORDER — LIDOCAINE HYDROCHLORIDE AND EPINEPHRINE 10; 10 MG/ML; UG/ML
INJECTION, SOLUTION INFILTRATION; PERINEURAL AS NEEDED
Status: DISCONTINUED | OUTPATIENT
Start: 2023-10-30 | End: 2023-10-30 | Stop reason: HOSPADM

## 2023-10-30 RX ORDER — POLYETHYLENE GLYCOL 3350 17 G/17G
17 POWDER, FOR SOLUTION ORAL DAILY PRN
Status: DISCONTINUED | OUTPATIENT
Start: 2023-10-30 | End: 2023-10-31

## 2023-10-30 RX ORDER — HYDROMORPHONE HYDROCHLORIDE 1 MG/ML
0.6 INJECTION, SOLUTION INTRAMUSCULAR; INTRAVENOUS; SUBCUTANEOUS EVERY 5 MIN PRN
Status: DISCONTINUED | OUTPATIENT
Start: 2023-10-30 | End: 2023-10-30 | Stop reason: HOSPADM

## 2023-10-30 RX ORDER — NALOXONE HYDROCHLORIDE 0.4 MG/ML
0.08 INJECTION, SOLUTION INTRAMUSCULAR; INTRAVENOUS; SUBCUTANEOUS AS NEEDED
Status: DISCONTINUED | OUTPATIENT
Start: 2023-10-30 | End: 2023-10-30 | Stop reason: HOSPADM

## 2023-10-30 RX ORDER — ROCURONIUM BROMIDE 10 MG/ML
INJECTION, SOLUTION INTRAVENOUS AS NEEDED
Status: DISCONTINUED | OUTPATIENT
Start: 2023-10-30 | End: 2023-10-30 | Stop reason: SURG

## 2023-10-30 RX ORDER — MORPHINE SULFATE 4 MG/ML
4 INJECTION, SOLUTION INTRAMUSCULAR; INTRAVENOUS EVERY 10 MIN PRN
Status: DISCONTINUED | OUTPATIENT
Start: 2023-10-30 | End: 2023-10-30 | Stop reason: HOSPADM

## 2023-10-30 RX ORDER — DEXTROSE MONOHYDRATE 25 G/50ML
50 INJECTION, SOLUTION INTRAVENOUS
Status: DISCONTINUED | OUTPATIENT
Start: 2023-10-30 | End: 2023-10-31

## 2023-10-30 RX ORDER — BISACODYL 10 MG
10 SUPPOSITORY, RECTAL RECTAL
Status: DISCONTINUED | OUTPATIENT
Start: 2023-10-30 | End: 2023-10-31

## 2023-10-30 RX ORDER — ONDANSETRON 2 MG/ML
INJECTION INTRAMUSCULAR; INTRAVENOUS AS NEEDED
Status: DISCONTINUED | OUTPATIENT
Start: 2023-10-30 | End: 2023-10-30 | Stop reason: SURG

## 2023-10-30 RX ORDER — HYDROMORPHONE HYDROCHLORIDE 1 MG/ML
0.4 INJECTION, SOLUTION INTRAMUSCULAR; INTRAVENOUS; SUBCUTANEOUS EVERY 5 MIN PRN
Status: DISCONTINUED | OUTPATIENT
Start: 2023-10-30 | End: 2023-10-30 | Stop reason: HOSPADM

## 2023-10-30 RX ORDER — DEXTROSE MONOHYDRATE 25 G/50ML
50 INJECTION, SOLUTION INTRAVENOUS
Status: DISCONTINUED | OUTPATIENT
Start: 2023-10-30 | End: 2023-10-30 | Stop reason: HOSPADM

## 2023-10-30 RX ORDER — ACETAMINOPHEN 500 MG
1000 TABLET ORAL ONCE
Status: COMPLETED | OUTPATIENT
Start: 2023-10-30 | End: 2023-10-30

## 2023-10-30 RX ORDER — ACETAMINOPHEN 500 MG
1000 TABLET ORAL EVERY 8 HOURS PRN
Status: DISCONTINUED | OUTPATIENT
Start: 2023-10-30 | End: 2023-10-31

## 2023-10-30 RX ORDER — MORPHINE SULFATE 4 MG/ML
2 INJECTION, SOLUTION INTRAMUSCULAR; INTRAVENOUS EVERY 10 MIN PRN
Status: DISCONTINUED | OUTPATIENT
Start: 2023-10-30 | End: 2023-10-30 | Stop reason: HOSPADM

## 2023-10-30 RX ORDER — MIDAZOLAM HYDROCHLORIDE 1 MG/ML
INJECTION INTRAMUSCULAR; INTRAVENOUS AS NEEDED
Status: DISCONTINUED | OUTPATIENT
Start: 2023-10-30 | End: 2023-10-30 | Stop reason: SURG

## 2023-10-30 RX ORDER — HYDROMORPHONE HYDROCHLORIDE 1 MG/ML
0.2 INJECTION, SOLUTION INTRAMUSCULAR; INTRAVENOUS; SUBCUTANEOUS EVERY 5 MIN PRN
Status: DISCONTINUED | OUTPATIENT
Start: 2023-10-30 | End: 2023-10-30 | Stop reason: HOSPADM

## 2023-10-30 RX ORDER — NICOTINE POLACRILEX 4 MG
15 LOZENGE BUCCAL
Status: DISCONTINUED | OUTPATIENT
Start: 2023-10-30 | End: 2023-10-31

## 2023-10-30 RX ORDER — METOCLOPRAMIDE 10 MG/1
10 TABLET ORAL ONCE
Status: COMPLETED | OUTPATIENT
Start: 2023-10-30 | End: 2023-10-30

## 2023-10-30 RX ORDER — NEOSTIGMINE METHYLSULFATE 1 MG/ML
INJECTION, SOLUTION INTRAVENOUS AS NEEDED
Status: DISCONTINUED | OUTPATIENT
Start: 2023-10-30 | End: 2023-10-30 | Stop reason: SURG

## 2023-10-30 RX ORDER — MORPHINE SULFATE 10 MG/ML
6 INJECTION, SOLUTION INTRAMUSCULAR; INTRAVENOUS EVERY 10 MIN PRN
Status: DISCONTINUED | OUTPATIENT
Start: 2023-10-30 | End: 2023-10-30 | Stop reason: HOSPADM

## 2023-10-30 RX ORDER — CALCIUM CARBONATE 500(1250)
1000 TABLET ORAL
Status: DISCONTINUED | OUTPATIENT
Start: 2023-10-30 | End: 2023-10-31

## 2023-10-30 RX ORDER — PROCHLORPERAZINE EDISYLATE 5 MG/ML
5 INJECTION INTRAMUSCULAR; INTRAVENOUS EVERY 8 HOURS PRN
Status: DISCONTINUED | OUTPATIENT
Start: 2023-10-30 | End: 2023-10-30 | Stop reason: HOSPADM

## 2023-10-30 RX ORDER — ONDANSETRON 2 MG/ML
4 INJECTION INTRAMUSCULAR; INTRAVENOUS EVERY 6 HOURS PRN
Status: DISCONTINUED | OUTPATIENT
Start: 2023-10-30 | End: 2023-10-30 | Stop reason: HOSPADM

## 2023-10-30 RX ADMIN — ROCURONIUM BROMIDE 20 MG: 10 INJECTION, SOLUTION INTRAVENOUS at 09:40:00

## 2023-10-30 RX ADMIN — LIDOCAINE HYDROCHLORIDE 50 MG: 10 INJECTION, SOLUTION EPIDURAL; INFILTRATION; INTRACAUDAL; PERINEURAL at 09:17:00

## 2023-10-30 RX ADMIN — ROCURONIUM BROMIDE 30 MG: 10 INJECTION, SOLUTION INTRAVENOUS at 10:04:00

## 2023-10-30 RX ADMIN — MIDAZOLAM HYDROCHLORIDE 2 MG: 1 INJECTION INTRAMUSCULAR; INTRAVENOUS at 09:13:00

## 2023-10-30 RX ADMIN — GLYCOPYRROLATE 0.6 MG: 0.2 INJECTION, SOLUTION INTRAMUSCULAR; INTRAVENOUS at 10:23:00

## 2023-10-30 RX ADMIN — NEOSTIGMINE METHYLSULFATE 3 MG: 1 INJECTION, SOLUTION INTRAVENOUS at 10:23:00

## 2023-10-30 RX ADMIN — SODIUM CHLORIDE, SODIUM LACTATE, POTASSIUM CHLORIDE, CALCIUM CHLORIDE: 600; 310; 30; 20 INJECTION, SOLUTION INTRAVENOUS at 09:13:00

## 2023-10-30 RX ADMIN — ONDANSETRON 4 MG: 2 INJECTION INTRAMUSCULAR; INTRAVENOUS at 10:14:00

## 2023-10-30 RX ADMIN — ROCURONIUM BROMIDE 50 MG: 10 INJECTION, SOLUTION INTRAVENOUS at 09:17:00

## 2023-10-30 RX ADMIN — CEFAZOLIN SODIUM/WATER 2 G: 2 G/20 ML SYRINGE (ML) INTRAVENOUS at 09:12:00

## 2023-10-30 NOTE — ANESTHESIA POSTPROCEDURE EVALUATION
Patient: Magali Mojica [facility]    Procedure Summary       Date: 10/30/23 Room / Location: [facility] OR 17 / [facility] MAIN OR    Anesthesia Start: 9470 Anesthesia Stop:     Procedure: Parathyroidectomy (Neck) Diagnosis:       Parathyroid adenoma      (Parathyroid adenoma [D35.1])    Surgeons: Lidia Rivers MD Anesthesiologist: Nicanor yS MD    Anesthesia Type: general ASA Status: 3            Anesthesia Type: general    Vitals Value Taken Time   /85 10/30/23 1034   Temp 98.4 10/30/23 1041   Pulse 76 10/30/23 1039   Resp 22 10/30/23 1039   SpO2 98 % 10/30/23 1039   Vitals shown include unfiled device data.     [facility] Post Evaluation:   Patient Evaluated in PACU  Patient Participation: complete - patient participated  Level of Consciousness: awake  Pain Score: 0  Pain Management: adequate  Airway Patency:patent  Dental exam unchanged from preop  Yes    Cardiovascular Status: hemodynamically stable  Respiratory Status: nasal cannula  Postoperative Hydration balanced      Boy Edwards MD  10/30/2023 10:41 AM

## 2023-10-30 NOTE — OPERATIVE REPORT
DATE OF SURGERY: 10/30/23  PREOPERATIVE DIAGNOSIS:   Primary Hyperparathyroidism   Parathyroid adenoma  POSTOPERATIVE DIAGNOSIS: Same. OPERATIVE PROCEDURE:  Parathyroidectomy    SURGEON: Kal Guajardo M.D.  (Otolaryngology)  Assistant: Dr. Luis Angel Holt MD    ANESTHESIA:  GENERAL. Findings:   Baseline PTH  203  Post 5 min 72  Pathology with hypercellular parathyroid tissue  Right RLN in close proximity to the adenoma and preserved      PROCEDURE: Patient was taken to the operating room and placed supine on the operating table. They underwent an uneventful intubation. Patient was positioned and draped in usual fashion with a shoulder roll. Incision was marked out and injected with local anesthetic. Area was prepped. Timeout was performed all parties were in agreement. Incision made with 15 blade and pre-existing neck crease. Dissected down through the subcutaneous tissue and platysma. The strap musculature was divided at its midline. The strap musculature was retracted laterally on the right side to expose the parathyroid and thyroid wound bed. There was fibrosis present from previous CESAR treatment. The thyroid lobe on this right side was atrophied. Upon exploration of this wound bed there was an obvious adenoma. Is about 2 x 1.5 cm in size. It was in close proximity to the recurrent laryngeal nerve on this right side. The nerve was carefully dissected away from the adenoma and preserved. The adenoma was removed and sent for specimen. The frozen pathology returned as hypercellular parathyroid tissue. The baseline PTH returned as 203. The post excision 5-minute PTH returned at 67. Decision was made to close the incision. Strap muscular closed with chromic as well as the deep dermal suture layer. The skin was closed with Dermabond area was cleaned and the patient was handed over to anesthesia. The patient was breathing stable and transported to PACU in stable condition.    Estimated blood loss: 5cc    Implants or drains: None  Specimen: Right superior parathyroid adenoma    Urine output: Per anesthesia

## 2023-10-30 NOTE — ANESTHESIA PROCEDURE NOTES
Airway  Date/Time: 10/30/2023 9:19 AM  Urgency: elective    Airway not difficult    General Information and Staff    Patient location during procedure: OR  Anesthesiologist: Juan Guzman MD  Performed: anesthesiologist   Performed by: Juan Guzman MD  Authorized by:  Juan Guzman MD      Indications and Patient Condition  Indications for airway management: anesthesia  Spontaneous Ventilation: absent  Preoxygenated: yes  Patient position: sniffing  Mask difficulty assessment: 1 - vent by mask    Final Airway Details  Final airway type: endotracheal airway      Successful airway: ETT  Cuffed: yes   Successful intubation technique: Video laryngoscopy  Endotracheal tube insertion site: oral  Blade size: #4  ETT size (mm): 7.5    Cormack-Lehane Classification: grade I - full view of glottis  Placement verified by: capnometry   Inital cuff pressure (cm H2O): 20  Measured from: lips  ETT to lips (cm): 23  Number of attempts at approach: 1  Number of other approaches attempted: 0

## 2023-10-30 NOTE — BRIEF OP NOTE
Pre-Operative Diagnosis: Parathyroid adenoma [D35.1]     Post-Operative Diagnosis: Parathyroid adenoma [D35.1]      Procedure Performed:   Parathyroidectomy    Surgeon(s) and Role:     Jairo Mao MD - Primary     * Leonarda Cole MD - Assisting Surgeon    Assistant(s):        Surgical Findings: Baseline PTH  203  Post 5 min 72  Pathology with hypercellular parathyroid tissue     Specimen: right superior parathyroid adenoma     Estimated Blood Loss: Blood Output: 5 mL (10/30/2023 10:16 AM)      Dictation Number:      Yomi Malloy MD  10/30/2023  10:27 AM

## 2023-10-30 NOTE — CONSULTS
[facility]    PATIENT'S NAME: Judith Gold   ATTENDING PHYSICIAN: Dale Cali MD   CONSULTING PHYSICIAN: Zackary Uriarte MD   PATIENT ACCOUNT#:   [de-identified]    LOCATION:  [facility] PACU [address] [place] [address]  MEDICAL RECORD #:   [id number]       YOB: 1965  ADMISSION DATE:       10/30/2023      CONSULT DATE:  10/30/2023    REPORT OF CONSULTATION      REASON FOR ADMISSION:  Hyperparathyroidism, for parathyroidectomy. HISTORY OF PRESENT ILLNESS:  The patient is a 31-year-old  male with history of elevated calcium level and recurrent kidney stones. He was evaluated by ENT and had recent workup confirming parathyroid adenoma including nuclear medicine study showing 6 mm right-sided parathyroid adenoma. The patient was scheduled today for parathyroidectomy. Postprocedure he was brought into PACU for further monitoring. PAST MEDICAL HISTORY:  Recent diagnosis of parathyroid adenoma with hyperparathyroidism with osteopenia and hypercalcemia with kidney stones. Also has history of hypothyroidism, diabetes mellitus type 1 insulin pump dependent. PAST SURGICAL HISTORY:  Appendectomy, cystoscopy and lithotripsy, bilateral knee arthroscopic procedures. MEDICATIONS:  Please see medication reconciliation list.    ALLERGIES:  Bee venom. SOCIAL HISTORY:  No tobacco, alcohol, or drug use. Lives with his family. Independent in his basic activities of daily living. FAMILY HISTORY:  Mother had hypothyroidism. REVIEW OF SYSTEMS:  Currently resting in bed. He had some neck discomfort but no chest pain, no shortness of breath. Other 12-point review of systems negative. PHYSICAL EXAMINATION:    GENERAL:  Alert, orient to time, place, and person, no acute distress. VITAL SIGNS:  Temperature 97.8, pulse 65, respiratory rate 15, blood pressure 147/70, pulse ox 94% on room air. HEENT:  Atraumatic. Oropharynx clear. Moist mucous membranes.   Ears, nose normal.  Eyes:  Anicteric sclerae. NECK:  Supple. No lymphadenopathy. Trachea midline. Full range of motion. Anterior neck Dermabond. LUNGS:  Clear to auscultation bilaterally. Normal respiratory effort. HEART:  Regular rate and rhythm. S1 and S2 auscultated. No murmur. ABDOMEN:  Soft, nondistended. No tenderness. Positive bowel sounds. EXTREMITIES:  No peripheral edema, clubbing, or cyanosis. ASSESSMENT AND PLAN:    1. Hypercalcemia and hyperparathyroidism secondary to parathyroid adenoma, status post parathyroidectomy. Pain control. Monitor calcium level. DVT prophylaxis. 2.   Diabetes mellitus type 1, insulin pump dependent. Endocrinology consult was notified for further management. 3.   Hypothyroidism. Continue home medications.     Dictated By Jyothi Meyers MD  d: 10/30/2023 11:10:36  t: 10/30/2023 11:47:19  Job [id number]  FB/    cc: Radha Webster MD

## 2023-10-30 NOTE — ANESTHESIA PROCEDURE NOTES
Peripheral IV  Date/Time: 10/30/2023 9:30 AM  Inserted by:  Deborah Rodriguez MD    Placement  Needle size: 18 G  Laterality: right  Location: arm  Local anesthetic: none  Site prep: alcohol  Technique: anatomical landmarks  Attempts: 1

## 2023-10-31 ENCOUNTER — TELEPHONE (OUTPATIENT)
Dept: OTOLARYNGOLOGY | Facility: CLINIC | Age: 58
End: 2023-10-31

## 2023-10-31 VITALS
DIASTOLIC BLOOD PRESSURE: 81 MMHG | HEIGHT: 70 IN | HEART RATE: 70 BPM | RESPIRATION RATE: 16 BRPM | OXYGEN SATURATION: 95 % | WEIGHT: 237 LBS | TEMPERATURE: 98 F | SYSTOLIC BLOOD PRESSURE: 153 MMHG | BODY MASS INDEX: 33.93 KG/M2

## 2023-10-31 LAB
CALCIUM BLD-MCNC: 8.9 MG/DL (ref 8.5–10.1)
GLUCOSE BLDC GLUCOMTR-MCNC: 189 MG/DL (ref 70–99)
GLUCOSE BLDC GLUCOMTR-MCNC: 225 MG/DL (ref 70–99)
GLUCOSE BLDC GLUCOMTR-MCNC: 274 MG/DL (ref 70–99)

## 2023-10-31 PROCEDURE — 99214 OFFICE O/P EST MOD 30 MIN: CPT | Performed by: HOSPITALIST

## 2023-10-31 PROCEDURE — 99232 SBSQ HOSP IP/OBS MODERATE 35: CPT | Performed by: INTERNAL MEDICINE

## 2023-10-31 RX ORDER — HYDROCODONE BITARTRATE AND ACETAMINOPHEN 5; 325 MG/1; MG/1
1 TABLET ORAL EVERY 6 HOURS PRN
Qty: 12 TABLET | Refills: 0 | Status: SHIPPED | OUTPATIENT
Start: 2023-10-31 | End: 2023-11-01

## 2023-10-31 RX ORDER — CALCIUM CARBONATE 500(1250)
TABLET ORAL
Qty: 84 TABLET | Refills: 0 | Status: SHIPPED | OUTPATIENT
Start: 2023-10-31 | End: 2023-11-21

## 2023-10-31 RX ORDER — CALCITRIOL 0.25 UG/1
0.25 CAPSULE, LIQUID FILLED ORAL DAILY
Qty: 30 CAPSULE | Refills: 0 | Status: SHIPPED | OUTPATIENT
Start: 2023-11-01

## 2023-10-31 NOTE — TELEPHONE ENCOUNTER
la papers f  Sent to [facility]. 10/31/23 wants to return to work on nov. [phone] it will be 2 weeks

## 2023-10-31 NOTE — TELEPHONE ENCOUNTER
Post op day #1 parathyroidectomy    Contacted patient and feeling well since discharge from overnight stay in hospital, eating softer foods and drinking well, requesting pain medication and lortab pended and sent to dr Keeley Barroso, dressing clean and dry, instructed to keep clean and dry and no shower until cleared by md,  Instructed to call for any unusual drainage, redness, swelling or edema, fever over 101.0 F, post op appointment made by patient. Reinforced  to Resume Insulin Pump tomorrow 11/1, Use pen injectors today, 10/31 for meals,Keep Incision Clean and dry,Do not apply ointments or creams, to take calcium supplements and vitamin D as prescribed-Glucose control per primary, likely perioperative stress component-Lortab for pain as needed  -Keep incision clean and dry. Future Appointments   Date Time Provider Antonina Padilla   11/7/2023  8:30 AM MD Bonnie Brooks [facility]     Dr. Zhao Escudero please sign if agreeable.  Thank you

## 2023-10-31 NOTE — TELEPHONE ENCOUNTER
Patient called and prescription for lortab sent to pharmacy per dr Marimar Saenz, patient will pick  up for pain control once ready

## 2023-10-31 NOTE — PROGRESS NOTES
Postop day 1 status post parathyroidectomy for primary hyperparathyroidism. Doing well. Voice normal and pain controlled. Calcium normalized to 8.9 overnight  Intraoperative PTH dropped to 43 from 203  Glucose has been high    Exam with soft neck.   Incision healing well    Recommendations  -Okay for discharge this morning with follow-up next week  -Discharged with calcium supplements and vitamin D as prescribed  -Glucose control per primary, likely perioperative stress component  -Lortab for pain as needed  -Keep incision clean and dry

## 2023-11-02 ENCOUNTER — PATIENT MESSAGE (OUTPATIENT)
Dept: OTOLARYNGOLOGY | Facility: CLINIC | Age: 58
End: 2023-11-02

## 2023-11-02 RX ORDER — HYDROCODONE BITARTRATE AND ACETAMINOPHEN 5; 325 MG/1; MG/1
1 TABLET ORAL EVERY 6 HOURS PRN
Qty: 12 TABLET | Refills: 0 | Status: SHIPPED | OUTPATIENT
Start: 2023-11-02 | End: 2023-11-06

## 2023-11-02 NOTE — TELEPHONE ENCOUNTER
Fmla received in forms dept. Logged for processing. Sent [employer] message for missing auth. Pt aware how to respond. Type of Leave: Continuous  Reason for Leave: sx  Start date of leave: 10/30/23  How much time needed?: RTW: 2 wks post op  Forms Due Date:  Was Fee and Turnaround info Given?: yes. Pt questioned why so long. Explained to pt our policy/procedure. All questions answered.

## 2023-11-02 NOTE — TELEPHONE ENCOUNTER
Advised pt per TE 10/31/23 [facility] paper work faxed to forms department. Pt given information for forms through [employer]

## 2023-11-02 NOTE — TELEPHONE ENCOUNTER
This refill request is being sent to the provider for the following reason:  []Patient has not had an appointment within the past 12 months but has made an appointment on: ___  [x]Medication is not within protocol  []Patient did not complete follow up recommendations  []Other: ___    Dr. Louie Vail, please sign if agreeable, thank you

## 2023-11-03 NOTE — TELEPHONE ENCOUNTER
Pt calling states medication was not received at pharmacy please advise       HYDROcodone-acetaminophen 5-325 MG Oral Tab

## 2023-11-05 ENCOUNTER — PATIENT MESSAGE (OUTPATIENT)
Dept: OTOLARYNGOLOGY | Facility: CLINIC | Age: 58
End: 2023-11-05

## 2023-11-06 ENCOUNTER — TELEPHONE (OUTPATIENT)
Dept: OTOLARYNGOLOGY | Facility: CLINIC | Age: 58
End: 2023-11-06

## 2023-11-06 RX ORDER — HYDROCODONE BITARTRATE AND ACETAMINOPHEN 5; 325 MG/1; MG/1
1 TABLET ORAL EVERY 6 HOURS PRN
Qty: 12 TABLET | Refills: 0 | Status: SHIPPED | OUTPATIENT
Start: 2023-11-06

## 2023-11-06 NOTE — TELEPHONE ENCOUNTER
Pharmacist calling requesting the diagnostic for   Prescription in order to proceed with the prescription  HYDROcodone-acetaminophen 5-325 MG Oral Tab

## 2023-11-06 NOTE — TELEPHONE ENCOUNTER
Dr. Francisco Rodriges, last order refill was a printed script,  Please send electronically to [address] [place] [address], thank you

## 2023-11-06 NOTE — TELEPHONE ENCOUNTER
Contacted patient and confirmed that prescription for hydrocodone-acetaminophen was sent electronically to [facility] [place] today.  Patient will  today

## 2023-11-06 NOTE — TELEPHONE ENCOUNTER
From: Delonte Emerson  To: Clark Tillman  Sent: 11/5/2023 6:31 PM CST  Subject: Refil    When I go to [facility] they keep telling me there is no prescription at their store can I please have someone send it to them again thank you

## 2023-11-07 ENCOUNTER — OFFICE VISIT (OUTPATIENT)
Dept: OTOLARYNGOLOGY | Facility: CLINIC | Age: 58
End: 2023-11-07

## 2023-11-07 DIAGNOSIS — E21.0 PRIMARY HYPERPARATHYROIDISM (HCC): Primary | ICD-10-CM

## 2023-11-07 PROCEDURE — 99024 POSTOP FOLLOW-UP VISIT: CPT | Performed by: STUDENT IN AN ORGANIZED HEALTH CARE EDUCATION/TRAINING PROGRAM

## 2023-11-07 NOTE — PROGRESS NOTES
Parul Mccormick is a 65year old male. Chief Complaint   Patient presents with    Post-Op     Parathyroidectomy, minimal draining in area. ASSESSMENT AND PLAN:   1. Primary hyperparathyroidism [facility])  56-year-old doing well after parathyroidectomy for primary hyperparathyroidism. His PTH and calcium normalized. He states that his legs and lower extremities are feeling better. He has had a little neck drainage from the left side of the incision. On exam has a small to moderate amount of serous drainage from his left lateral neck incision. No fluid collection under the incision that needs to be drained. A compressive dressing was placed. Doing well good result after the parathyroidectomy. Normalization of his labs and improved symptoms including leg pain. Small seroma managing conservatively with compressive dressing. She will follow-up next week if still continuing to drain. The patient indicates understanding of these issues and agrees to the plan. EXAM:   There were no vitals taken for this visit. Pertinent exam findings may also be noted above in assessment and plan     System Details   Skin Inspection - Normal.   Constitutional Overall appearance - Normal.   Head/Face Symmetric, TMJ tenderness not present    Eyes EOMI, PERRL   Right ear:  Canal clear, TM intact, no ROLY   Left ear:  Canal clear, TM intact, no ROLY   Nose: Septum midline, inferior turbinates not enlarged, nasal valves without collapse    Oral cavity/Oropharynx: No lesions or masses on inspection or palpation, tonsils symmetric    Neck: Soft without LAD, thyroid not enlarged  Voice clear/ no stridor   Other:      Scopes and Procedures:             Current Outpatient Medications   Medication Sig Dispense Refill    HYDROcodone-acetaminophen 5-325 MG Oral Tab Take 1 tablet by mouth every 6 (six) hours as needed for Pain. 12 tablet 0    calcitriol 0.25 MCG Oral Cap Take 1 capsule (0.25 mcg total) by mouth daily.  27 capsule 0    calcium carbonate 500 MG Oral Tab Take 2 tablets (1,000 mg total) by mouth 3 (three) times daily with meals for 7 days, THEN 2 tablets (1,000 mg total) 2 (two) times daily with meals for 7 days, THEN 2 tablets (1,000 mg total) daily for 7 days. 84 tablet 0    levothyroxine 200 MCG Oral Tab Take 1 tablet (200 mcg total) by mouth before breakfast. 90 tablet 0    insulin lispro (HUMALOG) 100 UNIT/ML Subcutaneous Solution Inject via insulin pump. Maximum 130 units daily 120 mL 1    semaglutide-weight management (WEGOVY) 1.7 MG/0.75ML Subcutaneous Solution Auto-injector Inject 0.75 mL (1.7 mg total) into the skin once a week. 4 each 0    Glucose Blood (CONTOUR NEXT TEST) In Vitro Strip Test 4-5 times daily 500 each 0    Insulin Pen Needle (PEN NEEDLES) 32G X 6 MM Does not apply Misc 1 each by Does not apply route 4 (four) times daily. Use to inject insulin 4 times daily . 400 each 0    Continuous Blood Gluc Transmit (DEXCOM G6 TRANSMITTER) Does not apply Misc 1 each by Does not apply route every 3 (three) months.  1 each 1    ASCENSIA MICROFILL TEST In Vitro Strip TEST SIX TIMES DAILY 200 Strip 2      Past Medical History:   Diagnosis Date    Calculus of kidney     Hearing impairment     [place] in L ear    High cholesterol     Personal history of urinary calculi     Pneumonia due to organism 2018    Type I (juvenile type) diabetes mellitus without mention of complication, not stated as uncontrolled     Unspecified septicemia(038.9)       Social History:  Social History     Socioeconomic History    Marital status:    Tobacco Use    Smoking status: Never    Smokeless tobacco: Never   Vaping Use    Vaping Use: Never used   Substance and Sexual Activity    Alcohol use: Not Currently     Comment: Rarely    Drug use: No   Other Topics Concern    Caffeine Concern Yes     Comment: soda 16oz/day    Exercise No          Basilio Morrissey MD  11/7/2023  9:07 AM

## 2023-11-07 NOTE — TELEPHONE ENCOUNTER
Confirmed received by pharmacy. Patient has picked this medication up per pharmacy. HYDROcodone-acetaminophen 5-325 MG Oral Tab 12 tablet 0 11/6/2023     Sig - Route:  Take 1 tablet by mouth every 6 (six) hours as needed for Pain. - Oral    Sent to pharmacy as: HYDROcodone-Acetaminophen 5-325 MG Oral Tablet (Norco)    Earliest Fill Date: 11/6/2023    E-Prescribing Status: Receipt confirmed by pharmacy (11/6/2023 11:19 AM CST)

## 2023-11-08 ENCOUNTER — OFFICE VISIT (OUTPATIENT)
Dept: OTOLARYNGOLOGY | Facility: CLINIC | Age: 58
End: 2023-11-08

## 2023-11-08 DIAGNOSIS — D35.1 PARATHYROID ADENOMA: Primary | ICD-10-CM

## 2023-11-08 PROCEDURE — 99024 POSTOP FOLLOW-UP VISIT: CPT | Performed by: STUDENT IN AN ORGANIZED HEALTH CARE EDUCATION/TRAINING PROGRAM

## 2023-11-08 RX ORDER — AMOXICILLIN AND CLAVULANATE POTASSIUM 875; 125 MG/1; MG/1
1 TABLET, FILM COATED ORAL EVERY 12 HOURS
Qty: 14 TABLET | Refills: 0 | Status: SHIPPED | OUTPATIENT
Start: 2023-11-08 | End: 2023-11-15

## 2023-11-08 NOTE — PROGRESS NOTES
Ankita Thacker is a 63year old male. Chief Complaint   Patient presents with    Post-Op     Per patient have leaking from wound site. ASSESSMENT AND PLAN:   1. Parathyroid adenoma  60-year-old presents with a little more opening of the incision on the left side of his neck. The drainage is coming from this area it is clearish yellow. On exam the left lateral aspect of his incision has opened about 1 and half centimeters. Clearish yellow seroma like fluid leaking from the incision. This was closed with 3 simple interrupted nylon sutures. Discussed that we will take time for the seroma to stop leaking. I will also cover him with oral antibiotics to avoid any wound infection given the dehiscence of the incision. I closed the incision but left a little opening laterally for the seroma to continue to drain or also will cause swelling in his neck. This was just a small opening that I left. It should heal up with additional suture or after the seroma has drained. I am going to be seeing him next week we will also consider suture removal in 1 to 2 weeks. He can call back sooner if there are issues. The patient indicates understanding of these issues and agrees to the plan. EXAM:   There were no vitals taken for this visit.     Pertinent exam findings may also be noted above in assessment and plan     System Details   Skin Inspection - Normal.   Constitutional Overall appearance - Normal.   Head/Face Symmetric, TMJ tenderness not present    Eyes EOMI, PERRL   Right ear:  Canal clear, TM intact, no ROLY   Left ear:  Canal clear, TM intact, no ROLY   Nose: Septum midline, inferior turbinates not enlarged, nasal valves without collapse    Oral cavity/Oropharynx: No lesions or masses on inspection or palpation, tonsils symmetric    Neck: Soft without LAD, thyroid not enlarged  Voice clear/ no stridor   Other:      Scopes and Procedures:             Current Outpatient Medications   Medication Sig Dispense Refill    HYDROcodone-acetaminophen 5-325 MG Oral Tab Take 1 tablet by mouth every 6 (six) hours as needed for Pain. 12 tablet 0    calcitriol 0.25 MCG Oral Cap Take 1 capsule (0.25 mcg total) by mouth daily. 30 capsule 0    calcium carbonate 500 MG Oral Tab Take 2 tablets (1,000 mg total) by mouth 3 (three) times daily with meals for 7 days, THEN 2 tablets (1,000 mg total) 2 (two) times daily with meals for 7 days, THEN 2 tablets (1,000 mg total) daily for 7 days. 84 tablet 0    levothyroxine 200 MCG Oral Tab Take 1 tablet (200 mcg total) by mouth before breakfast. 90 tablet 0    insulin lispro (HUMALOG) 100 UNIT/ML Subcutaneous Solution Inject via insulin pump. Maximum 130 units daily 120 mL 1    semaglutide-weight management (WEGOVY) 1.7 MG/0.75ML Subcutaneous Solution Auto-injector Inject 0.75 mL (1.7 mg total) into the skin once a week. 4 each 0    Glucose Blood (CONTOUR NEXT TEST) In Vitro Strip Test 4-5 times daily 500 each 0    Insulin Pen Needle (PEN NEEDLES) 32G X 6 MM Does not apply Misc 1 each by Does not apply route 4 (four) times daily. Use to inject insulin 4 times daily . 400 each 0    Continuous Blood Gluc Transmit (DEXCOM G6 TRANSMITTER) Does not apply Misc 1 each by Does not apply route every 3 (three) months.  1 each 1    ASCENSIA MICROFILL TEST In Vitro Strip TEST SIX TIMES DAILY 200 Strip 2      Past Medical History:   Diagnosis Date    Calculus of kidney     Hearing impairment     [place] in L ear    High cholesterol     Personal history of urinary calculi     Pneumonia due to organism 2018    Type I (juvenile type) diabetes mellitus without mention of complication, not stated as uncontrolled     Unspecified septicemia(038.9)       Social History:  Social History     Socioeconomic History    Marital status:    Tobacco Use    Smoking status: Never    Smokeless tobacco: Never   Vaping Use    Vaping Use: Never used   Substance and Sexual Activity    Alcohol use: Not Currently Comment: Rarely    Drug use: No   Other Topics Concern    Caffeine Concern Yes     Comment: soda 16oz/day    Exercise No          Kal Guajardo MD  11/8/2023  10:54 AM

## 2023-11-14 ENCOUNTER — OFFICE VISIT (OUTPATIENT)
Dept: OTOLARYNGOLOGY | Facility: CLINIC | Age: 58
End: 2023-11-14

## 2023-11-14 VITALS — BODY MASS INDEX: 33.32 KG/M2 | HEIGHT: 71 IN | WEIGHT: 238 LBS

## 2023-11-14 DIAGNOSIS — D35.1 PARATHYROID ADENOMA: Primary | ICD-10-CM

## 2023-11-14 PROCEDURE — 99024 POSTOP FOLLOW-UP VISIT: CPT | Performed by: STUDENT IN AN ORGANIZED HEALTH CARE EDUCATION/TRAINING PROGRAM

## 2023-11-14 PROCEDURE — 3008F BODY MASS INDEX DOCD: CPT | Performed by: STUDENT IN AN ORGANIZED HEALTH CARE EDUCATION/TRAINING PROGRAM

## 2023-12-09 PROCEDURE — 3061F NEG MICROALBUMINURIA REV: CPT | Performed by: FAMILY MEDICINE

## 2023-12-10 LAB
ALBUMIN/GLOBULIN RATIO: 1.9 (CALC) (ref 1–2.5)
ALBUMIN: 3.9 G/DL (ref 3.6–5.1)
ALKALINE PHOSPHATASE: 146 U/L (ref 35–144)
ALT: 15 U/L (ref 9–46)
AST: 15 U/L (ref 10–35)
BILIRUBIN, TOTAL: 0.6 MG/DL (ref 0.2–1.2)
BUN: 22 MG/DL (ref 7–25)
CALCIUM: 9.2 MG/DL (ref 8.6–10.3)
CARBON DIOXIDE: 25 MMOL/L (ref 20–32)
CHLORIDE: 107 MMOL/L (ref 98–110)
CHOL/HDLC RATIO: 4 (CALC)
CHOLESTEROL, TOTAL: 211 MG/DL
CREATININE, RANDOM URINE: 107 MG/DL (ref 20–320)
CREATININE: 0.96 MG/DL (ref 0.7–1.3)
EGFR: 92 ML/MIN/1.73M2
GLOBULIN: 2.1 G/DL (CALC) (ref 1.9–3.7)
GLUCOSE: 238 MG/DL (ref 65–99)
HDL CHOLESTEROL: 53 MG/DL
LDL-CHOLESTEROL: 116 MG/DL (CALC)
MICROALBUMIN/CREATININE RATIO, RANDOM URINE: 6 MCG/MG CREAT
MICROALBUMIN: 0.6 MG/DL
NON-HDL CHOLESTEROL: 158 MG/DL (CALC)
POTASSIUM: 4.6 MMOL/L (ref 3.5–5.3)
PROTEIN, TOTAL: 6 G/DL (ref 6.1–8.1)
SODIUM: 141 MMOL/L (ref 135–146)
TRIGLYCERIDES: 314 MG/DL
VITAMIN D, 25-OH, TOTAL: 26 NG/ML (ref 30–100)

## 2023-12-11 ENCOUNTER — OFFICE VISIT (OUTPATIENT)
Dept: FAMILY MEDICINE CLINIC | Facility: CLINIC | Age: 58
End: 2023-12-11
Payer: COMMERCIAL

## 2023-12-11 ENCOUNTER — LAB ENCOUNTER (OUTPATIENT)
Dept: LAB | Age: 58
End: 2023-12-11
Attending: FAMILY MEDICINE
Payer: COMMERCIAL

## 2023-12-11 VITALS
SYSTOLIC BLOOD PRESSURE: 122 MMHG | HEART RATE: 74 BPM | DIASTOLIC BLOOD PRESSURE: 66 MMHG | HEIGHT: 70.5 IN | WEIGHT: 246 LBS | OXYGEN SATURATION: 95 % | BODY MASS INDEX: 34.82 KG/M2

## 2023-12-11 DIAGNOSIS — L08.9 WOUND INFECTION: Primary | ICD-10-CM

## 2023-12-11 DIAGNOSIS — T14.8XXA WOUND INFECTION: Primary | ICD-10-CM

## 2023-12-11 LAB
BASOPHILS # BLD AUTO: 0.13 X10(3) UL (ref 0–0.2)
BASOPHILS NFR BLD AUTO: 1.5 %
EOSINOPHIL # BLD AUTO: 0.29 X10(3) UL (ref 0–0.7)
EOSINOPHIL NFR BLD AUTO: 3.4 %
ERYTHROCYTE [DISTWIDTH] IN BLOOD BY AUTOMATED COUNT: 12.7 %
HCT VFR BLD AUTO: 46.9 %
HGB BLD-MCNC: 16 G/DL
IMM GRANULOCYTES # BLD AUTO: 0.02 X10(3) UL (ref 0–1)
IMM GRANULOCYTES NFR BLD: 0.2 %
LYMPHOCYTES # BLD AUTO: 1.41 X10(3) UL (ref 1–4)
LYMPHOCYTES NFR BLD AUTO: 16.6 %
MCH RBC QN AUTO: 28.1 PG (ref 26–34)
MCHC RBC AUTO-ENTMCNC: 34.1 G/DL (ref 31–37)
MCV RBC AUTO: 82.4 FL
MONOCYTES # BLD AUTO: 0.84 X10(3) UL (ref 0.1–1)
MONOCYTES NFR BLD AUTO: 9.9 %
NEUTROPHILS # BLD AUTO: 5.79 X10 (3) UL (ref 1.5–7.7)
NEUTROPHILS # BLD AUTO: 5.79 X10(3) UL (ref 1.5–7.7)
NEUTROPHILS NFR BLD AUTO: 68.4 %
PLATELET # BLD AUTO: 257 10(3)UL (ref 150–450)
RBC # BLD AUTO: 5.69 X10(6)UL
WBC # BLD AUTO: 8.5 X10(3) UL (ref 4–11)

## 2023-12-11 PROCEDURE — 85025 COMPLETE CBC W/AUTO DIFF WBC: CPT | Performed by: FAMILY MEDICINE

## 2023-12-11 PROCEDURE — 3074F SYST BP LT 130 MM HG: CPT | Performed by: FAMILY MEDICINE

## 2023-12-11 PROCEDURE — 3078F DIAST BP <80 MM HG: CPT | Performed by: FAMILY MEDICINE

## 2023-12-11 PROCEDURE — 99213 OFFICE O/P EST LOW 20 MIN: CPT | Performed by: FAMILY MEDICINE

## 2023-12-11 PROCEDURE — 3008F BODY MASS INDEX DOCD: CPT | Performed by: FAMILY MEDICINE

## 2023-12-11 PROCEDURE — 36415 COLL VENOUS BLD VENIPUNCTURE: CPT | Performed by: FAMILY MEDICINE

## 2023-12-11 RX ORDER — SULFAMETHOXAZOLE AND TRIMETHOPRIM 800; 160 MG/1; MG/1
1 TABLET ORAL 2 TIMES DAILY
Qty: 20 TABLET | Refills: 0 | Status: SHIPPED | OUTPATIENT
Start: 2023-12-11

## 2023-12-11 NOTE — PROGRESS NOTES
Subjective:   Patient ID: Lisa Narvaez is a 63year old male. Saturday noted a swelling and redness on middle of incision. Mildly painful. No discharge. No F/C.  1 month post op. Hasn't contacted surgery yet. History/Other:   Review of Systems   All other systems reviewed and are negative. Current Outpatient Medications   Medication Sig Dispense Refill    sulfamethoxazole-trimethoprim -160 MG Oral Tab per tablet Take 1 tablet by mouth 2 (two) times daily. 20 tablet 0    HYDROcodone-acetaminophen 5-325 MG Oral Tab Take 1 tablet by mouth every 6 (six) hours as needed for Pain. 12 tablet 0    calcitriol 0.25 MCG Oral Cap Take 1 capsule (0.25 mcg total) by mouth daily. 30 capsule 0    levothyroxine 200 MCG Oral Tab Take 1 tablet (200 mcg total) by mouth before breakfast. 90 tablet 0    insulin lispro (HUMALOG) 100 UNIT/ML Subcutaneous Solution Inject via insulin pump. Maximum 130 units daily 120 mL 1    semaglutide-weight management (WEGOVY) 1.7 MG/0.75ML Subcutaneous Solution Auto-injector Inject 0.75 mL (1.7 mg total) into the skin once a week. (Patient taking differently: Inject 0.75 mL (1.7 mg total) into the skin once a week. Patient took one time after it was on hold and then got nauseous and hasn't taken since) 4 each 0    Glucose Blood (CONTOUR NEXT TEST) In Vitro Strip Test 4-5 times daily 500 each 0    Insulin Pen Needle (PEN NEEDLES) 32G X 6 MM Does not apply Misc 1 each by Does not apply route 4 (four) times daily. Use to inject insulin 4 times daily . 400 each 0    Continuous Blood Gluc Transmit (DEXCOM G6 TRANSMITTER) Does not apply Misc 1 each by Does not apply route every 3 (three) months. 1 each 1    ASCENSIA MICROFILL TEST In Vitro Strip TEST SIX TIMES DAILY 200 Strip 2     Allergies: Allergies   Allergen Reactions    Bee Venom        Objective:   Physical Exam  Vitals reviewed. Constitutional:       General: He is not in acute distress.      Appearance: He is well-developed. He is not diaphoretic. Eyes:      General: No scleral icterus. Right eye: No discharge. Left eye: No discharge. Conjunctiva/sclera: Conjunctivae normal.   Neck:      Comments: Mild tenderness, redness, fluctuance on center of neck. Cardiovascular:      Rate and Rhythm: Normal rate and regular rhythm. Heart sounds: Normal heart sounds. Pulmonary:      Effort: Pulmonary effort is normal. No respiratory distress. Breath sounds: Normal breath sounds. No wheezing or rales. Assessment & Plan:   1. Wound infection      1. Wound infection  - CBC W Differential W Platelet [E]  - sulfamethoxazole-trimethoprim -160 MG Oral Tab per tablet; Take 1 tablet by mouth 2 (two) times daily. Dispense: 20 tablet; Refill: 0  - Follow up up surgeon. Orders Placed This Encounter   Procedures    CBC W Differential W Platelet [E]     Meds This Visit:  Requested Prescriptions     Signed Prescriptions Disp Refills    sulfamethoxazole-trimethoprim -160 MG Oral Tab per tablet 20 tablet 0     Sig: Take 1 tablet by mouth 2 (two) times daily.      Imaging & Referrals:  None

## 2023-12-12 ENCOUNTER — TELEPHONE (OUTPATIENT)
Dept: OTOLARYNGOLOGY | Facility: CLINIC | Age: 58
End: 2023-12-12

## 2023-12-12 ENCOUNTER — OFFICE VISIT (OUTPATIENT)
Dept: OTOLARYNGOLOGY | Facility: CLINIC | Age: 58
End: 2023-12-12

## 2023-12-12 VITALS — BODY MASS INDEX: 34.82 KG/M2 | HEIGHT: 70.5 IN | WEIGHT: 246 LBS

## 2023-12-12 DIAGNOSIS — L02.11 NECK ABSCESS: Primary | ICD-10-CM

## 2023-12-12 PROCEDURE — 99024 POSTOP FOLLOW-UP VISIT: CPT | Performed by: STUDENT IN AN ORGANIZED HEALTH CARE EDUCATION/TRAINING PROGRAM

## 2023-12-12 PROCEDURE — 3008F BODY MASS INDEX DOCD: CPT | Performed by: STUDENT IN AN ORGANIZED HEALTH CARE EDUCATION/TRAINING PROGRAM

## 2023-12-12 PROCEDURE — 10021 FNA BX W/O IMG GDN 1ST LES: CPT | Performed by: STUDENT IN AN ORGANIZED HEALTH CARE EDUCATION/TRAINING PROGRAM

## 2023-12-12 NOTE — TELEPHONE ENCOUNTER
Contacted patient and appointment made for incision check.   Future Appointments   Date Time Provider Antonina Padilla   12/12/2023  2:30 PM Sriram Andujar MD [address] [facility]

## 2023-12-23 ENCOUNTER — HOSPITAL ENCOUNTER (OUTPATIENT)
Age: 58
Discharge: HOME | End: 2023-12-23
Attending: EMERGENCY MEDICINE
Payer: COMMERCIAL

## 2023-12-23 VITALS
DIASTOLIC BLOOD PRESSURE: 66 MMHG | WEIGHT: 230 LBS | BODY MASS INDEX: 32.93 KG/M2 | HEART RATE: 93 BPM | TEMPERATURE: 100 F | RESPIRATION RATE: 20 BRPM | OXYGEN SATURATION: 98 % | HEIGHT: 70 IN | SYSTOLIC BLOOD PRESSURE: 148 MMHG

## 2023-12-23 DIAGNOSIS — B09 VIRAL EXANTHEM: Primary | ICD-10-CM

## 2023-12-23 LAB
POCT INFLUENZA A: NEGATIVE
POCT INFLUENZA B: NEGATIVE
S PYO AG THROAT QL IA.RAPID: NEGATIVE
SARS-COV-2 RNA RESP QL NAA+PROBE: NOT DETECTED

## 2023-12-23 PROCEDURE — 99213 OFFICE O/P EST LOW 20 MIN: CPT

## 2023-12-23 PROCEDURE — 99214 OFFICE O/P EST MOD 30 MIN: CPT

## 2023-12-23 PROCEDURE — 87651 STREP A DNA AMP PROBE: CPT | Performed by: EMERGENCY MEDICINE

## 2023-12-23 PROCEDURE — 36415 COLL VENOUS BLD VENIPUNCTURE: CPT

## 2023-12-23 PROCEDURE — 87502 INFLUENZA DNA AMP PROBE: CPT | Performed by: EMERGENCY MEDICINE

## 2023-12-23 RX ORDER — PREDNISONE 20 MG/1
40 TABLET ORAL DAILY
Qty: 10 TABLET | Refills: 0 | Status: SHIPPED | OUTPATIENT
Start: 2023-12-23 | End: 2023-12-28

## 2023-12-23 RX ORDER — DIPHENHYDRAMINE HYDROCHLORIDE 50 MG/ML
25 INJECTION INTRAMUSCULAR; INTRAVENOUS ONCE
Status: DISCONTINUED | OUTPATIENT
Start: 2023-12-23 | End: 2023-12-23

## 2023-12-23 RX ORDER — DIPHENHYDRAMINE HCL 25 MG
25 CAPSULE ORAL ONCE
Status: COMPLETED | OUTPATIENT
Start: 2023-12-23 | End: 2023-12-23

## 2023-12-23 RX ORDER — PREDNISONE 20 MG/1
40 TABLET ORAL ONCE
Status: COMPLETED | OUTPATIENT
Start: 2023-12-23 | End: 2023-12-23

## 2023-12-23 NOTE — DISCHARGE INSTRUCTIONS
Follow-up with your primary care doctor  Take Benadryl 1 tablet every 6 hours as needed until rash is resolved  Take prednisone 40 mg a day for 5 days  Take Tylenol or Motrin for any fever  Return to the emergency department if there is worsening symptoms or new concerns

## 2024-01-03 ENCOUNTER — TELEPHONE (OUTPATIENT)
Dept: ENDOCRINOLOGY CLINIC | Facility: CLINIC | Age: 59
End: 2024-01-03

## 2024-01-03 NOTE — TELEPHONE ENCOUNTER
Received fax from [facility] requesting pt last 3 months of clinical office notes pertaining to diabetes treatment and consultation    Unable to fulfill request, pt hasn't been seen since August. 2023. Will reach out via my chart to assist, pt will scheduling a follow up appointment.

## 2024-02-18 ENCOUNTER — APPOINTMENT (OUTPATIENT)
Dept: GENERAL RADIOLOGY | Age: 59
End: 2024-02-18
Attending: PHYSICIAN ASSISTANT
Payer: COMMERCIAL

## 2024-02-18 ENCOUNTER — HOSPITAL ENCOUNTER (OUTPATIENT)
Age: 59
Discharge: HOME | End: 2024-02-18
Payer: COMMERCIAL

## 2024-02-18 VITALS
OXYGEN SATURATION: 98 % | DIASTOLIC BLOOD PRESSURE: 69 MMHG | BODY MASS INDEX: 34.3 KG/M2 | TEMPERATURE: 99 F | WEIGHT: 245 LBS | HEART RATE: 91 BPM | RESPIRATION RATE: 22 BRPM | SYSTOLIC BLOOD PRESSURE: 143 MMHG | HEIGHT: 71 IN

## 2024-02-18 DIAGNOSIS — R05.3 PERSISTENT COUGH: Primary | ICD-10-CM

## 2024-02-18 DIAGNOSIS — R09.82 PND (POST-NASAL DRIP): ICD-10-CM

## 2024-02-18 LAB
POCT INFLUENZA A: NEGATIVE
POCT INFLUENZA B: NEGATIVE
SARS-COV-2 RNA RESP QL NAA+PROBE: NOT DETECTED

## 2024-02-18 PROCEDURE — 87502 INFLUENZA DNA AMP PROBE: CPT | Performed by: PHYSICIAN ASSISTANT

## 2024-02-18 PROCEDURE — 94664 DEMO&/EVAL PT USE INHALER: CPT

## 2024-02-18 PROCEDURE — 99214 OFFICE O/P EST MOD 30 MIN: CPT

## 2024-02-18 PROCEDURE — 71046 X-RAY EXAM CHEST 2 VIEWS: CPT | Performed by: PHYSICIAN ASSISTANT

## 2024-02-18 RX ORDER — ALBUTEROL SULFATE 90 UG/1
2 AEROSOL, METERED RESPIRATORY (INHALATION) EVERY 4 HOURS PRN
Qty: 1 EACH | Refills: 0 | Status: SHIPPED | OUTPATIENT
Start: 2024-02-18 | End: 2024-03-19

## 2024-02-18 RX ORDER — DEXAMETHASONE 4 MG/1
4 TABLET ORAL ONCE
Status: COMPLETED | OUTPATIENT
Start: 2024-02-18 | End: 2024-02-18

## 2024-02-18 RX ORDER — AZITHROMYCIN 250 MG/1
TABLET, FILM COATED ORAL
Qty: 6 TABLET | Refills: 0 | Status: SHIPPED | OUTPATIENT
Start: 2024-02-18 | End: 2024-02-23

## 2024-02-18 RX ORDER — PREDNISONE 20 MG/1
40 TABLET ORAL DAILY
Qty: 8 TABLET | Refills: 0 | Status: SHIPPED | OUTPATIENT
Start: 2024-02-18 | End: 2024-02-22

## 2024-02-18 NOTE — ED QUICK NOTES
Patient instructed on the use of MDI.  Patient verbalizes understanding of MDI use.  Pt given MDI handout.

## 2024-02-18 NOTE — DISCHARGE INSTRUCTIONS
Please return to the ER/clinic if symptoms worsen. Follow-up with your PCP in 24-48 hours as needed.    The Decadron will work in your system the next several days.   You may start the additional prednisone on day 2 or 3 if symptoms persist.  Take the full course of antibiotics as prescribed in tandem with a probiotic daily.  Recommend taking an over the counter antihistamine daily: IE zyrtec/claritin.  Sleep more upright. Use chloraseptic spray to help stop the cough trigger reflex.  Push fluids and gargle with warm saline rinses.   If symptoms persist or worsen IE increasing fevers or shortness of breath go directly to the emergency room.  Otherwise follow-up with your primary care physician for further evaluation and treatment.

## 2024-02-18 NOTE — ED INITIAL ASSESSMENT (HPI)
C/o cough for 3 days, with wheezing. Has Asthma. Hx of Pneumonia. Just came back from cruise. Some chills

## 2024-02-18 NOTE — ED PROVIDER NOTES
Patient Seen in: [facility]      History     Chief Complaint   Patient presents with    Cough/URI     Stated Complaint: cough x 3 days    Subjective:   HPI    57-year-old male here with complaint of a 3-day history of wheezy productive cough.  Patient denies chest pain, shortness of breath, abdominal pain, nausea, vomiting or diarrhea.  Patient is tolerating p.o. speaking full sentences.  Afebrile.    Objective:   Past Medical History:   Diagnosis Date    Calculus of kidney     Hearing impairment     [place] in L ear    High cholesterol     Personal history of urinary calculi     Pneumonia due to organism 2018    Type 2 diabetes mellitus with hyperglycemia, with long-term current use of insulin ([facility]) 08/31/2022    Type I (juvenile type) diabetes mellitus without mention of complication, not stated as uncontrolled     Unspecified septicemia(038.9)               The patient's medication list, past medical history and social history elements  as listed in today's nurse's notes are reviewed and agree.   The patient's family history is reviewed and is noncontributory to the presenting problem, except as indicated as above.   Past Surgical History:   Procedure Laterality Date    APPENDECTOMY      COLONOSCOPY  01/01/2008    OTHER      lithotripsy    OTHER SURGICAL HISTORY      bilat knees torn miniscus    OTHER SURGICAL HISTORY  10/30/2023    Parathyroidectomy    THYROIDECTOMY                  Social History     Socioeconomic History    Marital status:    Tobacco Use    Smoking status: Never    Smokeless tobacco: Never   Vaping Use    Vaping Use: Never used   Substance and Sexual Activity    Alcohol use: Not Currently     Comment: Rarely    Drug use: No   Other Topics Concern    Caffeine Concern Yes     Comment: soda 16oz/day    Exercise No              Review of Systems    Positive for stated complaint: cough x 3 days  Other systems are as noted in HPI.  Constitutional and vital signs reviewed.       All other systems reviewed and negative except as noted above.    Physical Exam     ED Triage Vitals   BP 02/18/24 1014 143/69   Pulse 02/18/24 1014 91   Resp 02/18/24 1014 22   Temp 02/18/24 1014 98.9 °F (37.2 °C)   Temp src 02/18/24 1014 Temporal   SpO2 02/18/24 1028 98 %   O2 Device 02/18/24 1014 None (Room air)       Current:/69   Pulse 91   Temp 98.9 °F (37.2 °C) (Temporal)   Resp 22   Ht 180.3 cm (5' 11\")   Wt 111.1 kg   SpO2 98%   BMI 34.17 kg/m²         Physical Exam  Vitals and nursing note reviewed.   Constitutional:       Appearance: Normal appearance. He is well-developed.   HENT:      Head: Normocephalic.      Jaw: There is normal jaw occlusion.      Right Ear: External ear normal.      Left Ear: External ear normal.      Nose: Mucosal edema, congestion and rhinorrhea present. Rhinorrhea is clear.      Mouth/Throat:      Lips: Pink.      Mouth: Mucous membranes are moist.      Pharynx: Oropharynx is clear.      Comments: uvula midline: No trismus or drooling: No peritonsillar abscess noted moderate cobblestoning the posterior pharynx.  Eyes:      Conjunctiva/sclera: Conjunctivae normal.      Pupils: Pupils are equal, round, and reactive to light.   Cardiovascular:      Rate and Rhythm: Normal rate and regular rhythm.      Heart sounds: Normal heart sounds.   Pulmonary:      Effort: Pulmonary effort is normal.      Breath sounds: Wheezing and rhonchi present.   Musculoskeletal:      Cervical back: Normal range of motion and neck supple.   Skin:     General: Skin is warm.      Capillary Refill: Capillary refill takes less than 2 seconds.   Neurological:      General: No focal deficit present.      Mental Status: He is alert and oriented to person, place, and time.   Psychiatric:         Mood and Affect: Mood normal.         Behavior: Behavior normal.         Thought Content: Thought content normal.         Judgment: Judgment normal.             ED Course     Labs Reviewed   POCT FLU TEST  - Normal    Narrative:     This assay is a rapid molecular in vitro test utilizing nucleic acid amplification of influenza A and B viral RNA.   RAPID SARS-COV-2 BY PCR - Normal     I personally reviewed the xray images and and saw these findings: interstitial markings   XR CHEST PA + LAT CHEST (CPT=71046)    Result Date: 2/18/2024  PROCEDURE:  XR CHEST PA + LAT CHEST (CPT=71046)  INDICATIONS:  cough x 3 days  COMPARISON:  GIACOMO XR, XR CHEST PA + LAT CHEST (CPT=71046), 1/25/2020, 11:05 AM.  TECHNIQUE:  PA and lateral chest radiographs were obtained.  PATIENT STATED HISTORY: (As transcribed by Technologist)  Patient states cough with wheezing x 3 days. No fever. Hx Asthma    FINDINGS:  LUNGS:  Prominent interstitial markings in the mid-lungs and bases.  No focal consolidation.  CARDIAC:  Normal size cardiac silhouette. MEDIASTINUM:  Tortuous aorta. PLEURA:  No pneumothorax..  No pleural effusions. BONES:  Mild endplate hypertrophic changes thoracic spine.            CONCLUSION:  Prominent interstitial markings in the mid-lungs and bases.  This may be related to viral etiology.   LOCATION:  [facility]   Dictated by (CST): Mini Laura MD on 2/18/2024 at 11:30 AM     Finalized by (CST): Mini Laura MD on 2/18/2024 at 11:31 AM                   MDM   Clinical Impression: persistent cough/PND  Course of Treatment:   The Decadron will work in your system the next several days.   You may start the additional prednisone on day 2 or 3 if symptoms persist.  Take the full course of antibiotics as prescribed in tandem with a probiotic daily.  Recommend taking an over the counter antihistamine daily: IE zyrtec/claritin.  Sleep more upright. Use chloraseptic spray to help stop the cough trigger reflex.  Push fluids and gargle with warm saline rinses.   If symptoms persist or worsen IE increasing fevers or shortness of breath go directly to the emergency room.  Otherwise follow-up with your primary care physician for  further evaluation and treatment.      The patient is encouraged to return if any concerning symptoms arise. Additional verbal discharge instructions are given and discussed. Discharge medications are discussed. The patient is in good condition throughout the visit today and remains so upon discharge. I discuss the plan of care with the patient, who expresses understanding. All questions and concerns are addressed to the patient's satisfaction prior to discharge today.  Previous conversations with PCP and charts were reviewed.                                           Disposition and Plan     Clinical Impression:  1. Persistent cough    2. PND (post-nasal drip)         Disposition:  Discharge  2/18/2024 11:45 am    Follow-up:  Bertrand Mariscal DO  [address] [place] [id number] [phone]                Medications Prescribed:  Current Discharge Medication List        START taking these medications    Details   albuterol 108 (90 Base) MCG/ACT Inhalation Aero Soln Inhale 2 puffs into the lungs every 4 (four) hours as needed for Wheezing.  Qty: 1 each, Refills: 0      azithromycin (ZITHROMAX Z-ALBER) 250 MG Oral Tab 500 mg once followed by 250 mg daily x 4 days  Qty: 6 tablet, Refills: 0

## 2024-02-20 ENCOUNTER — LAB ENCOUNTER (OUTPATIENT)
Dept: LAB | Age: 59
End: 2024-02-20
Attending: FAMILY MEDICINE
Payer: COMMERCIAL

## 2024-02-20 ENCOUNTER — OFFICE VISIT (OUTPATIENT)
Dept: FAMILY MEDICINE CLINIC | Facility: CLINIC | Age: 59
End: 2024-02-20
Payer: COMMERCIAL

## 2024-02-20 VITALS
BODY MASS INDEX: 35.7 KG/M2 | SYSTOLIC BLOOD PRESSURE: 154 MMHG | HEART RATE: 78 BPM | WEIGHT: 255 LBS | HEIGHT: 71 IN | DIASTOLIC BLOOD PRESSURE: 64 MMHG | OXYGEN SATURATION: 91 %

## 2024-02-20 DIAGNOSIS — R06.00 DYSPNEA, UNSPECIFIED TYPE: ICD-10-CM

## 2024-02-20 DIAGNOSIS — R09.02 HYPOXIA: Primary | ICD-10-CM

## 2024-02-20 LAB — D DIMER PPP FEU-MCNC: 0.4 UG/ML FEU (ref ?–0.57)

## 2024-02-20 PROCEDURE — 99214 OFFICE O/P EST MOD 30 MIN: CPT | Performed by: FAMILY MEDICINE

## 2024-02-20 PROCEDURE — 3078F DIAST BP <80 MM HG: CPT | Performed by: FAMILY MEDICINE

## 2024-02-20 PROCEDURE — 85379 FIBRIN DEGRADATION QUANT: CPT | Performed by: FAMILY MEDICINE

## 2024-02-20 PROCEDURE — 3008F BODY MASS INDEX DOCD: CPT | Performed by: FAMILY MEDICINE

## 2024-02-20 PROCEDURE — 3077F SYST BP >= 140 MM HG: CPT | Performed by: FAMILY MEDICINE

## 2024-02-20 RX ORDER — PREDNISONE 20 MG/1
TABLET ORAL
Qty: 13 TABLET | Refills: 0 | Status: SHIPPED | OUTPATIENT
Start: 2024-02-20

## 2024-02-20 RX ORDER — INSULIN LISPRO 100 [IU]/ML
130 INJECTION, SOLUTION INTRAVENOUS; SUBCUTANEOUS DAILY
COMMUNITY
Start: 2024-01-25 | End: 2024-02-21

## 2024-02-20 NOTE — PROGRESS NOTES
Subjective:   Patient ID: Montrell Emerson is a 58 year old male.    Was on a cruise.  Last 2 days of cruise started with cough.  Then recently started wheezing.  Previously had walking pneumonia when he was like this.  + chills, slight sweats.  No fever.  No CP.  Dyspnea.  Wife states he has trouble breathing like death rattle when sleeping.  Unable to sleep flat. Oxygen dropping down to 88% at night.  91% with exertion.  Chronic unchanged calf pain and leg swelling.    History/Other:   Review of Systems   All other systems reviewed and are negative.    Current Outpatient Medications   Medication Sig Dispense Refill    predniSONE 20 MG Oral Tab 2.5 tab day 1-3, 2 tab day 4, 1.5 tab day 5, 1 tab day 6, 0.5 tab day 7 13 tablet 0    predniSONE 20 MG Oral Tab Take 2 tablets (40 mg total) by mouth daily for 4 days. 8 tablet 0    albuterol 108 (90 Base) MCG/ACT Inhalation Aero Soln Inhale 2 puffs into the lungs every 4 (four) hours as needed for Wheezing. 1 each 0    azithromycin (ZITHROMAX Z-ALBER) 250 MG Oral Tab 500 mg once followed by 250 mg daily x 4 days 6 tablet 0    levothyroxine 200 MCG Oral Tab Take 1 tablet (200 mcg total) by mouth before breakfast. 90 tablet 0    Glucose Blood (CONTOUR NEXT TEST) In Vitro Strip Test 4-5 times daily 500 each 0    Insulin Pen Needle (PEN NEEDLES) 32G X 6 MM Does not apply Misc 1 each by Does not apply route 4 (four) times daily. Use to inject insulin 4 times daily . 400 each 0    Continuous Blood Gluc Transmit (DEXCOM G6 TRANSMITTER) Does not apply Misc 1 each by Does not apply route every 3 (three) months. 1 each 1    HUMALOG 100 UNIT/ML Injection Solution 130 Units by Insulin pump route daily. 40 mL 11    HYDROcodone-acetaminophen 5-325 MG Oral Tab Take 1 tablet by mouth every 6 (six) hours as needed for Pain. (Patient not taking: Reported on 2/18/2024) 12 tablet 0    calcitriol 0.25 MCG Oral Cap Take 1 capsule (0.25 mcg total) by mouth daily. (Patient not taking: Reported  on 2024) 30 capsule 0    semaglutide-weight management (WEGOVY) 1.7 MG/0.75ML Subcutaneous Solution Auto-injector Inject 0.75 mL (1.7 mg total) into the skin once a week. (Patient not taking: Reported on 2024) 4 each 0    ASCENSIA MICROFILL TEST In Vitro Strip TEST SIX TIMES DAILY 200 Strip 2     Allergies:  Allergies   Allergen Reactions    Bee Venom      Objective:   Physical Exam  Vitals reviewed.   Constitutional:       General: He is not in acute distress.     Appearance: He is well-developed. He is not diaphoretic.   Eyes:      General: No scleral icterus.        Right eye: No discharge.         Left eye: No discharge.      Conjunctiva/sclera: Conjunctivae normal.   Cardiovascular:      Rate and Rhythm: Normal rate and regular rhythm.      Heart sounds: Normal heart sounds.   Pulmonary:      Effort: Pulmonary effort is normal. No respiratory distress.      Breath sounds: No wheezing or rales.      Comments: Decreased breath sounds at bases.      Assessment & Plan:   1. Hypoxia    2. Dyspnea, unspecified type      1. Hypoxia  - predniSONE 20 MG Oral Tab; 2.5 tab day 1-3, 2 tab day 4, 1.5 tab day 5, 1 tab day 6, 0.5 tab day 7  Dispense: 13 tablet; Refill: 0  - D-Dimer [E]    2. Dyspnea, unspecified type  Get oxygen 2 L for night time as oxygen dropping below 88%, and dyspnea during the day.      ICD-10-CM      Meds This Visit:  Requested Prescriptions     Signed Prescriptions Disp Refills    predniSONE 20 MG Oral Tab 13 tablet 0     Si.5 tab day 1-3, 2 tab day 4, 1.5 tab day 5, 1 tab day 6, 0.5 tab day 7     Imaging & Referrals:  [facility]

## 2024-02-21 DIAGNOSIS — E10.9 TYPE 1 DIABETES MELLITUS WITHOUT COMPLICATION (HCC): Primary | ICD-10-CM

## 2024-02-21 RX ORDER — INSULIN LISPRO 100 [IU]/ML
130 INJECTION, SOLUTION INTRAVENOUS; SUBCUTANEOUS DAILY
Qty: 40 ML | Refills: 11 | Status: SHIPPED | OUTPATIENT
Start: 2024-02-21

## 2024-02-21 NOTE — TELEPHONE ENCOUNTER
A refill request was received for:  Requested Prescriptions     Pending Prescriptions Disp Refills    HUMALOG 100 UNIT/ML Injection Solution  0     Si Units by Insulin pump route daily.       Last refill date:   2023    Last office visit: 2023    Follow up due:  Future Appointments   Date Time Provider Department Center   2024  5:30 PM [facility]

## 2024-02-23 ENCOUNTER — HOSPITAL ENCOUNTER (OUTPATIENT)
Dept: CT IMAGING | Age: 59
Discharge: HOME | End: 2024-02-23
Attending: FAMILY MEDICINE
Payer: COMMERCIAL

## 2024-02-23 ENCOUNTER — OFFICE VISIT (OUTPATIENT)
Dept: FAMILY MEDICINE CLINIC | Facility: CLINIC | Age: 59
End: 2024-02-23
Payer: COMMERCIAL

## 2024-02-23 VITALS
WEIGHT: 255 LBS | HEART RATE: 79 BPM | BODY MASS INDEX: 35.7 KG/M2 | SYSTOLIC BLOOD PRESSURE: 138 MMHG | HEIGHT: 71 IN | DIASTOLIC BLOOD PRESSURE: 60 MMHG | RESPIRATION RATE: 22 BRPM | OXYGEN SATURATION: 92 %

## 2024-02-23 DIAGNOSIS — R09.02 HYPOXIA: Primary | ICD-10-CM

## 2024-02-23 DIAGNOSIS — R06.2 WHEEZING: ICD-10-CM

## 2024-02-23 DIAGNOSIS — R09.02 HYPOXIA: ICD-10-CM

## 2024-02-23 LAB
CREAT BLD-MCNC: 1 MG/DL
EGFRCR SERPLBLD CKD-EPI 2021: 88 ML/MIN/1.73M2 (ref 60–?)

## 2024-02-23 PROCEDURE — 3078F DIAST BP <80 MM HG: CPT | Performed by: FAMILY MEDICINE

## 2024-02-23 PROCEDURE — 71275 CT ANGIOGRAPHY CHEST: CPT | Performed by: FAMILY MEDICINE

## 2024-02-23 PROCEDURE — 3075F SYST BP GE 130 - 139MM HG: CPT | Performed by: FAMILY MEDICINE

## 2024-02-23 PROCEDURE — 99213 OFFICE O/P EST LOW 20 MIN: CPT | Performed by: FAMILY MEDICINE

## 2024-02-23 PROCEDURE — 3008F BODY MASS INDEX DOCD: CPT | Performed by: FAMILY MEDICINE

## 2024-02-23 PROCEDURE — 82565 ASSAY OF CREATININE: CPT

## 2024-02-23 NOTE — PROGRESS NOTES
Subjective:   Patient ID: Montrell Emerson is a 58 year old male.    Cxr showing viral lung infection.  Covid negative.  + hx of asthma.  + Hypoxia.  92 % now at rest, has dropped to 88% with sleep and activity.  + sob on exertion.  Had some mild chest pain on left lower side. D-dimer normal.  No CP now.  Initially had fevers, non now.  SOB and fatigue have not improved despite inhalers, prednisone, zpak.  Was not able to get home oxygen yet.        History/Other:   Review of Systems   All other systems reviewed and are negative.    Current Outpatient Medications   Medication Sig Dispense Refill    HUMALOG 100 UNIT/ML Injection Solution 130 Units by Insulin pump route daily. 40 mL 11    predniSONE 20 MG Oral Tab 2.5 tab day 1-3, 2 tab day 4, 1.5 tab day 5, 1 tab day 6, 0.5 tab day 7 13 tablet 0    albuterol 108 (90 Base) MCG/ACT Inhalation Aero Soln Inhale 2 puffs into the lungs every 4 (four) hours as needed for Wheezing. 1 each 0    azithromycin (ZITHROMAX Z-ALBER) 250 MG Oral Tab 500 mg once followed by 250 mg daily x 4 days 6 tablet 0    HYDROcodone-acetaminophen 5-325 MG Oral Tab Take 1 tablet by mouth every 6 (six) hours as needed for Pain. 12 tablet 0    calcitriol 0.25 MCG Oral Cap Take 1 capsule (0.25 mcg total) by mouth daily. 30 capsule 0    levothyroxine 200 MCG Oral Tab Take 1 tablet (200 mcg total) by mouth before breakfast. 90 tablet 0    semaglutide-weight management (WEGOVY) 1.7 MG/0.75ML Subcutaneous Solution Auto-injector Inject 0.75 mL (1.7 mg total) into the skin once a week. 4 each 0    Glucose Blood (CONTOUR NEXT TEST) In Vitro Strip Test 4-5 times daily 500 each 0    Insulin Pen Needle (PEN NEEDLES) 32G X 6 MM Does not apply Misc 1 each by Does not apply route 4 (four) times daily. Use to inject insulin 4 times daily . 400 each 0    Continuous Blood Gluc Transmit (DEXCOM G6 TRANSMITTER) Does not apply Misc 1 each by Does not apply route every 3 (three) months. 1 each 1    ASCENSIA MICROFILL  TEST In Vitro Strip TEST SIX TIMES DAILY 200 Strip 2     Allergies:  Allergies   Allergen Reactions    Bee Venom        Objective:   Physical Exam  Vitals reviewed.   Constitutional:       General: He is not in acute distress.     Appearance: He is well-developed. He is not diaphoretic.   Eyes:      General: No scleral icterus.        Right eye: No discharge.         Left eye: No discharge.      Conjunctiva/sclera: Conjunctivae normal.   Cardiovascular:      Rate and Rhythm: Normal rate and regular rhythm.      Heart sounds: Normal heart sounds.   Pulmonary:      Effort: Pulmonary effort is normal. No respiratory distress.      Breath sounds: Normal breath sounds. No wheezing or rales.         Assessment & Plan:   1. Hypoxia    2. Wheezing    Get home oxygen  Continue current treatment.    1. Hypoxia  - CT ANGIOGRAPHY, CHEST (CPT=71275); Future    2. Wheezing  - CT ANGIOGRAPHY, CHEST (CPT=71275); Future    Meds This Visit:  Requested Prescriptions      No prescriptions requested or ordered in this encounter       Imaging & Referrals:  None

## 2024-02-26 ENCOUNTER — TELEPHONE (OUTPATIENT)
Dept: FAMILY MEDICINE CLINIC | Facility: CLINIC | Age: 59
End: 2024-02-26

## 2024-02-26 NOTE — TELEPHONE ENCOUNTER
Needs an overnight oximetry testing in order to qualify patient th through insurance.  If  authorizes she can give to patient self pay if they wish to go that route.  Please call

## 2024-03-01 ENCOUNTER — OFFICE VISIT (OUTPATIENT)
Dept: FAMILY MEDICINE CLINIC | Facility: CLINIC | Age: 59
End: 2024-03-01
Payer: COMMERCIAL

## 2024-03-01 VITALS
DIASTOLIC BLOOD PRESSURE: 70 MMHG | WEIGHT: 252 LBS | OXYGEN SATURATION: 94 % | BODY MASS INDEX: 35.28 KG/M2 | SYSTOLIC BLOOD PRESSURE: 128 MMHG | HEIGHT: 71 IN | HEART RATE: 66 BPM

## 2024-03-01 DIAGNOSIS — Z00.00 LABORATORY EXAMINATION ORDERED AS PART OF A COMPLETE PHYSICAL EXAMINATION: ICD-10-CM

## 2024-03-01 DIAGNOSIS — R09.02 HYPOXIA: ICD-10-CM

## 2024-03-01 DIAGNOSIS — J12.9 VIRAL PNEUMONIA: Primary | ICD-10-CM

## 2024-03-01 PROCEDURE — 3074F SYST BP LT 130 MM HG: CPT | Performed by: FAMILY MEDICINE

## 2024-03-01 PROCEDURE — 99213 OFFICE O/P EST LOW 20 MIN: CPT | Performed by: FAMILY MEDICINE

## 2024-03-01 PROCEDURE — 3078F DIAST BP <80 MM HG: CPT | Performed by: FAMILY MEDICINE

## 2024-03-01 PROCEDURE — 3008F BODY MASS INDEX DOCD: CPT | Performed by: FAMILY MEDICINE

## 2024-03-04 DIAGNOSIS — E10.9 TYPE 1 DIABETES MELLITUS WITHOUT COMPLICATION (HCC): ICD-10-CM

## 2024-03-05 RX ORDER — INSULIN LISPRO 100 [IU]/ML
150 INJECTION, SOLUTION INTRAVENOUS; SUBCUTANEOUS DAILY
Qty: 40 ML | Refills: 11 | Status: SHIPPED | OUTPATIENT
Start: 2024-03-05

## 2024-03-05 NOTE — TELEPHONE ENCOUNTER
A refill request was received for:  Requested Prescriptions     Pending Prescriptions Disp Refills    HUMALOG 100 UNIT/ML Injection Solution 40 mL 11     Si Units by Insulin pump route daily.       Last refill date:   2024    Last office visit: 3/1/2024    Patient comment: For some reason im going through more insulin then normal i have a appointment coming up to discuss this     Follow up due:  Future Appointments   Date Time Provider Department Center   4/10/2024  4:00 PM [facility]

## 2024-03-18 NOTE — PROGRESS NOTES
Subjective:   Patient ID: Montrell Emerson is a 58 year old male.    + viral pneumonia.  Cough mildly improving.  + fatigue.  + low oxygen, better then before but still low.  Unable to get oxygen at home yet.no CP.  No F/C.      History/Other:   Review of Systems   All other systems reviewed and are negative.    Current Outpatient Medications   Medication Sig Dispense Refill    HUMALOG 100 UNIT/ML Injection Solution 150 Units by Insulin pump route daily. 40 mL 11    predniSONE 20 MG Oral Tab 2.5 tab day 1-3, 2 tab day 4, 1.5 tab day 5, 1 tab day 6, 0.5 tab day 7 13 tablet 0    albuterol 108 (90 Base) MCG/ACT Inhalation Aero Soln Inhale 2 puffs into the lungs every 4 (four) hours as needed for Wheezing. 1 each 0    HYDROcodone-acetaminophen 5-325 MG Oral Tab Take 1 tablet by mouth every 6 (six) hours as needed for Pain. 12 tablet 0    calcitriol 0.25 MCG Oral Cap Take 1 capsule (0.25 mcg total) by mouth daily. 30 capsule 0    levothyroxine 200 MCG Oral Tab Take 1 tablet (200 mcg total) by mouth before breakfast. 90 tablet 0    semaglutide-weight management (WEGOVY) 1.7 MG/0.75ML Subcutaneous Solution Auto-injector Inject 0.75 mL (1.7 mg total) into the skin once a week. 4 each 0    Glucose Blood (CONTOUR NEXT TEST) In Vitro Strip Test 4-5 times daily 500 each 0    Insulin Pen Needle (PEN NEEDLES) 32G X 6 MM Does not apply Misc 1 each by Does not apply route 4 (four) times daily. Use to inject insulin 4 times daily . 400 each 0    Continuous Blood Gluc Transmit (DEXCOM G6 TRANSMITTER) Does not apply Misc 1 each by Does not apply route every 3 (three) months. 1 each 1    ASCENSIA MICROFILL TEST In Vitro Strip TEST SIX TIMES DAILY 200 Strip 2     Allergies:  Allergies   Allergen Reactions    Bee Venom        Objective:   Physical Exam  Vitals reviewed.   Constitutional:       General: He is not in acute distress.     Appearance: He is well-developed. He is not diaphoretic.   Eyes:      General: No scleral icterus.         Right eye: No discharge.         Left eye: No discharge.      Conjunctiva/sclera: Conjunctivae normal.   Cardiovascular:      Rate and Rhythm: Normal rate and regular rhythm.      Heart sounds: Normal heart sounds.   Pulmonary:      Effort: Pulmonary effort is normal. No respiratory distress.      Breath sounds: Normal breath sounds. No wheezing or rales.         Assessment & Plan:   1. Viral pneumonia    2. Hypoxia    3. Laboratory examination ordered as part of a complete physical examination      1. Viral pneumonia    2. Hypoxia  Work on getting oxygen    3. Laboratory examination ordered as part of a complete physical examination  - Lipid Panel  - CBC With Differential With Platelet  - Comp Metabolic Panel (14)  - TSH W Reflex To Free T4  - PSA - Total [5363][Q]  - VITAMIN D, 25-HYDROXY [65947][Q]      Orders Placed This Encounter   Procedures    Lipid Panel    CBC With Differential With Platelet    Comp Metabolic Panel (14)    TSH W Reflex To Free T4    PSA - Total [5363][Q]    VITAMIN D, 25-HYDROXY [44093][Q]       Meds This Visit:  Requested Prescriptions      No prescriptions requested or ordered in this encounter       Imaging & Referrals:  None

## 2024-03-27 LAB
ABSOLUTE BASOPHILS: 99 CELLS/UL (ref 0–200)
ABSOLUTE EOSINOPHILS: 342 CELLS/UL (ref 15–500)
ABSOLUTE LYMPHOCYTES: 1079 CELLS/UL (ref 850–3900)
ABSOLUTE MONOCYTES: 638 CELLS/UL (ref 200–950)
ABSOLUTE NEUTROPHILS: 3642 CELLS/UL (ref 1500–7800)
ALBUMIN/GLOBULIN RATIO: 1.7 (CALC) (ref 1–2.5)
ALBUMIN: 3.9 G/DL (ref 3.6–5.1)
ALKALINE PHOSPHATASE: 129 U/L (ref 35–144)
ALT: 34 U/L (ref 9–46)
AST: 23 U/L (ref 10–35)
BASOPHILS: 1.7 %
BILIRUBIN, TOTAL: 0.5 MG/DL (ref 0.2–1.2)
BUN/CREATININE RATIO: 26 (CALC) (ref 6–22)
BUN: 26 MG/DL (ref 7–25)
CALCIUM: 9.1 MG/DL (ref 8.6–10.3)
CARBON DIOXIDE: 29 MMOL/L (ref 20–32)
CHLORIDE: 106 MMOL/L (ref 98–110)
CHOL/HDLC RATIO: 3.9 (CALC)
CHOLESTEROL, TOTAL: 215 MG/DL
CREATININE: 1.01 MG/DL (ref 0.7–1.3)
EGFR: 87 ML/MIN/1.73M2
EOSINOPHILS: 5.9 %
GLOBULIN: 2.3 G/DL (CALC) (ref 1.9–3.7)
GLUCOSE: 258 MG/DL (ref 65–99)
HDL CHOLESTEROL: 55 MG/DL
HEMATOCRIT: 46.8 % (ref 38.5–50)
HEMOGLOBIN A1C: 8.2 % OF TOTAL HGB
HEMOGLOBIN: 15.2 G/DL (ref 13.2–17.1)
LDL-CHOLESTEROL: 138 MG/DL (CALC)
LYMPHOCYTES: 18.6 %
MCH: 27.7 PG (ref 27–33)
MCHC: 32.5 G/DL (ref 32–36)
MCV: 85.2 FL (ref 80–100)
MONOCYTES: 11 %
MPV: 10.8 FL (ref 7.5–12.5)
NEUTROPHILS: 62.8 %
NON-HDL CHOLESTEROL: 160 MG/DL (CALC)
PLATELET COUNT: 252 THOUSAND/UL (ref 140–400)
POTASSIUM: 4.8 MMOL/L (ref 3.5–5.3)
PROTEIN, TOTAL: 6.2 G/DL (ref 6.1–8.1)
PSA, TOTAL: 1.23 NG/ML
RDW: 13.1 % (ref 11–15)
RED BLOOD CELL COUNT: 5.49 MILLION/UL (ref 4.2–5.8)
SODIUM: 142 MMOL/L (ref 135–146)
TRIGLYCERIDES: 106 MG/DL
TSH W/REFLEX TO FT4: 1.01 MIU/L (ref 0.4–4.5)
VITAMIN D, 25-OH, TOTAL: 38 NG/ML (ref 30–100)
WHITE BLOOD CELL COUNT: 5.8 THOUSAND/UL (ref 3.8–10.8)

## 2024-04-10 ENCOUNTER — OFFICE VISIT (OUTPATIENT)
Dept: ENDOCRINOLOGY CLINIC | Facility: CLINIC | Age: 59
End: 2024-04-10

## 2024-04-10 VITALS
HEART RATE: 62 BPM | DIASTOLIC BLOOD PRESSURE: 75 MMHG | WEIGHT: 251 LBS | HEIGHT: 71 IN | BODY MASS INDEX: 35.14 KG/M2 | SYSTOLIC BLOOD PRESSURE: 148 MMHG

## 2024-04-10 DIAGNOSIS — E03.9 HYPOTHYROIDISM, UNSPECIFIED TYPE: ICD-10-CM

## 2024-04-10 DIAGNOSIS — E10.65 UNCONTROLLED TYPE 1 DIABETES MELLITUS WITH HYPERGLYCEMIA (HCC): Primary | ICD-10-CM

## 2024-04-10 DIAGNOSIS — E66.01 CLASS 2 SEVERE OBESITY DUE TO EXCESS CALORIES WITH SERIOUS COMORBIDITY AND BODY MASS INDEX (BMI) OF 35.0 TO 35.9 IN ADULT (HCC): ICD-10-CM

## 2024-04-10 DIAGNOSIS — E55.9 VITAMIN D DEFICIENCY: ICD-10-CM

## 2024-04-10 DIAGNOSIS — E78.5 HYPERLIPIDEMIA, UNSPECIFIED HYPERLIPIDEMIA TYPE: ICD-10-CM

## 2024-04-10 PROBLEM — E66.812 CLASS 2 SEVERE OBESITY DUE TO EXCESS CALORIES WITH SERIOUS COMORBIDITY AND BODY MASS INDEX (BMI) OF 35.0 TO 35.9 IN ADULT (HCC): Status: ACTIVE | Noted: 2022-08-31

## 2024-04-10 LAB
GLUCOSE BLOOD: 90
TEST STRIP LOT #: NORMAL NUMERIC

## 2024-04-10 RX ORDER — ATORVASTATIN CALCIUM 10 MG/1
5 TABLET, FILM COATED ORAL NIGHTLY
Qty: 45 TABLET | Refills: 0 | Status: SHIPPED | OUTPATIENT
Start: 2024-04-10 | End: 2024-07-09

## 2024-04-10 RX ORDER — SEMAGLUTIDE 1 MG/.5ML
1 INJECTION, SOLUTION SUBCUTANEOUS WEEKLY
Qty: 4 EACH | Refills: 0 | Status: SHIPPED | OUTPATIENT
Start: 2024-04-10

## 2024-04-10 NOTE — PROGRESS NOTES
Name: Montrell Emerson  Date: 4/10/24    Referring Physician: No ref. provider found    INITIAL VISIT  Montrell Emerson is a 58 year old male who had presented for evaluation and management of Type 1.5 diabetes that was diagnosed when he was in his 20s and hypothyroidism. At that visit, he had been having some complaints regarding his pump not connecting to the Guardian, and so subsequent to our visit, we changed him over to the Tandem pump. He has been doing much better on this, with less low blood sugars. In fact his blood sugars have been much better controlled.     I had also started him on Saxenda, and he had noted better blood sugars, but he was not able to tolerate the medication due to GI side effects. I then started him on Wegovy. He had been on the 2.4mg dose and doing well with this but then had to stop this prior to his surgery.     He had gotten sick recently, most likely with Covid.     Blood Glucose Today: 198  HbA1C or glycohemoglobin is 8.2 on 3/26/24 (and it was 7.2 on 8/28/23 and it was 7.1 on 4/12/23 and it was 6.8 on 1/11/23 and it was 7.6 on 8/31/22 and it was 7.7 on 1/05/22 and it was 8.5 % on 6/16/21and it was 8.0 % in 11/16/21)  Type 1 or Type 2?: Type 1.5    He has been checking blood sugar 5 times a day,and he uses the Dexcom now.     Medications for DM: He has the Tandem pump, Control IQ:  Basal rate- 5.00  ICR- 1:16  ISR- 1:40  Target- 110  AIT- 5hr    Current 2 weeks  Very High- 11%  High- 32%  Target- 56%  Low- 0.9%  Very Low- 0.1%    Dietary compliance: good:  Occasionally:  Breakfast- [employer], [employer]  Lunch- lunchables  Dinner- Fastfood ([employer] brings it home); beef stroganoff, tacos, chicken; no vegetables    Exercise: has time but does not  Has an apple watch    Polyuria/polydipsia: none    Blurred vision: none    Flu Vaccine This Season: yes, received Covid vaccine     REVIEW OF SYSTEMS  Eyes: Diabetic retinopathy present: none            Most recent visit to eye  doctor in last 12 months: saw recently    CV: Cardiovascular disease present: no         Hypertension present: no, but today elevated         Hyperlipidemia present: LDL elevated, on meds (3/26/24)         Peripheral Vascular Disease present: yes    : Nephropathy present: none (12/09/23); creatinine- 1.01    Neuro: Neuropathy present: yes, has this    Skin: Infection or ulceration: no     Osteoporosis: no; vitamin D- 38 (3/26/24)    Thyroid disease: yes, taking LT4- 200mcg; TSH- 1.01 (3/26/24)    Medications:     Current Outpatient Medications:     HUMALOG 100 UNIT/ML Injection Solution, 150 Units by Insulin pump route daily., Disp: 40 mL, Rfl: 11    levothyroxine 200 MCG Oral Tab, Take 1 tablet (200 mcg total) by mouth before breakfast., Disp: 90 tablet, Rfl: 0    predniSONE 20 MG Oral Tab, 2.5 tab day 1-3, 2 tab day 4, 1.5 tab day 5, 1 tab day 6, 0.5 tab day 7, Disp: 13 tablet, Rfl: 0    HYDROcodone-acetaminophen 5-325 MG Oral Tab, Take 1 tablet by mouth every 6 (six) hours as needed for Pain., Disp: 12 tablet, Rfl: 0    calcitriol 0.25 MCG Oral Cap, Take 1 capsule (0.25 mcg total) by mouth daily., Disp: 30 capsule, Rfl: 0    semaglutide-weight management (WEGOVY) 1.7 MG/0.75ML Subcutaneous Solution Auto-injector, Inject 0.75 mL (1.7 mg total) into the skin once a week., Disp: 4 each, Rfl: 0    Glucose Blood (CONTOUR NEXT TEST) In Vitro Strip, Test 4-5 times daily, Disp: 500 each, Rfl: 0    Insulin Pen Needle (PEN NEEDLES) 32G X 6 MM Does not apply Misc, 1 each by Does not apply route 4 (four) times daily. Use to inject insulin 4 times daily ., Disp: 400 each, Rfl: 0    Continuous Blood Gluc Transmit (DEXCOM G6 TRANSMITTER) Does not apply Misc, 1 each by Does not apply route every 3 (three) months., Disp: 1 each, Rfl: 1    ASCENSIA MICROFILL TEST In Vitro Strip, TEST SIX TIMES DAILY, Disp: 200 Strip, Rfl: 2     Allergies:   Allergies   Allergen Reactions    Bee Venom        Social History:   Social History      Socioeconomic History    Marital status:    Tobacco Use    Smoking status: Never    Smokeless tobacco: Never   Vaping Use    Vaping status: Never Used   Substance and Sexual Activity    Alcohol use: Not Currently     Comment: Rarely    Drug use: No   Other Topics Concern    Caffeine Concern Yes     Comment: soda 16oz/day    Exercise No       Medical History:   Past Medical History:    Calculus of kidney    Hearing impairment    [place]    High cholesterol    Personal history of urinary calculi    Pneumonia due to organism    Type 2 diabetes mellitus with hyperglycemia, with long-term current use of insulin ([facility])    Type I (juvenile type) diabetes mellitus without mention of complication, not stated as uncontrolled    Unspecified septicemia(038.9)   Hypothyroidism  Vitamin D Defeiciency    Surgical history:   Past Surgical History:   Procedure Laterality Date    Appendectomy      Colonoscopy  01/01/2008    Other      lithotripsy    Other surgical history      bilat knees torn miniscus    Other surgical history  10/30/2023    Parathyroidectomy    Thyroidectomy           PHYSICAL EXAM  Vitals:    04/10/24 1558   BP: (!) 174/70   Pulse: 65   Weight: 251 lb (113.9 kg)   Height: 5' 11\" (1.803 m)         General Appearance:  alert, well developed, in no acute distress  Eyes:  normal conjunctivae, sclera., normal sclera and normal pupils  Psychiatric:  oriented to time, self, and place  Nutritional:  no abnormal weight gain or loss    Lab Data:   Lab Results   Component Value Date     (H) 04/12/2022    A1C 8.2 (H) 03/26/2024     Lab Results   Component Value Date     (H) 03/26/2024    BUN 26 (H) 03/26/2024    BUNCREA 26 (H) 03/26/2024    CREATSERUM 1.01 03/26/2024    ANIONGAP 4 12/25/2021    GFR 88 07/23/2018    GFRNAA 80 01/07/2022    GFRAA 93 01/07/2022    CA 9.1 03/26/2024    OSMOCALC 298 (H) 12/25/2021    ALKPHO 129 03/26/2024    AST 23 03/26/2024    ALT 34 03/26/2024    BILT 0.5 03/26/2024     TP 6.2 03/26/2024    ALB 3.9 03/26/2024    GLOBULIN 2.3 03/26/2024    AGRATIO 1.7 03/26/2024     03/26/2024    K 4.8 03/26/2024     03/26/2024    CO2 29 03/26/2024     Lab Results   Component Value Date    CHOLEST 215 (H) 03/26/2024    TRIG 106 03/26/2024    HDL 55 03/26/2024     (H) 03/26/2024    VLDL 16 07/10/2021    TCHDLRATIO 3.9 03/26/2024    NONHDLC 160 (H) 03/26/2024     Lab Results   Component Value Date    MALBP 1.81 07/10/2021    CREUR 125.00 07/10/2021    MALBCRECALC 3.0 07/23/2018    MICROALBUMIN 0.6 12/09/2023    CREAUR 107 12/09/2023    MALBCREACALC 6 12/09/2023     ASSESSMENT/PLAN:    This is a 58 year-old man here for evaluation and management of uncontrolled  type 2 diabetes. We discussed the ABCs of DM. He also has hypothyroidism.    1.) Hyperglycemia Management- We discussed the importance of glycemic control to prevent complications of diabetes. We discussed the importance of SBGM.   - The new Tandem pump is working well for him, he is not having hypoglycemia; He had been having problems with his Dexcom and now that it is working properly, we hope that it will more accurately reflect his blood sugars  - Continue same settings for now      2.) Management of Diabetic Complications- We discussed the complications of diabetes include retinopathy, neuropathy, nephropathy and cardiovascular disease.   - up to date with flu vaccine, received Covid vaccine   - Ophtho up to date  - MAC- will check in 6 months  - Lipid panel- will check in 6 months (I have started him on atorvastatin 10mg to be taken every other day)  - CMP- will check in 6 months  - CAD- none  - BP- not at goal, on meds, will monitor  - Neuropathy- he has this    3.) Lifestyle Management for Diabetes- We discussed importance of a low CHO diet, and recommend 45gm per meal or 135gm per day. We discussed the importance of trying to follow a Mediterranean diet, with an emphasis on vegetables at every meal, with lots  whole grains, and protein from either plant-based sources, or poultry and fish.   - He is on the road a lot. He is eating better  - I asked him to try and make some time for exercise; maybe log steps everyday. He said that he would try    4.) Hypothyroidism  - Continue same dose for now (200mcg PO qday), check TSH and FT4 in one year    5.) Vitamin D deficiency  - At goal    6.) Primary Hyperparathyroidism- he is now s/p parathyroidectomy  - Check DEXA scan in 10/2025    7.) Obesity- I restarted patient on Wegovy 1.0mg    Return to clinic in 6 months    Prior to this encounter, I spent over 15 minutes with preparing for the visit, including reviewing documents from other specialties as well as from PCP and going over test results. During the face to face encounter, I spent an additional 15 minutes which were determined for follow-up. Greater than 50% of the time was spent in counseling, anticipatory guidance, and coordination of care. Patient concerns were answered to the best of my knowledge.       4/10/24  Melissa Ji

## 2024-04-29 ENCOUNTER — TELEPHONE (OUTPATIENT)
Dept: ENDOCRINOLOGY CLINIC | Facility: CLINIC | Age: 59
End: 2024-04-29

## 2024-04-29 NOTE — TELEPHONE ENCOUNTER
Fax received from [facility] asking for LOV note - OV note dtd 4/11/24 faxed to [facility] at [phone]  Spoke to patient to update - patient stated understanding and will f/u with [facility] tomorrow

## 2024-04-29 NOTE — TELEPHONE ENCOUNTER
Patient calling regards running out of diabetic supplies. Please call. States [facility] has been sending information over. Please call.

## 2024-04-30 NOTE — TELEPHONE ENCOUNTER
Per chart review: PA Case: [id number], Status: Approved, Coverage Starts on: 4/22/2024 12:00:00 AM, Coverage Ends on: 11/4/2024 12:00:00 AM.    MC sent updating patient and advising patient to call local pharmacies to find wegovy (if his pharmacy cannot provide medication)

## 2024-05-02 ENCOUNTER — TELEPHONE (OUTPATIENT)
Dept: ENDOCRINOLOGY CLINIC | Facility: CLINIC | Age: 59
End: 2024-05-02

## 2024-05-02 NOTE — TELEPHONE ENCOUNTER
Patient states prior auth is needed for Dexcom G6 sensors and Tandem Pump Supplies.  Please call [phone].  For additional questions please call patient.  Thank you.

## 2024-05-06 ENCOUNTER — PATIENT MESSAGE (OUTPATIENT)
Dept: ENDOCRINOLOGY CLINIC | Facility: CLINIC | Age: 59
End: 2024-05-06

## 2024-05-07 ENCOUNTER — MED REC SCAN ONLY (OUTPATIENT)
Dept: ENDOCRINOLOGY CLINIC | Facility: CLINIC | Age: 59
End: 2024-05-07

## 2024-05-07 NOTE — TELEPHONE ENCOUNTER
Followed up with rep [employer], discrepancy with pt's birthday. Per rep, to complete new CMN and fax to him.     New order for G6 and syringes w/ needle completed and faxed to Dar [phone].       Per Dar, \"we've got everything for the pump supplies and are working to ship that today\".     [facility] message sent to pt notifying of form completion.

## 2024-05-07 NOTE — TELEPHONE ENCOUNTER
Received form from [facility] for Syringe w/neeedle insulin 3cc.     Form signed by Dr. Ji and faxed back to [employer] at [phone].

## 2024-06-10 DIAGNOSIS — E66.01 CLASS 2 SEVERE OBESITY DUE TO EXCESS CALORIES WITH SERIOUS COMORBIDITY AND BODY MASS INDEX (BMI) OF 35.0 TO 35.9 IN ADULT (HCC): ICD-10-CM

## 2024-06-12 NOTE — TELEPHONE ENCOUNTER
Unable to refill per protocol. Protocol not yet in place for weight loss medications.     LOV 4/10/24  RTC 6 months  F/U scheduled 7/10/24  Last fill 4/10/24  Confirmed dose per chart review.

## 2024-06-15 DIAGNOSIS — E10.9 TYPE 1 DIABETES MELLITUS WITHOUT COMPLICATION (HCC): ICD-10-CM

## 2024-06-15 DIAGNOSIS — E66.01 CLASS 2 SEVERE OBESITY DUE TO EXCESS CALORIES WITH SERIOUS COMORBIDITY AND BODY MASS INDEX (BMI) OF 35.0 TO 35.9 IN ADULT (HCC): Primary | ICD-10-CM

## 2024-06-16 RX ORDER — SEMAGLUTIDE 1 MG/.5ML
0.5 INJECTION, SOLUTION SUBCUTANEOUS WEEKLY
Qty: 6 ML | Refills: 1 | OUTPATIENT
Start: 2024-06-16

## 2024-06-16 RX ORDER — SEMAGLUTIDE 1.7 MG/.75ML
1.7 INJECTION, SOLUTION SUBCUTANEOUS WEEKLY
Qty: 4 EACH | Refills: 0 | Status: SHIPPED | OUTPATIENT
Start: 2024-06-16 | End: 2024-07-16

## 2024-06-17 RX ORDER — INSULIN LISPRO 100 [IU]/ML
150 INJECTION, SOLUTION INTRAVENOUS; SUBCUTANEOUS DAILY
Qty: 40 ML | Refills: 11 | Status: SHIPPED | OUTPATIENT
Start: 2024-06-17

## 2024-06-17 NOTE — TELEPHONE ENCOUNTER
Endocrine Refill protocol for oral and injectable diabetic medications    Protocol Criteria:pass    -Appointment with Endocrinology completed in the last 6 months or scheduled in the next 3 months    -A1c result <8.5% in the past 6 months      Verify the above has been completed or scheduled in the appropriate timeline. If so can send a 90 day supply with 1 refill.     Last completed office visit: 4/10/2024 Melissa Ji MD   Next scheduled Follow up: 07/10/24     Last A1c result: Last A1c value was 8.2% done 3/26/2024.

## 2024-06-18 RX ORDER — SEMAGLUTIDE 1 MG/.5ML
1 INJECTION, SOLUTION SUBCUTANEOUS WEEKLY
Qty: 4 EACH | Refills: 0 | OUTPATIENT
Start: 2024-06-18

## 2024-07-02 ENCOUNTER — TELEPHONE (OUTPATIENT)
Dept: FAMILY MEDICINE CLINIC | Facility: CLINIC | Age: 59
End: 2024-07-02

## 2024-07-02 RX ORDER — BLOOD SUGAR DIAGNOSTIC
200 STRIP MISCELLANEOUS 3 TIMES DAILY
COMMUNITY
End: 2024-07-02

## 2024-07-02 RX ORDER — BLOOD SUGAR DIAGNOSTIC
200 STRIP MISCELLANEOUS 3 TIMES DAILY
Qty: 300 STRIP | Refills: 1 | Status: SHIPPED | OUTPATIENT
Start: 2024-07-02

## 2024-07-07 LAB
ALBUMIN/GLOBULIN RATIO: 1.9 (CALC) (ref 1–2.5)
ALBUMIN: 3.9 G/DL (ref 3.6–5.1)
ALKALINE PHOSPHATASE: 114 U/L (ref 35–144)
ALT: 12 U/L (ref 9–46)
AST: 12 U/L (ref 10–35)
BILIRUBIN, TOTAL: 0.8 MG/DL (ref 0.2–1.2)
BUN: 24 MG/DL (ref 7–25)
CALCIUM: 9.2 MG/DL (ref 8.6–10.3)
CARBON DIOXIDE: 30 MMOL/L (ref 20–32)
CHLORIDE: 105 MMOL/L (ref 98–110)
CHOL/HDLC RATIO: 3.5 (CALC)
CHOLESTEROL, TOTAL: 193 MG/DL
CREATININE, RANDOM URINE: 161 MG/DL (ref 20–320)
CREATININE: 0.9 MG/DL (ref 0.7–1.3)
EGFR: 100 ML/MIN/1.73M2
GLOBULIN: 2.1 G/DL (CALC) (ref 1.9–3.7)
GLUCOSE: 169 MG/DL (ref 65–99)
HDL CHOLESTEROL: 55 MG/DL
LDL-CHOLESTEROL: 117 MG/DL (CALC)
MICROALBUMIN/CREATININE RATIO, RANDOM URINE: 9 MG/G CREAT
MICROALBUMIN: 1.5 MG/DL
NON-HDL CHOLESTEROL: 138 MG/DL (CALC)
POTASSIUM: 4.5 MMOL/L (ref 3.5–5.3)
PROTEIN, TOTAL: 6 G/DL (ref 6.1–8.1)
SODIUM: 141 MMOL/L (ref 135–146)
T4, FREE: 1.4 NG/DL (ref 0.8–1.8)
TRIGLYCERIDES: 99 MG/DL
TSH: 0.49 MIU/L (ref 0.4–4.5)
VITAMIN D, 25-OH, TOTAL: 33 NG/ML (ref 30–100)

## 2024-07-30 ENCOUNTER — NURSE ONLY (OUTPATIENT)
Dept: AUDIOLOGY | Facility: CLINIC | Age: 59
End: 2024-07-30

## 2024-07-30 ENCOUNTER — OFFICE VISIT (OUTPATIENT)
Dept: OTOLARYNGOLOGY | Facility: CLINIC | Age: 59
End: 2024-07-30
Payer: COMMERCIAL

## 2024-07-30 DIAGNOSIS — H90.3 SENSORINEURAL HEARING LOSS (SNHL) OF BOTH EARS: ICD-10-CM

## 2024-07-30 DIAGNOSIS — H91.8X3 ASYMMETRICAL HEARING LOSS: ICD-10-CM

## 2024-07-30 DIAGNOSIS — H90.3 ASYMMETRICAL SENSORINEURAL HEARING LOSS: Primary | ICD-10-CM

## 2024-07-30 DIAGNOSIS — H91.90 HEARING LOSS, UNSPECIFIED HEARING LOSS TYPE, UNSPECIFIED LATERALITY: Primary | ICD-10-CM

## 2024-07-30 PROCEDURE — 99213 OFFICE O/P EST LOW 20 MIN: CPT | Performed by: STUDENT IN AN ORGANIZED HEALTH CARE EDUCATION/TRAINING PROGRAM

## 2024-07-30 NOTE — PROGRESS NOTES
Montrell Emerson is a 58 year old male.   Chief Complaint   Patient presents with    Ear Problem     Left ear hearing issues, ears feels full, denies any pain today. Has been for a while now     HPI:   58-year-old presents with longstanding left-sided hearing loss.  Reports loud noise exposure with machines although more so on the right side    Current Outpatient Medications   Medication Sig Dispense Refill    semaglutide-weight management (WEGOVY) 2.4 MG/0.75ML Subcutaneous Solution Auto-injector Inject 0.75 mL (2.4 mg total) into the skin once a week. 4 each 11    Glucose Blood (ONETOUCH VERIO) In Vitro Strip 200 strips by In Vitro route in the morning, at noon, and at bedtime. 300 strip 1    Glucose Blood (CONTOUR NEXT TEST) In Vitro Strip Test 4-5 times daily 500 each 0    HUMALOG 100 UNIT/ML Injection Solution 150 Units by Insulin pump route daily. 40 mL 11    predniSONE 20 MG Oral Tab 2.5 tab day 1-3, 2 tab day 4, 1.5 tab day 5, 1 tab day 6, 0.5 tab day 7 13 tablet 0    HYDROcodone-acetaminophen 5-325 MG Oral Tab Take 1 tablet by mouth every 6 (six) hours as needed for Pain. 12 tablet 0    calcitriol 0.25 MCG Oral Cap Take 1 capsule (0.25 mcg total) by mouth daily. 30 capsule 0    levothyroxine 200 MCG Oral Tab Take 1 tablet (200 mcg total) by mouth before breakfast. 90 tablet 0    Insulin Pen Needle (PEN NEEDLES) 32G X 6 MM Does not apply Misc 1 each by Does not apply route 4 (four) times daily. Use to inject insulin 4 times daily . 400 each 0    Continuous Blood Gluc Transmit (DEXCOM G6 TRANSMITTER) Does not apply Misc 1 each by Does not apply route every 3 (three) months. 1 each 1    ASCENSIA MICROFILL TEST In Vitro Strip TEST SIX TIMES DAILY 200 Strip 2      Past Medical History:    Calculus of kidney    Hearing impairment    [place] in L ear    High cholesterol    Personal history of urinary calculi    Pneumonia due to organism    Type 2 diabetes mellitus with hyperglycemia, with long-term current use of  insulin (HCC)    Type I (juvenile type) diabetes mellitus without mention of complication, not stated as uncontrolled    Unspecified septicemia(038.9)      Social History:  Social History     Socioeconomic History    Marital status:    Tobacco Use    Smoking status: Never    Smokeless tobacco: Never   Vaping Use    Vaping status: Never Used   Substance and Sexual Activity    Alcohol use: Not Currently     Comment: Rarely    Drug use: No   Other Topics Concern    Caffeine Concern Yes     Comment: soda 16oz/day    Exercise No      Past Surgical History:   Procedure Laterality Date    Appendectomy      Colonoscopy  01/01/2008    Other      lithotripsy    Other surgical history      bilat knees torn miniscus    Other surgical history  10/30/2023    Parathyroidectomy    Thyroidectomy           EXAM:   There were no vitals taken for this visit.    System Details   Skin Inspection - Normal.   Constitutional Overall appearance - Normal.   Head/Face Symmetric, TMJ tenderness not present    Eyes EOMI, PERRL   Right ear:  Canal clear, TM intact, no ROLY   Left ear:  Canal clear, TM intact, no ROLY   Nose: Septum midline, inferior turbinates not enlarged, nasal valves without collapse    Oral cavity/Oropharynx: No lesions or masses on inspection or palpation, tonsils symmetric    Neck: Soft without LAD, thyroid not enlarged  Voice clear/ no stridor   Other:      SCOPES AND PROCEDURES:         AUDIOGRAM AND IMAGING:     Is an asymmetric sensorineural hearing loss worse in the left ear in the low and mid frequencies.  Has a dip in both ears around 2000 to 4000 Hz.  Normal tympanograms.  Word score of 68% on the left and 96% on the right    IMPRESSION:   1. Hearing loss, unspecified hearing loss type, unspecified laterality  - Audiology Referral - [facility] ([facility])    2. Asymmetrical hearing loss    3. Sensorineural hearing loss (SNHL) of both ears       Recommendations:  -Went over his audiogram in detail.  Has an  asymmetric sensorineural hearing loss worse in the left ear at the low and mid frequencies  -This has been a longstanding issue he wishes to currently defer MRI looking at the internal auditory canals and may pursue this in the future if his hearing continues to worsen  -Discussed hearing aids as an option we will submit a benefit check for this    The patient indicates understanding of these issues and agrees to the plan.      Hugo Douglas MD  7/30/2024  5:11 PM

## 2024-09-04 ENCOUNTER — TELEPHONE (OUTPATIENT)
Dept: ENDOCRINOLOGY CLINIC | Facility: CLINIC | Age: 59
End: 2024-09-04

## 2024-09-04 ENCOUNTER — OFFICE VISIT (OUTPATIENT)
Dept: ENDOCRINOLOGY CLINIC | Facility: CLINIC | Age: 59
End: 2024-09-04

## 2024-09-04 VITALS
WEIGHT: 251 LBS | HEIGHT: 70 IN | SYSTOLIC BLOOD PRESSURE: 153 MMHG | BODY MASS INDEX: 35.93 KG/M2 | DIASTOLIC BLOOD PRESSURE: 77 MMHG | HEART RATE: 73 BPM

## 2024-09-04 DIAGNOSIS — E78.5 HYPERLIPIDEMIA, UNSPECIFIED HYPERLIPIDEMIA TYPE: ICD-10-CM

## 2024-09-04 DIAGNOSIS — E55.9 VITAMIN D DEFICIENCY: ICD-10-CM

## 2024-09-04 DIAGNOSIS — E03.9 HYPOTHYROIDISM, UNSPECIFIED TYPE: ICD-10-CM

## 2024-09-04 DIAGNOSIS — E10.65 UNCONTROLLED TYPE 1 DIABETES MELLITUS WITH HYPERGLYCEMIA (HCC): Primary | ICD-10-CM

## 2024-09-04 LAB
GLUCOSE BLOOD: 138
HEMOGLOBIN A1C: 7.6 % (ref 4.3–5.6)
TEST STRIP LOT #: NORMAL NUMERIC

## 2024-09-04 PROCEDURE — 3078F DIAST BP <80 MM HG: CPT | Performed by: INTERNAL MEDICINE

## 2024-09-04 PROCEDURE — 3008F BODY MASS INDEX DOCD: CPT | Performed by: INTERNAL MEDICINE

## 2024-09-04 PROCEDURE — 99214 OFFICE O/P EST MOD 30 MIN: CPT | Performed by: INTERNAL MEDICINE

## 2024-09-04 PROCEDURE — 3077F SYST BP >= 140 MM HG: CPT | Performed by: INTERNAL MEDICINE

## 2024-09-04 PROCEDURE — 3051F HG A1C>EQUAL 7.0%<8.0%: CPT | Performed by: INTERNAL MEDICINE

## 2024-09-04 PROCEDURE — 83036 HEMOGLOBIN GLYCOSYLATED A1C: CPT | Performed by: INTERNAL MEDICINE

## 2024-09-04 PROCEDURE — 3061F NEG MICROALBUMINURIA REV: CPT | Performed by: INTERNAL MEDICINE

## 2024-09-04 PROCEDURE — 82947 ASSAY GLUCOSE BLOOD QUANT: CPT | Performed by: INTERNAL MEDICINE

## 2024-09-04 RX ORDER — ATORVASTATIN CALCIUM 10 MG/1
10 TABLET, FILM COATED ORAL NIGHTLY
Qty: 90 TABLET | Refills: 3 | Status: SHIPPED | OUTPATIENT
Start: 2024-09-04

## 2024-09-04 NOTE — PROGRESS NOTES
Name: Montrell Emerson  Date: 9/04/24    Referring Physician: No ref. provider found    INITIAL VISIT  Montrell Emerson is a 58 year old male who had presented for evaluation and management of Type 1.5 diabetes that was diagnosed when he was in his 20s and hypothyroidism. At that visit, he had been having some complaints regarding his pump not connecting to the Guardian, and so subsequent to our visit, we changed him over to the Tandem pump. He has been doing much better on this, with less low blood sugars. In fact his blood sugars have been much better controlled.     I had also started him on Saxenda, and he had noted better blood sugars, but he was not able to tolerate the medication due to GI side effects. I then started him on Wegovy. He had been on the 2.4mg dose and doing well with this but then had to stop this prior to his surgery.         Blood Glucose Today: 136  HbA1C or glycohemoglobin is 7.6 today (and it was 8.2 on 3/26/24 and it was 7.2 on 8/28/23 and it was 7.1 on 4/12/23 and it was 6.8 on 1/11/23 and it was 7.6 on 8/31/22 and it was 7.7 on 1/05/22 and it was 8.5 % on 6/16/21 and it was 8.0 % in 11/16/21)  Type 1 or Type 2?: Type 1.5    He has been checking blood sugar 5 times a day,and he uses the Dexcom now.    -----------------------------  Dexcom Clarity  -----------------------------  Montrell Emerson  YOB: 1965  Generated at: Wed, Sep 4, 2024 4:21 PM CDT  Reporting period: Thu Aug 22, 2024 - Wed Sep 4, 2024  -----------------------------  Glucose Details  Average glucose: 181 mg/dL  Standard deviation: 63 mg/dL  GMI: 7.6%  -----------------------------  Time in Range  Very High: 15%  High: 30%  In Range: 55%  Low: 0%  Very Low: <1%     Target Range   mg/dL     -----------------------------  CGM Details  Sensor usage: 100%  Days with CGM data: 14/14     Medications for DM: He has the Tandem pump, Control IQ:  Basal rate- 5.00  ICR- 1:16  ISR- 1:40  Target- 110  AIT-  5hr    Current 2 weeks  Very High- 11%  High- 32%  Target- 56%  Low- 0.9%  Very Low- 0.1%    Dietary compliance: good:  Occasionally:  Breakfast- [employer], [employer]  Lunch- lunchables  Dinner- Fastfood ([employer] brings it home); beef stroganoff, tacos, chicken; no vegetables    Exercise: has time but does not  Has an apple watch    Polyuria/polydipsia: none    Blurred vision: none    Flu Vaccine This Season: yes, received Covid vaccine     REVIEW OF SYSTEMS  Eyes: Diabetic retinopathy present: none            Most recent visit to eye doctor in last 12 months: saw recently    CV: Cardiovascular disease present: no         Hypertension present: no, but today elevated         Hyperlipidemia present: LDL elevated, not on meds (7/06/24)         Peripheral Vascular Disease present: yes    : Nephropathy present: none (7/06/24); creatinine- 1.01    Neuro: Neuropathy present: yes, has this    Skin: Infection or ulceration: no     Osteoporosis: no; vitamin D- 33 (7/06/24)    Thyroid disease: yes, taking LT4- 200mcg; TSH- 0.49 (7/06/24)    Medications:     Current Outpatient Medications:     semaglutide-weight management (WEGOVY) 2.4 MG/0.75ML Subcutaneous Solution Auto-injector, Inject 0.75 mL (2.4 mg total) into the skin once a week., Disp: 4 each, Rfl: 11    HUMALOG 100 UNIT/ML Injection Solution, 150 Units by Insulin pump route daily., Disp: 40 mL, Rfl: 11    levothyroxine 200 MCG Oral Tab, Take 1 tablet (200 mcg total) by mouth before breakfast., Disp: 90 tablet, Rfl: 0    Glucose Blood (ONETOUCH VERIO) In Vitro Strip, 200 strips by In Vitro route in the morning, at noon, and at bedtime., Disp: 300 strip, Rfl: 1    Glucose Blood (CONTOUR NEXT TEST) In Vitro Strip, Test 4-5 times daily, Disp: 500 each, Rfl: 0    predniSONE 20 MG Oral Tab, 2.5 tab day 1-3, 2 tab day 4, 1.5 tab day 5, 1 tab day 6, 0.5 tab day 7, Disp: 13 tablet, Rfl: 0    HYDROcodone-acetaminophen 5-325 MG Oral Tab, Take 1 tablet by mouth every 6 (six)  hours as needed for Pain., Disp: 12 tablet, Rfl: 0    calcitriol 0.25 MCG Oral Cap, Take 1 capsule (0.25 mcg total) by mouth daily., Disp: 30 capsule, Rfl: 0    Insulin Pen Needle (PEN NEEDLES) 32G X 6 MM Does not apply Misc, 1 each by Does not apply route 4 (four) times daily. Use to inject insulin 4 times daily ., Disp: 400 each, Rfl: 0    Continuous Blood Gluc Transmit (DEXCOM G6 TRANSMITTER) Does not apply Misc, 1 each by Does not apply route every 3 (three) months., Disp: 1 each, Rfl: 1    ASCENSIA MICROFILL TEST In Vitro Strip, TEST SIX TIMES DAILY, Disp: 200 Strip, Rfl: 2     Allergies:   Allergies   Allergen Reactions    Bee Venom        Social History:   Social History     Socioeconomic History    Marital status:    Tobacco Use    Smoking status: Never    Smokeless tobacco: Never   Vaping Use    Vaping status: Never Used   Substance and Sexual Activity    Alcohol use: Not Currently     Comment: Rarely    Drug use: No   Other Topics Concern    Caffeine Concern Yes     Comment: soda 16oz/day    Exercise No       Medical History:   Past Medical History:    Calculus of kidney    Hearing impairment    [place] in [place] ear    High cholesterol    Personal history of urinary calculi    Pneumonia due to organism    Type 2 diabetes mellitus with hyperglycemia, with long-term current use of insulin ([facility])    Type I (juvenile type) diabetes mellitus without mention of complication, not stated as uncontrolled    Unspecified septicemia(038.9)     Surgical history:   Past Surgical History:   Procedure Laterality Date    Appendectomy      Colonoscopy  01/01/2008    Other      lithotripsy    Other surgical history      bilat knees torn miniscus    Other surgical history  10/30/2023    Parathyroidectomy    Thyroidectomy           PHYSICAL EXAM  Vitals:    09/04/24 1615   BP: 157/79   Pulse: 73   Weight: 251 lb (113.9 kg)   Height: 5' 10\" (1.778 m)           General Appearance:  alert, well developed, in no acute  distress  Eyes:  normal conjunctivae, sclera., normal sclera and normal pupils  Psychiatric:  oriented to time, self, and place  Nutritional:  no abnormal weight gain or loss    Lab Data:   Lab Results   Component Value Date     (H) 04/12/2022    A1C 8.2 (H) 03/26/2024     Lab Results   Component Value Date     (H) 07/06/2024    BUN 24 07/06/2024    BUNCREA SEE NOTE: 07/06/2024    CREATSERUM 0.90 07/06/2024    ANIONGAP 4 12/25/2021    GFR 88 07/23/2018    GFRNAA 80 01/07/2022    GFRAA 93 01/07/2022    CA 9.2 07/06/2024    OSMOCALC 298 (H) 12/25/2021    ALKPHO 114 07/06/2024    AST 12 07/06/2024    ALT 12 07/06/2024    BILT 0.8 07/06/2024    TP 6.0 (L) 07/06/2024    ALB 3.9 07/06/2024    GLOBULIN 2.1 07/06/2024    AGRATIO 1.9 07/06/2024     07/06/2024    K 4.5 07/06/2024     07/06/2024    CO2 30 07/06/2024     Lab Results   Component Value Date    CHOLEST 193 07/06/2024    TRIG 99 07/06/2024    HDL 55 07/06/2024     (H) 07/06/2024    VLDL 16 07/10/2021    TCHDLRATIO 3.5 07/06/2024    NONHDLC 138 (H) 07/06/2024     Lab Results   Component Value Date    MALBP 1.81 07/10/2021    CREUR 125.00 07/10/2021    MALBCRECALC 3.0 07/23/2018    MICROALBUMIN 1.5 07/06/2024    CREAUR 161 07/06/2024    MALBCREACALC 9 07/06/2024     ASSESSMENT/PLAN:    This is a 58 year-old man here for evaluation and management of uncontrolled  type 2 diabetes. We discussed the ABCs of DM. He also has hypothyroidism.    1.) Hyperglycemia Management- We discussed the importance of glycemic control to prevent complications of diabetes. We discussed the importance of SBGM.   - The new Tandem pump is working well for him, he is not having hypoglycemia;   - Continue same settings for now  - Prescribe the Dexcom 7      2.) Management of Diabetic Complications- We discussed the complications of diabetes include retinopathy, neuropathy, nephropathy and cardiovascular disease.   - up to date with flu vaccine, received Covid  vaccine   - Ophtho up to date  - MAC- will check in 3 months  - Lipid panel- will check in 3 months (I have started him on atorvastatin 10mg to be taken every day)  - CMP- will check in 3 months  - CAD- none  - BP- not at goal, on meds, will monitor  - Neuropathy- he has this    3.) Lifestyle Management for Diabetes- We discussed importance of a low CHO diet, and recommend 45gm per meal or 135gm per day. We discussed the importance of trying to follow a Mediterranean diet, with an emphasis on vegetables at every meal, with lots whole grains, and protein from either plant-based sources, or poultry and fish.   - He is on the road a lot. He is eating better  - I asked him to try and make some time for exercise; maybe log steps everyday. He said that he would try    4.) Hypothyroidism  - Continue same dose for now (200mcg PO qday), check TSH and FT4 in one year    5.) Vitamin D deficiency  - At goal    6.) Primary Hyperparathyroidism- he is now s/p parathyroidectomy  - Check DEXA scan in 10/2025    7.) Obesity- He is now on Wegovy 2.4mg    Return to clinic in 3 months    Prior to this encounter, I spent over 15 minutes with preparing for the visit, including reviewing documents from other specialties as well as from PCP and going over test results. During the face to face encounter, I spent an additional 15 minutes which were determined for follow-up. Greater than 50% of the time was spent in counseling, anticipatory guidance, and coordination of care. Patient concerns were answered to the best of my knowledge.       9/04/24  Melissa Ji  Prescribe G7 dexcom

## 2024-09-04 NOTE — PROGRESS NOTES
-----------------------------  Dexcom Clarity  -----------------------------  Montrell Emerson  YOB: 1965  Generated at: Wed, Sep 4, 2024 4:21 PM CDT  Reporting period: Thu Aug 22, 2024 - Wed Sep 4, 2024  -----------------------------  Glucose Details  Average glucose: 181 mg/dL  Standard deviation: 63 mg/dL  GMI: 7.6%  -----------------------------  Time in Range  Very High: 15%  High: 30%  In Range: 55%  Low: 0%  Very Low: <1%    Target Range   mg/dL    -----------------------------  CGM Details  Sensor usage: 100%  Days with CGM data: 14/14

## 2024-09-05 RX ORDER — BLOOD-GLUCOSE SENSOR
1 EACH MISCELLANEOUS
Qty: 9 EACH | Refills: 0 | Status: SHIPPED | OUTPATIENT
Start: 2024-09-05

## 2024-09-16 ENCOUNTER — TELEPHONE (OUTPATIENT)
Dept: ENDOCRINOLOGY CLINIC | Facility: CLINIC | Age: 59
End: 2024-09-16

## 2024-09-16 NOTE — TELEPHONE ENCOUNTER
Continuous Glucose Sensor (DEXCOM G7 SENSOR) Does not apply Misc, 1 each Every 10 days. Use as directed every 10 days, Disp: 9 each, Rfl: 0  KEY:[id number]

## 2024-09-17 NOTE — TELEPHONE ENCOUNTER
Medication PA Requested: DEXCOM G7 SENSOR                                                      CoverMyMeds Used:  Key:  Quantity: 9  Day Supply: 90  Si each Every 10 days.   DX Code:  E10.65

## 2024-09-24 NOTE — TELEPHONE ENCOUNTER
Medication PA Requested: DEXCOM G7 SENSOR                                                      CoverMyMeds Used: Yes  Key: [id number]  Quantity: 9  Day Supply: 90  Si each Every 10 days.   DX Code:  E10.65              Cover My Meds submitted, last office visit  and A1C   Awaiting determination

## 2024-09-25 NOTE — TELEPHONE ENCOUNTER
Received fax from [place] [facility] dated on 9/24/24 stating the request  dexcom g7 sensor has been approved effective 9/24/24 until 9/24/25. This approval means that, based on the info given the medication is considered medically necessary ans its no excluded under your plan.  Approval sent to scanning mcm sent  REF#[id number]

## 2024-09-29 DIAGNOSIS — E10.65 UNCONTROLLED TYPE 1 DIABETES MELLITUS WITH HYPERGLYCEMIA (HCC): ICD-10-CM

## 2024-09-30 RX ORDER — ACYCLOVIR 400 MG/1
1 TABLET ORAL
Qty: 9 EACH | Refills: 1 | Status: SHIPPED | OUTPATIENT
Start: 2024-09-30

## 2024-09-30 NOTE — TELEPHONE ENCOUNTER
Endocrine Refill protocol for Glucose testing supplies     Protocol Criteria: PASSED Reason: N/A    If below requirement is met, send a 90-day supply with 1 refill per provider protocol.    Verify appointment with Endocrinology completed in the last 6 months or scheduled in the next 3 months.    Last completed office visit: 9/4/2024 Melissa Ji MD   Next scheduled Follow up:   Future Appointments   Date Time Provider Department Center   12/4/2024  4:30 PM Melissa Ji MD [facility]

## 2024-11-13 DIAGNOSIS — E03.9 HYPOTHYROIDISM, UNSPECIFIED TYPE: ICD-10-CM

## 2024-11-15 RX ORDER — LEVOTHYROXINE SODIUM 200 UG/1
200 TABLET ORAL
Qty: 90 TABLET | Refills: 1 | Status: SHIPPED | OUTPATIENT
Start: 2024-11-15

## 2024-11-15 NOTE — TELEPHONE ENCOUNTER
Endocrine refill protocol for medications for hypothyroidism and hyperthyroidism    Protocol Criteria:  PASSED     If all below requirements are met, send a 90-day supply with 1 refill per provider protocol.    Verify appointment with Endocrinology completed in the last 12 months or scheduled in the next 6 months.    Normal TSH result in the past 12 months   Review recent telephone encounters and [employer] communications with patient to ensure a dose change has not occurred since last office visit that was not updated in the medication history list     Last completed office visit:9/4/2024 Melissa Ji MD   Next scheduled Follow up:   Future Appointments   Date Time Provider Department Center   12/4/2024  4:30 PM Melissa Ji MD [facility]      Last TSH result:   TSH   Date Value Ref Range Status   07/06/2024 0.49 0.40 - 4.50 mIU/L Final       Called and spoke to patient, reminded patient to have labs done prior to upcoming appointment. Patient verbalized understanding. [facility] message sent to patient attached with lab orders as patient gets labs done through [facility].

## 2024-11-27 LAB
ALBUMIN/GLOBULIN RATIO: 2 (CALC) (ref 1–2.5)
ALBUMIN: 4 G/DL (ref 3.6–5.1)
ALKALINE PHOSPHATASE: 145 U/L (ref 35–144)
ALT: 16 U/L (ref 9–46)
AST: 15 U/L (ref 10–35)
BILIRUBIN, TOTAL: 0.6 MG/DL (ref 0.2–1.2)
BUN/CREATININE RATIO: 26 (CALC) (ref 6–22)
BUN: 26 MG/DL (ref 7–25)
CALCIUM: 9.6 MG/DL (ref 8.6–10.3)
CARBON DIOXIDE: 26 MMOL/L (ref 20–32)
CHLORIDE: 104 MMOL/L (ref 98–110)
CHOL/HDLC RATIO: 3.7 (CALC)
CHOLESTEROL, TOTAL: 198 MG/DL
CREATININE, RANDOM URINE: 123 MG/DL (ref 20–320)
CREATININE: 1 MG/DL (ref 0.7–1.3)
EGFR: 87 ML/MIN/1.73M2
GLOBULIN: 2 G/DL (CALC) (ref 1.9–3.7)
GLUCOSE: 224 MG/DL (ref 65–99)
HDL CHOLESTEROL: 53 MG/DL
LDL-CHOLESTEROL: 128 MG/DL (CALC)
MICROALBUMIN/CREATININE RATIO, RANDOM URINE: 8 MG/G CREAT
MICROALBUMIN: 1 MG/DL
NON-HDL CHOLESTEROL: 145 MG/DL (CALC)
PARATHYROID HORMONE,$INTACT: 29 PG/ML (ref 16–77)
POTASSIUM: 4.3 MMOL/L (ref 3.5–5.3)
PROTEIN, TOTAL: 6 G/DL (ref 6.1–8.1)
SODIUM: 139 MMOL/L (ref 135–146)
T3, FREE: 3.2 PG/ML (ref 2.3–4.2)
T4, FREE: 1.3 NG/DL (ref 0.8–1.8)
TRIGLYCERIDES: 74 MG/DL
TSH: 0.24 MIU/L (ref 0.4–4.5)
VITAMIN B12: 286 PG/ML (ref 200–1100)
VITAMIN D, 25-OH, TOTAL: 30 NG/ML (ref 30–100)

## 2024-12-03 ENCOUNTER — TELEPHONE (OUTPATIENT)
Dept: ENDOCRINOLOGY CLINIC | Facility: CLINIC | Age: 59
End: 2024-12-03

## 2024-12-03 DIAGNOSIS — E66.812 CLASS 2 SEVERE OBESITY DUE TO EXCESS CALORIES WITH SERIOUS COMORBIDITY AND BODY MASS INDEX (BMI) OF 35.0 TO 35.9 IN ADULT (HCC): Primary | ICD-10-CM

## 2024-12-03 DIAGNOSIS — E66.01 CLASS 2 SEVERE OBESITY DUE TO EXCESS CALORIES WITH SERIOUS COMORBIDITY AND BODY MASS INDEX (BMI) OF 35.0 TO 35.9 IN ADULT (HCC): Primary | ICD-10-CM

## 2024-12-03 NOTE — TELEPHONE ENCOUNTER
Medication Quantity Refills Start End   semaglutide-weight management (WEGOVY) 2.4 MG/0.75ML Subcutaneous Solution Auto-injector 4 each 11 7/16/2024 --   Sig:   Inject 0.75 mL (2.4 mg total) into the skin once a week.     Key:[id number]

## 2024-12-05 NOTE — TELEPHONE ENCOUNTER
Medication PA Requested: semaglutide-weight management (WEGOVY) 2.4 MG/0.75ML Subcutaneous Solution Auto-injector                                                          CoverMyMeds Used: yes  Key: [id number]   Quantity: 4 each  Day Supply:   Sig:   Inject 0.75 mL (2.4 mg total) into the skin once a week   DX Code:    E10.65

## 2024-12-11 NOTE — TELEPHONE ENCOUNTER
Previous PA for wegovy has     Note from payer: This medication or product was previously approved on [id number] from 2023 to 2024. You will be able to fill a prescription for this medication at your pharmacy. If your pharmacy has questions regarding the processing of your prescription, please have them call the [facility] pharmacy help desk at [phone]. **Please note: [employer] enrollment is also required for product coverage. Please call [phone] or visit [employer] to enroll with [email or web address] or contact a Care Advocate at [phone]) with any questions.   Payer: [employer] Case ID: [id number]    Called patient's insurance to submit urgent prior authorization. Looks like patient has new insurance as of 2024. I was directed to call [phone] for prior authorization line. Spoke with Kp with [employer] prior authorization line. Clinical questions submitted. Reference number [id number]. Will fax clinical information to [phone]. Decision should be made within 72 hours. Called and updated the patient.

## 2024-12-17 NOTE — TELEPHONE ENCOUNTER
Received denial for Wegovy due to not providing documentation showing that patient has had a 5% weight loss while using this drug.    Per chart review, patient starting weight for Wegovy was 250lb. Last documented weight was 251lb.     Called and notified patient. Informed patient that we can appeal decision if we have an updated weight that shows a 5% weight loss. Scheduled patient for [place] to document an updated weight.     Denial placed in red folder.

## 2024-12-19 ENCOUNTER — NURSE ONLY (OUTPATIENT)
Dept: ENDOCRINOLOGY CLINIC | Facility: CLINIC | Age: 59
End: 2024-12-19

## 2024-12-19 VITALS — BODY MASS INDEX: 35.73 KG/M2 | HEIGHT: 71 IN | WEIGHT: 255.19 LBS

## 2024-12-19 DIAGNOSIS — E66.01 CLASS 2 SEVERE OBESITY DUE TO EXCESS CALORIES WITH SERIOUS COMORBIDITY AND BODY MASS INDEX (BMI) OF 35.0 TO 35.9 IN ADULT (HCC): Primary | ICD-10-CM

## 2024-12-19 DIAGNOSIS — E66.812 CLASS 2 SEVERE OBESITY DUE TO EXCESS CALORIES WITH SERIOUS COMORBIDITY AND BODY MASS INDEX (BMI) OF 35.0 TO 35.9 IN ADULT (HCC): Primary | ICD-10-CM

## 2024-12-19 PROCEDURE — 3008F BODY MASS INDEX DOCD: CPT | Performed by: INTERNAL MEDICINE

## 2024-12-19 NOTE — TELEPHONE ENCOUNTER
Dr Ji-  Patient presented to clinic today for weight check for PA.   Patient current weight is 255lbs. Wegovy will not be covered.     Patient states that he did lose approx 10lbs while on Wegovy, but then Wegovy stopped working for him. States he also was frequently nauseas on medication.    Dr Ji- would you like to try Zepbound?   car

## 2024-12-21 RX ORDER — TIRZEPATIDE 5 MG/.5ML
5 INJECTION, SOLUTION SUBCUTANEOUS WEEKLY
Qty: 2 ML | Refills: 2 | Status: SHIPPED | OUTPATIENT
Start: 2024-12-21 | End: 2025-01-20

## 2025-01-10 ENCOUNTER — TELEPHONE (OUTPATIENT)
Dept: ENDOCRINOLOGY CLINIC | Facility: CLINIC | Age: 60
End: 2025-01-10

## 2025-01-10 NOTE — TELEPHONE ENCOUNTER
Medication PA Requested:  semaglutide-weight management (WEGOVY) 2.4 MG/0.75ML Subcutaneous Solution Auto-injector                                                        CoverMyMeds Used: yes  Key:[id number]   Quantity: 4 each  Day Supply: 90  Sig: nject 0.75 mL (2.4 mg total) into the skin once a week.   DX Code:    E10.65

## 2025-01-10 NOTE — TELEPHONE ENCOUNTER
Medication Quantity Refills Start End   semaglutide-weight management (WEGOVY) 2.4 MG/0.75ML Subcutaneous Solution Auto-injector 4 each 11 7/16/2024 --   Sig:   Inject 0.75 mL (2.4 mg total) into the skin once a week.     Key:[id number]

## 2025-01-16 NOTE — TELEPHONE ENCOUNTER
Medication PA Requested:  semaglutide-weight management (WEGOVY) 2.4 MG/0.75ML Subcutaneous Solution Auto-injector                                                        CoverMyMeds Used: No  Key:  Quantity: 4 each  Day Supply: 90  Sig: nject 0.75 mL (2.4 mg total) into the skin once a week.   DX Code: E10.65      Electronic prior authorization submitted, last office visit 9/4 and A1C 9/4  Awaiting determination

## 2025-01-27 NOTE — TELEPHONE ENCOUNTER
Denied due to patient not having CV diagnosis such as heart attach, stroke or symptomatic vascular disease. PA submitted for weight loss with evidence of BMI >30     Case Id  [id number]  Reference Id  [id number]

## 2025-02-13 ENCOUNTER — OFFICE VISIT (OUTPATIENT)
Dept: FAMILY MEDICINE CLINIC | Facility: CLINIC | Age: 60
End: 2025-02-13
Payer: COMMERCIAL

## 2025-02-13 VITALS
DIASTOLIC BLOOD PRESSURE: 76 MMHG | BODY MASS INDEX: 36.4 KG/M2 | WEIGHT: 260 LBS | HEIGHT: 71 IN | HEART RATE: 67 BPM | SYSTOLIC BLOOD PRESSURE: 156 MMHG | OXYGEN SATURATION: 95 % | RESPIRATION RATE: 18 BRPM

## 2025-02-13 DIAGNOSIS — G47.33 OSA (OBSTRUCTIVE SLEEP APNEA): ICD-10-CM

## 2025-02-13 DIAGNOSIS — M79.645 CHRONIC PAIN OF LEFT THUMB: Primary | ICD-10-CM

## 2025-02-13 DIAGNOSIS — G62.9 NEUROPATHY: ICD-10-CM

## 2025-02-13 DIAGNOSIS — I10 PRIMARY HYPERTENSION: ICD-10-CM

## 2025-02-13 DIAGNOSIS — R13.14 PHARYNGOESOPHAGEAL DYSPHAGIA: ICD-10-CM

## 2025-02-13 DIAGNOSIS — G89.29 CHRONIC PAIN OF LEFT THUMB: Primary | ICD-10-CM

## 2025-02-13 PROCEDURE — 3077F SYST BP >= 140 MM HG: CPT | Performed by: FAMILY MEDICINE

## 2025-02-13 PROCEDURE — 3078F DIAST BP <80 MM HG: CPT | Performed by: FAMILY MEDICINE

## 2025-02-13 PROCEDURE — 99214 OFFICE O/P EST MOD 30 MIN: CPT | Performed by: FAMILY MEDICINE

## 2025-02-13 PROCEDURE — 3008F BODY MASS INDEX DOCD: CPT | Performed by: FAMILY MEDICINE

## 2025-02-13 RX ORDER — LOSARTAN POTASSIUM AND HYDROCHLOROTHIAZIDE 12.5; 5 MG/1; MG/1
TABLET ORAL
Qty: 30 TABLET | Refills: 3 | Status: SHIPPED | OUTPATIENT
Start: 2025-02-13

## 2025-02-13 RX ORDER — PREGABALIN 25 MG/1
25 CAPSULE ORAL 2 TIMES DAILY
Qty: 180 CAPSULE | Refills: 1 | Status: SHIPPED | OUTPATIENT
Start: 2025-02-13

## 2025-02-13 NOTE — PATIENT INSTRUCTIONS
Toe is mostly likely from pinched nerve in your back.    Take lyrica, pregabalin 25mg twice daily.  After 1 week if its not helping increase to 50mg twice daily.  If still not better after 1 week increase to 75 mg. You can switch to just nightly once you find the right dose.  If it doesn't help at 75mg then slowly wean off. Just the way you went up, go back down.

## 2025-02-13 NOTE — PROGRESS NOTES
Subjective:   Patient ID: Montrell Emerson is a 59 year old male.    Toe Pain: Middle toe pain on left foot pain x 1 month.  Burning pain, numbness.  Worse with prolonged standing or when sleeping.  No injury.  No swelling.    Hand Pain: Left hand thumb. X 1 month.  No injury.    Dysphagia: Pt reports difficulty swallowing.  Since thyroid taken out.    HTN.  New.  No CP/SOB.  No symptoms.  No side effects    + snoring.  + unrefreshing sleep.  + daytime somnolence.  + fatigue.  + apnea.        History/Other:   Review of Systems   All other systems reviewed and are negative.    Current Outpatient Medications   Medication Sig Dispense Refill    pregabalin (LYRICA) 25 MG Oral Cap Take 1 capsule (25 mg total) by mouth 2 (two) times daily. 180 capsule 1    losartan-hydroCHLOROthiazide 50-12.5 MG Oral Tab 1/2 tab daily x 1 week, then 1 tab daily. 30 tablet 3    levothyroxine 200 MCG Oral Tab TAKE 1 TABLET BY MOUTH ONCE DAILY BEFORE BREAKFAST 90 tablet 1    Continuous Glucose Sensor (DEXCOM G7 SENSOR) Does not apply Misc 1 each Every 10 days. Use as directed every 10 days 9 each 1    Glucose Blood (ONETOUCH VERIO) In Vitro Strip 200 strips by In Vitro route in the morning, at noon, and at bedtime. 300 strip 1    Glucose Blood (CONTOUR NEXT TEST) In Vitro Strip Test 4-5 times daily 500 each 0    HUMALOG 100 UNIT/ML Injection Solution 150 Units by Insulin pump route daily. 40 mL 11    Insulin Pen Needle (PEN NEEDLES) 32G X 6 MM Does not apply Misc 1 each by Does not apply route 4 (four) times daily. Use to inject insulin 4 times daily . 400 each 0    Continuous Blood Gluc Transmit (DEXCOM G6 TRANSMITTER) Does not apply Misc 1 each by Does not apply route every 3 (three) months. 1 each 1    ASCENSIA MICROFILL TEST In Vitro Strip TEST SIX TIMES DAILY 200 Strip 2     Allergies:Allergies[1]    Objective:   Physical Exam  Vitals reviewed.   Constitutional:       General: He is not in acute distress.     Appearance: He is  well-developed. He is not diaphoretic.   Eyes:      General: No scleral icterus.        Right eye: No discharge.         Left eye: No discharge.      Conjunctiva/sclera: Conjunctivae normal.   Cardiovascular:      Rate and Rhythm: Normal rate and regular rhythm.      Heart sounds: Normal heart sounds.   Pulmonary:      Effort: Pulmonary effort is normal. No respiratory distress.      Breath sounds: Normal breath sounds. No wheezing or rales.   Musculoskeletal:      Comments: No tenderness to touch of toe.    Left thumb tenderness and pain with flexion and extension.  Left thumb distal aspect is clicking and triggering.         Assessment & Plan:   1. Chronic pain of left thumb    2. Neuropathy    3. LEXI (obstructive sleep apnea)    4. Pharyngoesophageal dysphagia    5. Primary hypertension      1. Chronic pain of left thumb  - [facility] [place]erral - [facility]  - Ortho Referral - In Network    2. Neuropathy  - pregabalin (LYRICA) 25 MG Oral Cap; Take 1 capsule (25 mg total) by mouth 2 (two) times daily.  Dispense: 180 capsule; Refill: 1    3. LXEI (obstructive sleep apnea)  - Diagnostic Sleep Study-split night PAP implemented if criteria met  - General sleep study; Future    4. Pharyngoesophageal dysphagia  - US THYROID (CPT=76536); Future  - XR VIDEO SWALLOW (CPT=74230); Future  - Speech Therapy Referral - [facility]  - ENT Referral - In Network    5. Primary hypertension  - losartan-hydroCHLOROthiazide 50-12.5 MG Oral Tab; 1/2 tab daily x 1 week, then 1 tab daily.  Dispense: 30 tablet; Refill: 3      Meds This Visit:  Requested Prescriptions     Signed Prescriptions Disp Refills    pregabalin (LYRICA) 25 MG Oral Cap 180 capsule 1     Sig: Take 1 capsule (25 mg total) by mouth 2 (two) times daily.    losartan-hydroCHLOROthiazide 50-12.5 MG Oral Tab 30 tablet 3     Si/2 tab daily x 1 week, then 1 tab daily.       Imaging & Referrals:  OP REFERRAL TO [facility]  ORTHOPEDIC -  INTERNAL  OP REFERRAL TO DIAGNOSTIC SLEEP STUDY  [facility] REFERRAL TO [facility]  ENT - INTERNAL  US THYROID (CPT=76536)  XR VIDEO SWALLOW (CPT=74230)         [1]   Allergies  Allergen Reactions    Bee Venom

## 2025-02-18 ENCOUNTER — TELEPHONE (OUTPATIENT)
Dept: FAMILY MEDICINE CLINIC | Facility: CLINIC | Age: 60
End: 2025-02-18

## 2025-02-18 ENCOUNTER — APPOINTMENT (OUTPATIENT)
Dept: GENERAL RADIOLOGY | Age: 60
End: 2025-02-18
Attending: EMERGENCY MEDICINE
Payer: COMMERCIAL

## 2025-02-18 ENCOUNTER — HOSPITAL ENCOUNTER (EMERGENCY)
Age: 60
Discharge: HOME | End: 2025-02-18
Attending: EMERGENCY MEDICINE
Payer: COMMERCIAL

## 2025-02-18 VITALS
HEART RATE: 66 BPM | BODY MASS INDEX: 36.12 KG/M2 | DIASTOLIC BLOOD PRESSURE: 69 MMHG | RESPIRATION RATE: 18 BRPM | WEIGHT: 258 LBS | TEMPERATURE: 98 F | OXYGEN SATURATION: 96 % | SYSTOLIC BLOOD PRESSURE: 135 MMHG | HEIGHT: 71 IN

## 2025-02-18 DIAGNOSIS — J45.21 MILD INTERMITTENT REACTIVE AIRWAY DISEASE WITH ACUTE EXACERBATION (HCC): ICD-10-CM

## 2025-02-18 DIAGNOSIS — J40 BRONCHITIS: ICD-10-CM

## 2025-02-18 DIAGNOSIS — I10 PRIMARY HYPERTENSION: Primary | ICD-10-CM

## 2025-02-18 DIAGNOSIS — R51.9 NONINTRACTABLE HEADACHE, UNSPECIFIED CHRONICITY PATTERN, UNSPECIFIED HEADACHE TYPE: ICD-10-CM

## 2025-02-18 DIAGNOSIS — R06.00 DYSPNEA, UNSPECIFIED TYPE: ICD-10-CM

## 2025-02-18 LAB
ALBUMIN SERPL-MCNC: 4.2 G/DL (ref 3.2–4.8)
ALBUMIN/GLOB SERPL: 1.8 {RATIO} (ref 1–2)
ALP LIVER SERPL-CCNC: 146 U/L
ALT SERPL-CCNC: 23 U/L
ANION GAP SERPL CALC-SCNC: 5 MMOL/L (ref 0–18)
AST SERPL-CCNC: 24 U/L (ref ?–34)
BASOPHILS # BLD AUTO: 0.08 X10(3) UL (ref 0–0.2)
BASOPHILS NFR BLD AUTO: 1.2 %
BILIRUB SERPL-MCNC: 0.9 MG/DL (ref 0.3–1.2)
BUN BLD-MCNC: 20 MG/DL (ref 9–23)
CALCIUM BLD-MCNC: 9.1 MG/DL (ref 8.7–10.6)
CHLORIDE SERPL-SCNC: 107 MMOL/L (ref 98–112)
CO2 SERPL-SCNC: 26 MMOL/L (ref 21–32)
CREAT BLD-MCNC: 0.99 MG/DL
D DIMER PPP FEU-MCNC: 0.29 UG/ML FEU (ref ?–0.58)
EGFRCR SERPLBLD CKD-EPI 2021: 88 ML/MIN/1.73M2 (ref 60–?)
EOSINOPHIL # BLD AUTO: 0.44 X10(3) UL (ref 0–0.7)
EOSINOPHIL NFR BLD AUTO: 6.6 %
ERYTHROCYTE [DISTWIDTH] IN BLOOD BY AUTOMATED COUNT: 13.5 %
GLOBULIN PLAS-MCNC: 2.4 G/DL (ref 2–3.5)
GLUCOSE BLD-MCNC: 185 MG/DL (ref 70–99)
HCT VFR BLD AUTO: 46.6 %
HGB BLD-MCNC: 15.7 G/DL
IMM GRANULOCYTES # BLD AUTO: 0.02 X10(3) UL (ref 0–1)
IMM GRANULOCYTES NFR BLD: 0.3 %
LYMPHOCYTES # BLD AUTO: 0.93 X10(3) UL (ref 1–4)
LYMPHOCYTES NFR BLD AUTO: 14 %
MCH RBC QN AUTO: 27.8 PG (ref 26–34)
MCHC RBC AUTO-ENTMCNC: 33.7 G/DL (ref 31–37)
MCV RBC AUTO: 82.5 FL
MONOCYTES # BLD AUTO: 0.68 X10(3) UL (ref 0.1–1)
MONOCYTES NFR BLD AUTO: 10.3 %
NEUTROPHILS # BLD AUTO: 4.48 X10 (3) UL (ref 1.5–7.7)
NEUTROPHILS # BLD AUTO: 4.48 X10(3) UL (ref 1.5–7.7)
NEUTROPHILS NFR BLD AUTO: 67.6 %
NT-PROBNP SERPL-MCNC: 57 PG/ML (ref ?–125)
OSMOLALITY SERPL CALC.SUM OF ELEC: 293 MOSM/KG (ref 275–295)
PLATELET # BLD AUTO: 254 10(3)UL (ref 150–450)
POTASSIUM SERPL-SCNC: 4 MMOL/L (ref 3.5–5.1)
PROT SERPL-MCNC: 6.6 G/DL (ref 5.7–8.2)
RBC # BLD AUTO: 5.65 X10(6)UL
SODIUM SERPL-SCNC: 138 MMOL/L (ref 136–145)
TROPONIN I SERPL HS-MCNC: 8 NG/L
WBC # BLD AUTO: 6.6 X10(3) UL (ref 4–11)

## 2025-02-18 PROCEDURE — 83880 ASSAY OF NATRIURETIC PEPTIDE: CPT | Performed by: EMERGENCY MEDICINE

## 2025-02-18 PROCEDURE — 71045 X-RAY EXAM CHEST 1 VIEW: CPT | Performed by: EMERGENCY MEDICINE

## 2025-02-18 PROCEDURE — 93005 ELECTROCARDIOGRAM TRACING: CPT

## 2025-02-18 PROCEDURE — 80053 COMPREHEN METABOLIC PANEL: CPT | Performed by: EMERGENCY MEDICINE

## 2025-02-18 PROCEDURE — 85379 FIBRIN DEGRADATION QUANT: CPT | Performed by: EMERGENCY MEDICINE

## 2025-02-18 PROCEDURE — 93010 ELECTROCARDIOGRAM REPORT: CPT

## 2025-02-18 PROCEDURE — 36415 COLL VENOUS BLD VENIPUNCTURE: CPT

## 2025-02-18 PROCEDURE — 99284 EMERGENCY DEPT VISIT MOD MDM: CPT

## 2025-02-18 PROCEDURE — 85025 COMPLETE CBC W/AUTO DIFF WBC: CPT | Performed by: EMERGENCY MEDICINE

## 2025-02-18 PROCEDURE — 99285 EMERGENCY DEPT VISIT HI MDM: CPT

## 2025-02-18 PROCEDURE — 84484 ASSAY OF TROPONIN QUANT: CPT | Performed by: EMERGENCY MEDICINE

## 2025-02-18 RX ORDER — DOXYCYCLINE 100 MG/1
100 CAPSULE ORAL 2 TIMES DAILY
Qty: 14 CAPSULE | Refills: 0 | Status: SHIPPED | OUTPATIENT
Start: 2025-02-18 | End: 2025-02-25

## 2025-02-18 RX ORDER — ALBUTEROL SULFATE 90 UG/1
2 INHALANT RESPIRATORY (INHALATION) EVERY 4 HOURS PRN
Qty: 1 EACH | Refills: 0 | Status: SHIPPED | OUTPATIENT
Start: 2025-02-18 | End: 2025-03-20

## 2025-02-18 NOTE — TELEPHONE ENCOUNTER
Spoke to pt,     Take a full tablet of losartan-hydrochlorothiazide 50-12.5mg and go to ER per Dr Mariscal.  Pt agreeable.

## 2025-02-18 NOTE — ED PROVIDER NOTES
Patient Seen in: [facility] In [place]      History     Chief Complaint   Patient presents with    Hypertension     Stated Complaint: htn for past week.  reports headache and sob    Subjective:   HPI      Patient is a 59-year-old gentleman with multiple complaints.  He is on blood pressure medicines which she is titrating up.  Says blood pressure was high at home.  He has a frontal headache.  He said some dyspnea with activity.  Denies chest pain or heaviness.  Has a tingling sensation in both arms and hands.  No focal findings.  No abdominal pain.  Patient states he has been coughing off and on for the last 2 months.  Had to be put on oxygen a year ago after a viral infection.  No other specific complaints.  Patient is otherwise at his medical baseline.    Objective:     Past Medical History:    Calculus of kidney    Essential hypertension    Hearing impairment    [place] in [place]    High cholesterol    Personal history of urinary calculi    Pneumonia due to organism    Type 2 diabetes mellitus with hyperglycemia, with long-term current use of insulin ([facility])    Type I (juvenile type) diabetes mellitus without mention of complication, not stated as uncontrolled    Unspecified septicemia(038.9)              Past Surgical History:   Procedure Laterality Date    Appendectomy      Colonoscopy  01/01/2008    Other      lithotripsy    Other surgical history      bilat knees torn miniscus    Other surgical history  10/30/2023    Parathyroidectomy    Thyroidectomy                  Social History     Socioeconomic History    Marital status:    Tobacco Use    Smoking status: Never    Smokeless tobacco: Never   Vaping Use    Vaping status: Never Used   Substance and Sexual Activity    Alcohol use: Not Currently     Comment: Rarely    Drug use: No   Other Topics Concern    Caffeine Concern Yes     Comment: soda 16oz/day    Exercise No                  Physical Exam     ED Triage Vitals [02/18/25 1039]    /71   Pulse 72   Resp 18   Temp 98 °F (36.7 °C)   Temp src Oral   SpO2 98 %   O2 Device None (Room air)       Current Vitals:   Vital Signs  BP: 135/69  Pulse: 66  Resp: 18  Temp: 98 °F (36.7 °C)  Temp src: Oral    Oxygen Therapy  SpO2: 96 %  O2 Device: None (Room air)        Physical Exam  General: Patient is resting comfortably in no acute distress  HEENT: Normal cephalic atraumatic.  Nonicteric sclera.  Moist mucous membranes.  No meningismus.  No adenopathy  Lungs: No tachypnea.  Lungs clear to auscultation bilaterally without rales/rhonchi.  Equal breath sounds bilaterally  Cardiac: No tachycardia.  No murmurs.  Regular rate and rhythm.  Abdomen: Soft and nontender throughout.  No rebound or guarding  Extremities: No clubbing/cyanosis/edema.  Skin: No rashes, no pallor  Neuro: Awake oriented ×3.  Nonfocal.  Good strength throughout    ED Course     Labs Reviewed   COMP METABOLIC PANEL (14) - Abnormal; Notable for the following components:       Result Value    Glucose 185 (*)     Alkaline Phosphatase 146 (*)     All other components within normal limits   CBC WITH DIFFERENTIAL WITH PLATELET - Abnormal; Notable for the following components:    Lymphocyte Absolute 0.93 (*)     All other components within normal limits   TROPONIN I HIGH SENSITIVITY - Normal   PRO BETA NATRIURETIC PEPTIDE - Normal   D-DIMER - Normal   RAINBOW DRAW LAVENDER   RAINBOW DRAW LIGHT GREEN   RAINBOW DRAW BLUE     EKG    Rate, intervals and axes as noted on EKG Report.  Rate: 73  Rhythm: Sinus Rhythm  Reading: Normal sinus rhythm.  Poor R wave progression.  No significant changes from old EKGs.  Axis/intervals are noted.  [place], agree with EKG report              Chest x-ray: I personally reviewed the films and my independent interpertaion showed no interstitial and bronchial wall thickening in the mid lower lung zone.  Chronic interstitial lung disease versus bronchitis, reactive airway disease.  Blood work reviewed.  Alk phos 146.   BNP 57.  Troponin 8.  D-dimer 0.29.  CBC normal.       MDM      High blood pressure, headache, shortness of breath.  Symptomatic high blood pressure versus heart failure versus PE versus other.  Blood pressure 135/69.  Patient says he was told to take half a pill of initially.  He is started back taking a full pill now.  Discussed with patient, continues blood pressure medications for polyp.  Should follow-up with his primary care doctor next 2 to 3 days.  Return if worsening symptoms or new complaints  Blood work reassuring.  Chest x-ray suggest bronchitis reactive airway disease versus interstitial lung disease.  May explain some of his shortness of breath.  Will treat with albuterol, has been coughing off and on for last 2 months.  Will treat with doxycycline for possible bacterial bronchitis.  She follow-up pulmonology.  Follow-up with his primary care doctor.  Return if worsening symptoms or new complaints.  Blood pressure improved.      Medical Decision Making      Disposition and Plan     Clinical Impression:  1. Primary hypertension    2. Dyspnea, unspecified type    3. Nonintractable headache, unspecified chronicity pattern, unspecified headache type         Disposition:  There is no disposition on file for this visit.  There is no disposition time on file for this visit.    Follow-up:  No follow-up provider specified.        Medications Prescribed:  Current Discharge Medication List              Supplementary Documentation:

## 2025-02-18 NOTE — ED INITIAL ASSESSMENT (HPI)
Went to MD on thurs for headache, numbness and sob - seen on thurs- bp was elevated and started on BP meds but bp remains high and symptoms remain

## 2025-02-18 NOTE — TELEPHONE ENCOUNTER
Spoke to pt,    2/18/25  817a /92  6a /91  530 /93  420a /92    SOB, headache, numbness in arms x 2 weeks     Pt said felt this way since OV 2/13/25, staring taking half of losartan-hydrochlorothiazide 50-12.5 mg     Take full tablet? ER?    Please advise

## 2025-02-19 LAB
ATRIAL RATE: 73 BPM
P AXIS: 23 DEGREES
P-R INTERVAL: 142 MS
Q-T INTERVAL: 402 MS
QRS DURATION: 86 MS
QTC CALCULATION (BEZET): 442 MS
R AXIS: 1 DEGREES
T AXIS: 17 DEGREES
VENTRICULAR RATE: 73 BPM

## 2025-03-12 ENCOUNTER — OFFICE VISIT (OUTPATIENT)
Dept: ENDOCRINOLOGY CLINIC | Facility: CLINIC | Age: 60
End: 2025-03-12

## 2025-03-12 VITALS
WEIGHT: 262 LBS | SYSTOLIC BLOOD PRESSURE: 126 MMHG | DIASTOLIC BLOOD PRESSURE: 67 MMHG | HEIGHT: 71 IN | BODY MASS INDEX: 36.68 KG/M2 | HEART RATE: 73 BPM

## 2025-03-12 DIAGNOSIS — Z98.890 S/P PARATHYROIDECTOMY: ICD-10-CM

## 2025-03-12 DIAGNOSIS — E03.9 HYPOTHYROIDISM, UNSPECIFIED TYPE: ICD-10-CM

## 2025-03-12 DIAGNOSIS — E55.9 VITAMIN D DEFICIENCY: ICD-10-CM

## 2025-03-12 DIAGNOSIS — E11.65 TYPE 2 DIABETES MELLITUS WITH HYPERGLYCEMIA, WITH LONG-TERM CURRENT USE OF INSULIN (HCC): ICD-10-CM

## 2025-03-12 DIAGNOSIS — Z79.4 TYPE 2 DIABETES MELLITUS WITH HYPERGLYCEMIA, WITH LONG-TERM CURRENT USE OF INSULIN (HCC): ICD-10-CM

## 2025-03-12 DIAGNOSIS — E10.65 UNCONTROLLED TYPE 1 DIABETES MELLITUS WITH HYPERGLYCEMIA (HCC): Primary | ICD-10-CM

## 2025-03-12 DIAGNOSIS — Z90.89 S/P PARATHYROIDECTOMY: ICD-10-CM

## 2025-03-12 DIAGNOSIS — E78.5 HYPERLIPIDEMIA, UNSPECIFIED HYPERLIPIDEMIA TYPE: ICD-10-CM

## 2025-03-12 LAB
GLUCOSE BLOOD: 189
HEMOGLOBIN A1C: 7.6 % (ref 4.3–5.6)
TEST STRIP EXPIRATION DATE: NORMAL DATE
TEST STRIP LOT #: NORMAL NUMERIC

## 2025-03-12 PROCEDURE — 3008F BODY MASS INDEX DOCD: CPT | Performed by: INTERNAL MEDICINE

## 2025-03-12 PROCEDURE — 3074F SYST BP LT 130 MM HG: CPT | Performed by: INTERNAL MEDICINE

## 2025-03-12 PROCEDURE — 83036 HEMOGLOBIN GLYCOSYLATED A1C: CPT | Performed by: INTERNAL MEDICINE

## 2025-03-12 PROCEDURE — 3051F HG A1C>EQUAL 7.0%<8.0%: CPT | Performed by: INTERNAL MEDICINE

## 2025-03-12 PROCEDURE — 82947 ASSAY GLUCOSE BLOOD QUANT: CPT | Performed by: INTERNAL MEDICINE

## 2025-03-12 PROCEDURE — 99214 OFFICE O/P EST MOD 30 MIN: CPT | Performed by: INTERNAL MEDICINE

## 2025-03-12 PROCEDURE — 3078F DIAST BP <80 MM HG: CPT | Performed by: INTERNAL MEDICINE

## 2025-03-12 RX ORDER — TIRZEPATIDE 2.5 MG/.5ML
2.5 INJECTION, SOLUTION SUBCUTANEOUS WEEKLY
Qty: 2 ML | Refills: 0 | Status: SHIPPED | OUTPATIENT
Start: 2025-03-12 | End: 2025-04-11

## 2025-03-12 RX ORDER — ACYCLOVIR 400 MG/1
1 TABLET ORAL
Qty: 9 EACH | Refills: 1 | Status: SHIPPED | OUTPATIENT
Start: 2025-03-12 | End: 2025-03-20

## 2025-03-12 NOTE — PROGRESS NOTES
Name: Montrell Emerson  Date: 3/12/25    Referring Physician: No ref. provider found    INITIAL VISIT  Montrell Emerson is a 59 year old male who had presented for evaluation and management of Type 1.5 diabetes that was diagnosed when he was in his 20s and hypothyroidism. At that visit, he had been having some complaints regarding his pump not connecting to the Guardian, and so subsequent to our visit, we changed him over to the Tandem pump. He has been doing much better on this, with less low blood sugars. In fact his blood sugars have been much better controlled.     I had also started him on Saxenda, and he had noted better blood sugars, but he was not able to tolerate the medication due to GI side effects. I then started him on Wegovy. He had been on the 2.4mg dose and doing well with this but then had to stop this prior to his surgery.     He has not been able to obtain the GLP-1.      Blood Glucose Today: 136  HbA1C or glycohemoglobin is 7.6 today (and it was 8.2 on 3/26/24 and it was 7.2 on 8/28/23 and it was 7.1 on 4/12/23 and it was 6.8 on 1/11/23 and it was 7.6 on 8/31/22 and it was 7.7 on 1/05/22 and it was 8.5 % on 6/16/21 and it was 8.0 % in 11/16/21)  Type 1 or Type 2?: Type 1.5    He has been checking blood sugar 5 times a day,and he uses the Dexcom now.     Medications for DM: He has the Tandem pump, Control IQ:  Basal rate- 5.00  ICR- 1:16  ISR- 1:40  Target- 110  AIT- 5hr    Current 2 weeks  Very High- 11%  High- 32%  Target- 56%  Low- 0.9%  Very Low- 0.1%    Dietary compliance: good:  Occasionally:  Breakfast- [employer], [employer]  Lunch- lunchables  Dinner- Fastfood ([employer] brings it home); beef stroganoff, tacos, chicken; no vegetables    Exercise: has time but does not  Has an apple watch    Polyuria/polydipsia: none    Blurred vision: none    Flu Vaccine This Season: yes, received Covid vaccine     REVIEW OF SYSTEMS  Eyes: Diabetic retinopathy present: none            Most recent  visit to eye doctor in last 12 months: saw recently    CV: Cardiovascular disease present: no         Hypertension present: no, but today elevated         Hyperlipidemia present: LDL elevated, not on meds (11/26/24)         Peripheral Vascular Disease present: yes    : Nephropathy present: none (11/26/24); creatinine- 0.99 (2/18/25)    Neuro: Neuropathy present: yes, has this    Skin: Infection or ulceration: no     Osteoporosis: no; vitamin D- 33 (7/06/24)    Thyroid disease: yes, taking LT4- 200mcg; TSH- 0.24 (11/26/24)    Medications:     Current Outpatient Medications:     Continuous Glucose Sensor (DEXCOM G7 SENSOR) Does not apply Misc, 1 each Every 10 days. Use as directed every 10 days, Disp: 9 each, Rfl: 1    HUMALOG 100 UNIT/ML Injection Solution, 150 Units by Insulin pump route daily., Disp: 40 mL, Rfl: 11    albuterol 108 (90 Base) MCG/ACT Inhalation Aero Soln, Inhale 2 puffs into the lungs every 4 (four) hours as needed for Wheezing., Disp: 1 each, Rfl: 0    pregabalin (LYRICA) 25 MG Oral Cap, Take 1 capsule (25 mg total) by mouth 2 (two) times daily. (Patient not taking: Reported on 2/18/2025), Disp: 180 capsule, Rfl: 1    losartan-hydroCHLOROthiazide 50-12.5 MG Oral Tab, 1/2 tab daily x 1 week, then 1 tab daily., Disp: 30 tablet, Rfl: 3    levothyroxine 200 MCG Oral Tab, TAKE 1 TABLET BY MOUTH ONCE DAILY BEFORE BREAKFAST, Disp: 90 tablet, Rfl: 1    Glucose Blood (ONETOUCH VERIO) In Vitro Strip, 200 strips by In Vitro route in the morning, at noon, and at bedtime., Disp: 300 strip, Rfl: 1    Glucose Blood (CONTOUR NEXT TEST) In Vitro Strip, Test 4-5 times daily, Disp: 500 each, Rfl: 0    Insulin Pen Needle (PEN NEEDLES) 32G X 6 MM Does not apply Misc, 1 each by Does not apply route 4 (four) times daily. Use to inject insulin 4 times daily ., Disp: 400 each, Rfl: 0    Continuous Blood Gluc Transmit (DEXCOM G6 TRANSMITTER) Does not apply Misc, 1 each by Does not apply route every 3 (three) months.,  Disp: 1 each, Rfl: 1    ASCENSIA MICROFILL TEST In Vitro Strip, TEST SIX TIMES DAILY, Disp: 200 Strip, Rfl: 2     Allergies:   Allergies   Allergen Reactions    Bee Venom        Social History:   Social History     Socioeconomic History    Marital status:    Tobacco Use    Smoking status: Never    Smokeless tobacco: Never   Vaping Use    Vaping status: Never Used   Substance and Sexual Activity    Alcohol use: Not Currently     Comment: Rarely    Drug use: No   Other Topics Concern    Caffeine Concern Yes     Comment: soda 16oz/day    Exercise No       Medical History:   Past Medical History:    Calculus of kidney    Essential hypertension    Hearing impairment    [place] in [place]    High cholesterol    Personal history of urinary calculi    Pneumonia due to organism    Type 2 diabetes mellitus with hyperglycemia, with long-term current use of insulin ([facility])    Type I (juvenile type) diabetes mellitus without mention of complication, not stated as uncontrolled    Unspecified septicemia(038.9)     Surgical history:   Past Surgical History:   Procedure Laterality Date    Appendectomy      Colonoscopy  01/01/2008    Other      lithotripsy    Other surgical history      bilat knees torn miniscus    Other surgical history  10/30/2023    Parathyroidectomy    Thyroidectomy           PHYSICAL EXAM  Vitals:    03/12/25 1637   BP: 126/67   Pulse: 73   Weight: 262 lb (118.8 kg)   Height: 5' 11\" (1.803 m)           General Appearance:  alert, well developed, in no acute distress  Eyes:  normal conjunctivae, sclera., normal sclera and normal pupils  Psychiatric:  oriented to time, self, and place  Nutritional:  no abnormal weight gain or loss    Lab Data:   Lab Results   Component Value Date     (H) 04/12/2022    A1C 7.6 (A) 09/04/2024     Lab Results   Component Value Date     (H) 02/18/2025    BUN 20 02/18/2025    BUNCREA 26 (H) 11/26/2024    CREATSERUM 0.99 02/18/2025    ANIONGAP 5 02/18/2025    GFR 88  07/23/2018    GFRNAA 80 01/07/2022    GFRAA 93 01/07/2022    CA 9.1 02/18/2025    OSMOCALC 293 02/18/2025    ALKPHO 146 (H) 02/18/2025    AST 24 02/18/2025    ALT 23 02/18/2025    BILT 0.9 02/18/2025    TP 6.6 02/18/2025    ALB 4.2 02/18/2025    GLOBULIN 2.4 02/18/2025    AGRATIO 2.0 11/26/2024     02/18/2025    K 4.0 02/18/2025     02/18/2025    CO2 26.0 02/18/2025     Lab Results   Component Value Date    CHOLEST 198 11/26/2024    TRIG 74 11/26/2024    HDL 53 11/26/2024     (H) 11/26/2024    VLDL 16 07/10/2021    TCHDLRATIO 3.7 11/26/2024    NONHDLC 145 (H) 11/26/2024     Lab Results   Component Value Date    MALBP 1.81 07/10/2021    CREUR 125.00 07/10/2021    MALBCRECALC 3.0 07/23/2018    MICROALBUMIN 1.0 11/26/2024    CREAUR 123 11/26/2024    MALBCREACALC 8 11/26/2024     ASSESSMENT/PLAN:    This is a 59 year-old man here for follow-up of management of uncontrolled  type 2 diabetes. We discussed the ABCs of DM. He also has hypothyroidism.    1.) Hyperglycemia Management- We discussed the importance of glycemic control to prevent complications of diabetes. We discussed the importance of SBGM.   - The new Tandem pump is working well for him, he is not having hypoglycemia;   - Continue same settings for now  - Prescribe the Dexcom 7  - Start patient on Mounjaro 2.5mg qweekly for type 2 diabetes      2.) Management of Diabetic Complications- We discussed the complications of diabetes include retinopathy, neuropathy, nephropathy and cardiovascular disease.   - up to date with flu vaccine, received Covid vaccine   - Ophtho up to date  - MAC- will check in 3 months  - Lipid panel- will check in 3 months (I have started him on atorvastatin 10mg to be taken every day)  - CMP- will check in 3 months  - CAD- none  - BP- not at goal, on meds, will monitor  - Neuropathy- he has this    3.) Lifestyle Management for Diabetes- We discussed importance of a low CHO diet, and recommend 45gm per meal or 135gm per  day. We discussed the importance of trying to follow a Mediterranean diet, with an emphasis on vegetables at every meal, with lots whole grains, and protein from either plant-based sources, or poultry and fish.   - He is on the road a lot. He is eating better  - I asked him to try and make some time for exercise; maybe log steps everyday. He said that he would try    4.) Hypothyroidism  - Continue same dose for now (200mcg PO qday), check TSH and FT4 in 3 months    5.) Vitamin D deficiency  - At goal    6.) Primary Hyperparathyroidism- he is now s/p parathyroidectomy  - Check DEXA scan in 10/2025      Return to clinic in 3 months    Prior to this encounter, I spent over 15 minutes with preparing for the visit, including reviewing documents from other specialties as well as from PCP and going over test results. During the face to face encounter, I spent an additional 15 minutes which were determined for follow-up. Greater than 50% of the time was spent in counseling, anticipatory guidance, and coordination of care. Patient concerns were answered to the best of my knowledge.       3/12/25  Melissa Ji

## 2025-03-20 DIAGNOSIS — E10.65 UNCONTROLLED TYPE 1 DIABETES MELLITUS WITH HYPERGLYCEMIA (HCC): Primary | ICD-10-CM

## 2025-03-20 RX ORDER — ACYCLOVIR 400 MG/1
1 TABLET ORAL
Qty: 9 EACH | Refills: 1 | Status: SHIPPED | OUTPATIENT
Start: 2025-03-20

## 2025-03-20 RX ORDER — BLOOD SUGAR DIAGNOSTIC
1 STRIP MISCELLANEOUS 4 TIMES DAILY
Qty: 400 EACH | Refills: 0 | Status: SHIPPED | OUTPATIENT
Start: 2025-03-20

## 2025-03-20 NOTE — TELEPHONE ENCOUNTER
Endocrine Refill protocol for Glucose testing supplies     Protocol Criteria: PASSED Reason: N/A    If below requirement is met, send a 90-day supply with 1 refill per provider protocol.    Verify appointment with Endocrinology completed in the last 6 months or scheduled in the next 3 months.    Last completed office visit: 3/12/2025 Melissa Ji MD   Next scheduled Follow up:   Future Appointments   Date Time Provider Department Center   4/4/2025  9:30 AM EH XR RM4 (UGI & SWALLOW FLUOROSCOPY) [facility]   4/10/2025  5:30 PM Bertrand Mariscal DO [facility] [address]   6/10/2025  4:45 PM Levi Regalado APRN [facility]   6/25/2025  5:00 PM Melissa Ji MD [facility]

## 2025-03-25 PROBLEM — Z90.89 S/P PARATHYROIDECTOMY: Status: ACTIVE | Noted: 2025-03-25

## 2025-03-25 PROBLEM — Z98.890 S/P PARATHYROIDECTOMY: Status: ACTIVE | Noted: 2025-03-25

## 2025-03-25 RX ORDER — TIRZEPATIDE 5 MG/.5ML
5 INJECTION, SOLUTION SUBCUTANEOUS WEEKLY
Qty: 6 ML | Refills: 1 | Status: SHIPPED | OUTPATIENT
Start: 2025-04-13 | End: 2025-07-12

## 2025-03-28 ENCOUNTER — TELEPHONE (OUTPATIENT)
Dept: ENDOCRINOLOGY CLINIC | Facility: CLINIC | Age: 60
End: 2025-03-28

## 2025-03-28 DIAGNOSIS — E11.65 TYPE 2 DIABETES MELLITUS WITH HYPERGLYCEMIA, WITH LONG-TERM CURRENT USE OF INSULIN (HCC): Primary | ICD-10-CM

## 2025-03-28 DIAGNOSIS — Z79.4 TYPE 2 DIABETES MELLITUS WITH HYPERGLYCEMIA, WITH LONG-TERM CURRENT USE OF INSULIN (HCC): Primary | ICD-10-CM

## 2025-03-28 NOTE — TELEPHONE ENCOUNTER
Medication Quantity Refills Start End   Tirzepatide (MOUNJARO) 2.5 MG/0.5ML Subcutaneous Solution Auto-injector 2 mL 0 3/12/2025 4/11/2025   Sig:   Inject 2.5 mg into the skin once a week.     Route:   Subcutaneous     Order #:   [id number]         Prior Authorization    KEY: [id number]

## 2025-04-04 ENCOUNTER — TELEPHONE (OUTPATIENT)
Dept: ENDOCRINOLOGY CLINIC | Facility: CLINIC | Age: 60
End: 2025-04-04

## 2025-04-04 ENCOUNTER — HOSPITAL ENCOUNTER (OUTPATIENT)
Dept: GENERAL RADIOLOGY | Facility: HOSPITAL | Age: 60
Discharge: HOME | End: 2025-04-04
Attending: FAMILY MEDICINE
Payer: COMMERCIAL

## 2025-04-04 DIAGNOSIS — R13.14 PHARYNGOESOPHAGEAL DYSPHAGIA: ICD-10-CM

## 2025-04-04 PROCEDURE — 92526 ORAL FUNCTION THERAPY: CPT

## 2025-04-04 PROCEDURE — 92611 MOTION FLUOROSCOPY/SWALLOW: CPT

## 2025-04-04 PROCEDURE — 74230 X-RAY XM SWLNG FUNCJ C+: CPT | Performed by: FAMILY MEDICINE

## 2025-04-04 RX ORDER — METFORMIN HYDROCHLORIDE 500 MG/1
500 TABLET, EXTENDED RELEASE ORAL
Qty: 90 TABLET | Refills: 1 | Status: SHIPPED | OUTPATIENT
Start: 2025-04-04

## 2025-04-04 NOTE — TELEPHONE ENCOUNTER
Ok, noted.  Lets try him on Metformin so we can resubmit PA later.  Start Metformin ER 500mg PO daily #90, refill 1. Thanks.

## 2025-04-04 NOTE — TELEPHONE ENCOUNTER
Medication Quantity Refills Start End   Tirzepatide (MOUNJARO) 5 MG/0.5ML Subcutaneous Solution Auto-injector 6 mL 1 4/13/2025 7/12/2025   Sig:   Inject 5 mg into the skin once a week.     Key: [id number] PA WAS STARTED

## 2025-04-04 NOTE — PROGRESS NOTES
ADULT VIDEOFLUOROSCOPIC SWALLOWING STUDY    Admission Date: 4/4/2025  Evaluation Date: 04/04/25  Radiologist: Dr. John    Pt ambulated into exam room independently; alert & participatory; able to follow all commands and participate in conversation appropriately; good historian. History was obtained via electronic medical review and patient report. Pmhx includes DM, hypothyroidism, thyroid disease and hearing impairment. He reported coughing episodes when he talks too much, laughs and during meals. Video swallow study recommended to more objectively assess swallow fxn, r/o aspiration and recommend safest/least restrictive means of nutrition/hydration.     RECOMMENDATIONS   Diet Recommendations - Solids: Regular  Diet Recommendations - Liquids: Thin Liquids    Further Follow-up:  Follow Up Needed (Documentation Required): No        Aspiration Precautions: Upright position, Small bites, Small sips, Slow rate  Medication Administration Recommendations: No restrictions       HISTORY   Background/Objective Information:    Problem List  Active Problems:    * No active hospital problems. *      Past Medical History  Past Medical History:    Calculus of kidney    Essential hypertension    Hearing impairment    [place] in L ear    High cholesterol    Personal history of urinary calculi    Pneumonia due to organism    Type 2 diabetes mellitus with hyperglycemia, with long-term current use of insulin ([facility])    Type I (juvenile type) diabetes mellitus without mention of complication, not stated as uncontrolled    Unspecified septicemia(038.9)       Current Diet Consistency: Regular, Thin liquids  Prior Level of Function: Independent  Prior Living Situation: Home with spouse  History of Recent: No recent respiratory difficulty     Imaging results: CXR 2/18/25  CONCLUSION:  Stable cardiac and mediastinal contours.  Interstitial and bronchial wall thickening most prevalent within the mid/lower lung zone.  This may be a manifestation  of chronic interstitial lung disease, though in the acute setting this could   represent bronchitis or reactive airway disease/asthma.  Suspected subpleural scarring peripherally within the lower lung zone bilaterally.  No pleural effusion or pneumothorax.   Reason for Referral: R/O aspiration      Family/Patient Goals:  Determine etiology of symptoms     ASSESSMENT   DYSPHAGIA ASSESSMENT  Test completed in conjunction with Radiologist.  Patient Positioned: Upright, Midline, Standard chair.  Patient Viewed: Lateral.   .  Consistencies Presented: Thin liquids, Puree, Hard solid to assess oropharyngeal swallow function and assess for compensatory strategies to improve safe swallow function.    THIN LIQUIDS  Method of Presentation: Teaspoon, Cup, Straw  Oral Phase of Swallow (VFSS - Thin Liquids): Within Functional Limits  Triggered at: Base of tongue  Residue Severity, Location: None  Laryngeal Penetration: None  Tracheal Aspiration: None        PUREE  Oral Phase of Swallow (VFSS - Puree): Within Functional Limits  Triggered at: Base of tongue  Residue Severity, Location: None  Laryngeal Penetration: None  Tracheal Aspiration: None     HARD SOLID  Oral Phase of Swallow (VFSS - Hard Solid): Within Functional Limits  Triggered at: Base of tongue, Valleculae  Residue Severity, Location: None  Laryngeal Penetration: None  Tracheal Aspiration: None  Penetration Aspiration Scale Score: Score 1: Material does not enter airway       Overall Impression:   Pt positioned upright in standard chair. Radiologist present in room and patient observed feeding himself po trials of thin via cup & straw, pureed and cracker consistencies. Results of exam revealed swallow mechanism within normal limites. Adequate oral retrieval and containment with timely mastication and transit. No significant oral residue post swallow. Pharyngeal response was timely with adequate base of tongue retraction/hyolaryngeal elevation resulting in no  significant pharyngeal residue. Epiglottic inversion with appropriate recoil and adequate layrngeal closure. No laryngeal penetration or aspiration noted.        EDUCATION/INSTRUCTION  Reviewed results and recommendations with patient/family/caregiver.  Agreement/Understanding verbalized and all questions answered to their apparent satisfaction.    INTERDISCIPLINARY COMMUNICATION  Reviewed results with Radiologist; agreement verbalized.    Patient Experiencing Pain: No       Thank you for your referral.   If you have any questions, please contact Antonina NARANJO, SLP    Antonina Bain MA, CCC-SLP  Pager [phone]

## 2025-04-04 NOTE — TELEPHONE ENCOUNTER
Called pt and provided the below recommendation. Patient confirmed he has not tried MTF and is agreeable to trying. Script sent as written. Patient advised to call the office with any SE or change in BG.

## 2025-04-04 NOTE — TELEPHONE ENCOUNTER
Received fax from [place] in regards to Mounjaro 5 MG/0.5ML. Medication has been denied because you have not tried metformin first. [employer] message sent to pt, denial letter placed in denial folder, and message routed to provider.    Case # - [id number]

## 2025-04-10 ENCOUNTER — OFFICE VISIT (OUTPATIENT)
Dept: FAMILY MEDICINE CLINIC | Facility: CLINIC | Age: 60
End: 2025-04-10
Payer: COMMERCIAL

## 2025-04-10 VITALS
WEIGHT: 268 LBS | SYSTOLIC BLOOD PRESSURE: 150 MMHG | HEIGHT: 71 IN | RESPIRATION RATE: 20 BRPM | DIASTOLIC BLOOD PRESSURE: 62 MMHG | BODY MASS INDEX: 37.52 KG/M2 | HEART RATE: 92 BPM | OXYGEN SATURATION: 98 %

## 2025-04-10 DIAGNOSIS — R13.10 DYSPHAGIA, UNSPECIFIED TYPE: ICD-10-CM

## 2025-04-10 DIAGNOSIS — I10 PRIMARY HYPERTENSION: ICD-10-CM

## 2025-04-10 DIAGNOSIS — E10.42 TYPE 1 DIABETES MELLITUS WITH DIABETIC POLYNEUROPATHY (HCC): Primary | ICD-10-CM

## 2025-04-10 DIAGNOSIS — R05.3 CHRONIC COUGH: ICD-10-CM

## 2025-04-10 PROCEDURE — 3008F BODY MASS INDEX DOCD: CPT | Performed by: FAMILY MEDICINE

## 2025-04-10 PROCEDURE — 3077F SYST BP >= 140 MM HG: CPT | Performed by: FAMILY MEDICINE

## 2025-04-10 PROCEDURE — 99214 OFFICE O/P EST MOD 30 MIN: CPT | Performed by: FAMILY MEDICINE

## 2025-04-10 PROCEDURE — 3072F LOW RISK FOR RETINOPATHY: CPT | Performed by: FAMILY MEDICINE

## 2025-04-10 PROCEDURE — 3078F DIAST BP <80 MM HG: CPT | Performed by: FAMILY MEDICINE

## 2025-04-10 RX ORDER — LOSARTAN POTASSIUM AND HYDROCHLOROTHIAZIDE 12.5; 1 MG/1; MG/1
1 TABLET ORAL DAILY
Qty: 90 TABLET | Refills: 1 | Status: SHIPPED | OUTPATIENT
Start: 2025-04-10

## 2025-04-10 RX ORDER — PREDNISONE 20 MG/1
TABLET ORAL
Qty: 13 TABLET | Refills: 0 | Status: SHIPPED | OUTPATIENT
Start: 2025-04-10

## 2025-04-10 RX ORDER — OMEPRAZOLE 20 MG/1
CAPSULE, DELAYED RELEASE ORAL
Qty: 60 CAPSULE | Refills: 0 | Status: SHIPPED | OUTPATIENT
Start: 2025-04-10

## 2025-04-10 RX ORDER — ALBUTEROL SULFATE 90 UG/1
2 INHALANT RESPIRATORY (INHALATION) EVERY 6 HOURS PRN
Qty: 1 EACH | Refills: 1 | Status: SHIPPED | OUTPATIENT
Start: 2025-04-10

## 2025-04-10 NOTE — PROGRESS NOTES
Subjective:   Patient ID: Montrell Emerson is a 59 year old male.    Type 1 diabetes. Last A1c value was 7.6% done 3/12/2025.  Metformin is helping.  Sugar is going from 60-200s.    HTN.  Chronic.  No CP/SOB.  No symptoms.  No side effects     Swallowing issue and cough. Doesn't get worse with alcohol, spicy food, or any pizza pasta sauce.  No SOB, CP.      History/Other:   Review of Systems   All other systems reviewed and are negative.    Current Medications[1]  Allergies:Allergies[2]    Objective:   Physical Exam  Constitutional:       Appearance: Normal appearance. He is not ill-appearing.   Eyes:      General:         Right eye: No discharge.         Left eye: No discharge.      Conjunctiva/sclera: Conjunctivae normal.   Cardiovascular:      Rate and Rhythm: Normal rate and regular rhythm.      Heart sounds: No murmur heard.  Pulmonary:      Effort: Pulmonary effort is normal. No respiratory distress.      Breath sounds: Normal breath sounds. No wheezing.   Neurological:      Mental Status: He is alert.         Assessment & Plan:   F/U after 1 month for physical.    1. Type 1 diabetes mellitus with diabetic polyneuropathy (HCC)  Discuss with endocrinologist about semaglutide.  Consider increased dose when on prednisone and then lower dose after that.      2. Primary hypertension  Chronic, stable. CPM   Losartan/hydrochlorothiazide 100-12.5mg    3. Chronic cough  - omeprazole 20 MG Oral Capsule Delayed Release; 1 tab twice daily x 2 weeks, then 1 tab daily x 2 weeks  Dispense: 60 capsule; Refill: 0  - predniSONE 20 MG Oral Tab; 2 tab day 1-3, 1.5 tab day 4&5, 1 tab day 6, 0.5 tab day 7  Dispense: 13 tablet; Refill: 0  - albuterol (PROAIR HFA) 108 (90 Base) MCG/ACT Inhalation Aero Soln; Inhale 2 puffs into the lungs every 6 (six) hours as needed.  Dispense: 1 each; Refill: 1    4. Dysphagia, unspecified type  - omeprazole 20 MG Oral Capsule Delayed Release; 1 tab twice daily x 2 weeks, then 1 tab daily x 2  weeks  Dispense: 60 capsule; Refill: 0  - Gastro Referral - In [facility]    Meds This Visit:  Requested Prescriptions     Signed Prescriptions Disp Refills    omeprazole 20 MG Oral Capsule Delayed Release 60 capsule 0     Si tab twice daily x 2 weeks, then 1 tab daily x 2 weeks    predniSONE 20 MG Oral Tab 13 tablet 0     Si tab day 1-3, 1.5 tab day 4&5, 1 tab day 6, 0.5 tab day 7    albuterol (PROAIR HFA) 108 (90 Base) MCG/ACT Inhalation Aero Soln 1 each 1     Sig: Inhale 2 puffs into the lungs every 6 (six) hours as needed.    Losartan Potassium-HCTZ 100-12.5 MG Oral Tab 90 tablet 1     Sig: Take 1 tablet by mouth daily.       Imaging & Referrals:  GASTRO - INTERNAL         [1]   Current Outpatient Medications   Medication Sig Dispense Refill    omeprazole 20 MG Oral Capsule Delayed Release 1 tab twice daily x 2 weeks, then 1 tab daily x 2 weeks 60 capsule 0    predniSONE 20 MG Oral Tab 2 tab day 1-3, 1.5 tab day 4&5, 1 tab day 6, 0.5 tab day 7 13 tablet 0    albuterol (PROAIR HFA) 108 (90 Base) MCG/ACT Inhalation Aero Soln Inhale 2 puffs into the lungs every 6 (six) hours as needed. 1 each 1    Losartan Potassium-HCTZ 100-12.5 MG Oral Tab Take 1 tablet by mouth daily. 90 tablet 1    metFORMIN  MG Oral Tablet 24 Hr Take 1 tablet (500 mg total) by mouth daily with breakfast. 90 tablet 1    Insulin Pen Needle (PEN NEEDLES) 32G X 6 MM Does not apply Misc 1 each 4 (four) times daily. Use to inject insulin 4 times daily . 400 each 0    Continuous Glucose Sensor (DEXCOM G7 SENSOR) Does not apply Misc 1 each Every 10 days. Use as directed every 10 days 9 each 1    pregabalin (LYRICA) 25 MG Oral Cap Take 1 capsule (25 mg total) by mouth 2 (two) times daily. 180 capsule 1    levothyroxine 200 MCG Oral Tab TAKE 1 TABLET BY MOUTH ONCE DAILY BEFORE BREAKFAST 90 tablet 1    Glucose Blood (ONETOUCH VERIO) In Vitro Strip 200 strips by In Vitro route in the morning, at noon, and at bedtime. 300 strip 1    Glucose  Blood (CONTOUR NEXT TEST) In Vitro Strip Test 4-5 times daily 500 each 0    HUMALOG 100 UNIT/ML Injection Solution 150 Units by Insulin pump route daily. 40 mL 11    Continuous Blood Gluc Transmit (DEXCOM G6 TRANSMITTER) Does not apply Misc 1 each by Does not apply route every 3 (three) months. 1 each 1    ASCENSIA MICROFILL TEST In Vitro Strip TEST SIX TIMES DAILY 200 Strip 2   [2]   Allergies  Allergen Reactions    Bee Venom

## 2025-04-15 ENCOUNTER — TELEPHONE (OUTPATIENT)
Dept: FAMILY MEDICINE CLINIC | Facility: CLINIC | Age: 60
End: 2025-04-15

## 2025-04-15 NOTE — TELEPHONE ENCOUNTER
Jossy Rico, [id number]    Calling as patient picked up new script of Losartan 100-12.5 on 4/11/25 but is asking for refill on Losartan 50-12.5 - showing discontinued in system, please verify and call back to confirm 50-12.5 is not to refilled

## 2025-05-10 DIAGNOSIS — E03.9 HYPOTHYROIDISM, UNSPECIFIED TYPE: ICD-10-CM

## 2025-05-10 NOTE — TELEPHONE ENCOUNTER
Endocrine refill protocol for medications for hypothyroidism and hyperthyroidism    Protocol Criteria:  FAILED Reason: Abnormal labs    If all below requirements are met, send a 90-day supply with 1 refill per provider protocol.    Verify appointment with Endocrinology completed in the last 12 months or scheduled in the next 6 months.    Normal TSH result in the past 12 months   Review recent telephone encounters and mychart communications with patient to ensure a dose change has not occurred since last office visit that was not updated in the medication history list     Last completed office visit:3/12/2025 Melissa Ji MD   Last completed telemed visit: Visit date not found  Next scheduled Follow up:   Future Appointments   Date Time Provider Department Center   6/10/2025  4:45 PM [facility]   6/25/2025  5:00 PM [facility]      Last TSH result:   TSH   Date Value Ref Range Status   11/26/2024 0.24 (L) 0.40 - 4.50 mIU/L Final

## 2025-05-12 RX ORDER — LEVOTHYROXINE SODIUM 200 UG/1
200 TABLET ORAL
Qty: 90 TABLET | Refills: 0 | Status: SHIPPED | OUTPATIENT
Start: 2025-05-12

## 2025-05-25 DIAGNOSIS — R05.3 CHRONIC COUGH: ICD-10-CM

## 2025-05-27 RX ORDER — ALBUTEROL SULFATE 90 UG/1
2 INHALANT RESPIRATORY (INHALATION) EVERY 6 HOURS PRN
Qty: 9 G | Refills: 0 | Status: SHIPPED | OUTPATIENT
Start: 2025-05-27

## 2025-05-27 NOTE — TELEPHONE ENCOUNTER
A refill request was received for:  Requested Prescriptions     Pending Prescriptions Disp Refills    ALBUTEROL 108 (90 Base) MCG/ACT Inhalation Aero Soln [Pharmacy Med Name: Albuterol Sulfate  (90 Base) MCG/ACT Inhalation Aerosol Solution] 9 g 0     Sig: INHALE 2 PUFFS BY MOUTH EVERY 6 HOURS AS NEEDED       Last refill date:   4/10/25    Last office visit: 4/10/25    Follow up due:  Future Appointments   Date Time Provider Department Center   6/10/2025  4:45 PM Levi Regalado APRN [facility]   6/25/2025  5:00 PM Melissa Ji MD [facility]

## 2025-06-18 DIAGNOSIS — R05.3 CHRONIC COUGH: ICD-10-CM

## 2025-06-18 NOTE — TELEPHONE ENCOUNTER
A refill request was received for:  Requested Prescriptions     Pending Prescriptions Disp Refills    ALBUTEROL 108 (90 Base) MCG/ACT Inhalation Aero Soln [Pharmacy Med Name: Albuterol Sulfate  (90 Base) MCG/ACT Inhalation Aerosol Solution] 9 g 0     Sig: INHALE 2 PUFFS INTO LUNGS EVERY 6 HOURS AS NEEDED       Last refill date:   5/27/25    Last office visit: 4/10/25    Follow up due:  Future Appointments   Date Time Provider Department Center   8/23/2025  8:00 AM [facility] Phoenix [facility]

## 2025-06-21 RX ORDER — ALBUTEROL SULFATE 90 UG/1
2 INHALANT RESPIRATORY (INHALATION) EVERY 6 HOURS PRN
Qty: 9 G | Refills: 0 | Status: SHIPPED | OUTPATIENT
Start: 2025-06-21

## 2025-06-27 DIAGNOSIS — E10.9 TYPE 1 DIABETES MELLITUS WITHOUT COMPLICATION (HCC): ICD-10-CM

## 2025-06-28 RX ORDER — INSULIN LISPRO 100 [IU]/ML
INJECTION, SOLUTION INTRAVENOUS; SUBCUTANEOUS
Qty: 30 ML | Refills: 0 | Status: SHIPPED | OUTPATIENT
Start: 2025-06-28

## 2025-06-28 NOTE — TELEPHONE ENCOUNTER
A refill request was received for:  Requested Prescriptions     Pending Prescriptions Disp Refills    HUMALOG 100 UNIT/ML Injection Solution [Pharmacy Med Name: HumaLOG 100 UNIT/ML Subcutaneous Solution] 30 mL 0     Sig:  UNITS BY INSULIN PUMP DAILY       Last refill date:6/17/2024       Last office visit:4/10/2025     Follow up due:  Future Appointments   Date Time Provider Department Center   8/23/2025  8:00 AM [facility]iel[facility]

## 2025-07-12 DIAGNOSIS — R05.3 CHRONIC COUGH: ICD-10-CM

## 2025-07-14 RX ORDER — ALBUTEROL SULFATE 90 UG/1
2 INHALANT RESPIRATORY (INHALATION) EVERY 6 HOURS PRN
Qty: 9 G | Refills: 0 | Status: SHIPPED | OUTPATIENT
Start: 2025-07-14

## 2025-07-14 NOTE — TELEPHONE ENCOUNTER
A refill request was received for:  Requested Prescriptions     Pending Prescriptions Disp Refills    ALBUTEROL 108 (90 Base) MCG/ACT Inhalation Aero Soln [Pharmacy Med Name: Albuterol Sulfate  (90 Base) MCG/ACT Inhalation Aerosol Solution] 9 g 0     Sig: INHALE 2 PUFFS BY MOUTH EVERY 6 HOURS AS NEEDED       Last refill date:   6/21/25    Last office visit: 4/10/25    Follow up due:  Future Appointments   Date Time Provider Department Center   8/23/2025  8:00 AM [facility]iel[facility] Phoenix [facility]

## 2025-07-20 DIAGNOSIS — E10.9 TYPE 1 DIABETES MELLITUS WITHOUT COMPLICATION (HCC): ICD-10-CM

## 2025-07-21 ENCOUNTER — TELEPHONE (OUTPATIENT)
Dept: FAMILY MEDICINE CLINIC | Facility: CLINIC | Age: 60
End: 2025-07-21

## 2025-07-21 RX ORDER — INSULIN LISPRO 100 [IU]/ML
150 INJECTION, SOLUTION INTRAVENOUS; SUBCUTANEOUS DAILY
Qty: 135 ML | Refills: 0 | Status: SHIPPED | OUTPATIENT
Start: 2025-07-21

## 2025-07-21 NOTE — TELEPHONE ENCOUNTER
.A refill request was received for:  Requested Prescriptions     Pending Prescriptions Disp Refills    HUMALOG 100 UNIT/ML Injection Solution 30 mL 0       Last refill date:   6/28/25    Last office visit: 4/10/25    Follow up due:  Future Appointments   Date Time Provider Department Center   8/23/2025  8:00 AM Vielka Phoenix MD [facility]

## 2025-07-21 NOTE — TELEPHONE ENCOUNTER
Patient called stating that his insurance is denying his HUMALOG 100 UNIT/ML Injection Solution insulin and he ran out of his medication on 07/20/25.    Patient is asking if he can get another insulin prescribed to him.    Please call patient [phone] for more detail information.    Please advise

## 2025-07-22 ENCOUNTER — OCC HEALTH (OUTPATIENT)
Dept: OCCUPATIONAL MEDICINE | Age: 60
End: 2025-07-22
Attending: PHYSICIAN ASSISTANT

## 2025-07-22 ENCOUNTER — HOSPITAL ENCOUNTER (OUTPATIENT)
Dept: GENERAL RADIOLOGY | Age: 60
Discharge: HOME | End: 2025-07-22
Attending: PHYSICIAN ASSISTANT

## 2025-07-22 DIAGNOSIS — S60.221A CONTUSION OF RIGHT HAND, INITIAL ENCOUNTER: ICD-10-CM

## 2025-07-22 DIAGNOSIS — S60.221A CONTUSION OF RIGHT HAND, INITIAL ENCOUNTER: Primary | ICD-10-CM

## 2025-07-22 PROCEDURE — 73130 X-RAY EXAM OF HAND: CPT | Performed by: PHYSICIAN ASSISTANT

## 2025-07-22 NOTE — TELEPHONE ENCOUNTER
I called [facility] on this    They said initially humalog needed a PA but when they ran the rx sent yesterday it went through and says approved?    Nothing needed on our end, said ins will only cover 30 ml at a time vs the 135ml that was sent. They will keep remaining vials on file    Per chart pt seeing endo 8/23

## 2025-07-22 NOTE — TELEPHONE ENCOUNTER
If I am reading the notes below--the issue is HUMALOG is not covered, no mention of alternative but sounds like he needs one vs what was sent (humalog was re-sent it looks)    I checked with [facility] to see if we can try novolog as this is equiv. He is ok with this. I offered to call pharmacy to confirm this is preferred alternative but they are not open yet.

## 2025-08-08 DIAGNOSIS — R05.3 CHRONIC COUGH: ICD-10-CM

## 2025-08-08 RX ORDER — ALBUTEROL SULFATE 90 UG/1
2 INHALANT RESPIRATORY (INHALATION) EVERY 6 HOURS PRN
Qty: 9 G | Refills: 0 | Status: SHIPPED | OUTPATIENT
Start: 2025-08-08

## 2025-08-12 ENCOUNTER — PATIENT MESSAGE (OUTPATIENT)
Dept: FAMILY MEDICINE CLINIC | Facility: CLINIC | Age: 60
End: 2025-08-12

## 2025-08-12 ENCOUNTER — TELEPHONE (OUTPATIENT)
Dept: ENDOCRINOLOGY CLINIC | Facility: CLINIC | Age: 60
End: 2025-08-12

## 2025-08-23 ENCOUNTER — OFFICE VISIT (OUTPATIENT)
Dept: ENDOCRINOLOGY CLINIC | Facility: CLINIC | Age: 60
End: 2025-08-23

## 2025-08-23 VITALS — WEIGHT: 265 LBS | HEIGHT: 71 IN | BODY MASS INDEX: 37.1 KG/M2

## 2025-08-23 DIAGNOSIS — E66.812 CLASS 2 OBESITY WITH BODY MASS INDEX (BMI) OF 36.0 TO 36.9 IN ADULT, UNSPECIFIED OBESITY TYPE, UNSPECIFIED WHETHER SERIOUS COMORBIDITY PRESENT: ICD-10-CM

## 2025-08-23 DIAGNOSIS — E10.649 UNCONTROLLED TYPE 1 DIABETES MELLITUS WITH HYPOGLYCEMIA WITHOUT COMA (HCC): ICD-10-CM

## 2025-08-23 DIAGNOSIS — E10.65 UNCONTROLLED TYPE 1 DIABETES MELLITUS WITH HYPERGLYCEMIA (HCC): Primary | ICD-10-CM

## 2025-08-23 DIAGNOSIS — I10 HYPERTENSION, UNSPECIFIED TYPE: ICD-10-CM

## 2025-08-23 DIAGNOSIS — E78.5 DYSLIPIDEMIA: ICD-10-CM

## 2025-08-23 DIAGNOSIS — E03.9 HYPOTHYROIDISM, UNSPECIFIED TYPE: ICD-10-CM

## 2025-08-23 PROCEDURE — 99215 OFFICE O/P EST HI 40 MIN: CPT | Performed by: INTERNAL MEDICINE

## 2025-08-23 PROCEDURE — 3008F BODY MASS INDEX DOCD: CPT | Performed by: INTERNAL MEDICINE

## 2025-08-23 PROCEDURE — 95251 CONT GLUC MNTR ANALYSIS I&R: CPT | Performed by: INTERNAL MEDICINE

## 2025-08-23 RX ORDER — LEVOTHYROXINE SODIUM 150 UG/1
150 TABLET ORAL
Qty: 90 TABLET | Refills: 3 | Status: SHIPPED | OUTPATIENT
Start: 2025-08-23

## 2025-08-23 RX ORDER — METFORMIN HYDROCHLORIDE 500 MG/1
500 TABLET, EXTENDED RELEASE ORAL
Qty: 90 TABLET | Refills: 1 | Status: SHIPPED | OUTPATIENT
Start: 2025-08-23

## 2025-08-23 RX ORDER — ROSUVASTATIN CALCIUM 10 MG/1
10 TABLET, COATED ORAL NIGHTLY
Qty: 90 TABLET | Refills: 0 | Status: SHIPPED | OUTPATIENT
Start: 2025-08-23 | End: 2025-11-21

## 2025-08-23 RX ORDER — INSULIN LISPRO 100 [IU]/ML
160 INJECTION, SOLUTION INTRAVENOUS; SUBCUTANEOUS DAILY
Qty: 150 ML | Refills: 3 | Status: SHIPPED | OUTPATIENT
Start: 2025-08-23

## 2025-08-23 RX ORDER — BLOOD SUGAR DIAGNOSTIC
200 STRIP MISCELLANEOUS 3 TIMES DAILY
Qty: 300 STRIP | Refills: 1 | Status: SHIPPED | OUTPATIENT
Start: 2025-08-23

## 2025-08-23 RX ORDER — ACYCLOVIR 400 MG/1
1 TABLET ORAL
Qty: 9 EACH | Refills: 1 | Status: SHIPPED | OUTPATIENT
Start: 2025-08-23

## 2025-08-23 RX ORDER — BLOOD SUGAR DIAGNOSTIC
1 STRIP MISCELLANEOUS 4 TIMES DAILY
Qty: 400 EACH | Refills: 0 | Status: SHIPPED | OUTPATIENT
Start: 2025-08-23

## 2025-08-24 LAB
CREATININE, RANDOM URINE: 173 MG/DL (ref 20–320)
MICROALBUMIN/CREATININE RATIO, RANDOM URINE: 10 MG/G CREAT
MICROALBUMIN: 1.7 MG/DL

## 2025-08-25 LAB
ALBUMIN/GLOBULIN RATIO: 1.9 (CALC) (ref 1–2.5)
ALBUMIN: 4.2 G/DL (ref 3.6–5.1)
ALKALINE PHOSPHATASE: 135 U/L (ref 35–144)
ALT: 19 U/L (ref 9–46)
AST: 19 U/L (ref 10–35)
BILIRUBIN, TOTAL: 0.7 MG/DL (ref 0.2–1.2)
BUN: 20 MG/DL (ref 7–25)
CALCIUM: 9.4 MG/DL (ref 8.6–10.3)
CARBON DIOXIDE: 28 MMOL/L (ref 20–32)
CHLORIDE: 108 MMOL/L (ref 98–110)
CHOL/HDLC RATIO: 3.9 (CALC)
CHOLESTEROL, TOTAL: 224 MG/DL
CREATININE: 1.06 MG/DL (ref 0.7–1.3)
EGFR: 81 ML/MIN/1.73M2
GLOBULIN: 2.2 G/DL (CALC) (ref 1.9–3.7)
GLUCOSE: 95 MG/DL (ref 65–99)
HDL CHOLESTEROL: 58 MG/DL
LDL-CHOLESTEROL: 148 MG/DL (CALC)
MAGNESIUM: 2.2 MG/DL (ref 1.5–2.5)
NON-HDL CHOLESTEROL: 166 MG/DL (CALC)
PARATHYROID HORMONE,$INTACT: 57 PG/ML (ref 16–77)
POTASSIUM: 4.2 MMOL/L (ref 3.5–5.3)
PROTEIN, TOTAL: 6.4 G/DL (ref 6.1–8.1)
SODIUM: 143 MMOL/L (ref 135–146)
T4, FREE: 1.4 NG/DL (ref 0.8–1.8)
TRIGLYCERIDES: 79 MG/DL
TSH: 0.18 MIU/L (ref 0.4–4.5)
VITAMIN B12: 326 PG/ML (ref 200–1100)
VITAMIN D, 25-OH, TOTAL: 39 NG/ML (ref 30–100)

## (undated) DEVICE — SOLUTION IRRIG 1000ML 0.9% NACL USP BTL

## (undated) DEVICE — GAMMEX® PI HYBRID SIZE 7.5, STERILE POWDER-FREE SURGICAL GLOVE, POLYISOPRENE AND NEOPRENE BLEND: Brand: GAMMEX

## (undated) DEVICE — HEAD & NECK: Brand: MEDLINE INDUSTRIES, INC.

## (undated) DEVICE — NECK ACCESSORY: Brand: MEDLINE INDUSTRIES, INC.

## (undated) DEVICE — ADHESIVE SKIN TOP FOR WND CLSR DERMBND ADV

## (undated) DEVICE — SUTURE CHROMIC GUT SZ 3-0 L27IN ABSRB UD

## (undated) DEVICE — SUT SILK 2-0 BLK 17-18 45CM

## (undated) DEVICE — SUCTION CANISTER, 3000CC,SAFELINER: Brand: DEROYAL

## (undated) DEVICE — HM34SPU @ HOVERMATT, SNGL USE, 34X78: Brand: MEDLINE

## (undated) NOTE — LETTER
Date: 4/28/2022    Patient Name: Varun Montoya          To Whom it may concern: The above patient was seen at the [facility] for treatment of a medical condition. This patient should be excused from attending work from 4/25/22 through 4/30/22. The patient may return to work on 5/2/22 with mask.         Sincerely,    Dr. Jason Fernandez, DO

## (undated) NOTE — LETTER
Date: 2/20/2024    Patient Name: Montrell Emerson          To Whom it may concern:    This letter has been written at the patient's request. The above patient was seen at the [facility] for treatment of a medical condition.    This patient should be excused from attending work from 2/19/24 through 2/25/24.        Sincerely,      Dr. Bertrand Mariscal, DO

## (undated) NOTE — LETTER
Date: 3/1/2024    Patient Name: Montrell Emerson          To Whom it may concern:    The above patient was seen at [facility] for treatment of a medical condition.    This patient should be excused from attending work from 2/19/24 through 3/3/24.    The patient may return to work on 3/4/24, but allow him 5 mins rest whenever feeling short of breath until 4/4/24.        Sincerely,    Dr. Bertrand Mariscal, DO

## (undated) NOTE — MR AVS SNAPSHOT
[address]  [address] [facility], [address] [address] [phone]               Thank you for choosing us for your health care visit with Renetta Norris DO.   We are glad to serve you and happy to provide you with this givens swell and thicken. Or the tendon itself may thicken. Then the sheath pinches the tendon, and the tendon can no longer slide easily inside the sheath.  When you straighten your finger, the tendon sticks or “locks” as it tries to squeeze back through the shea called a sheath. During surgery, the sheath in your finger or thumb is opened to enlarge the space and release the swollen tendon. This allows the finger or thumb to bend and straighten normally. Surgery takes about 20 minutes.  It can often be done using a If you have questions, you can call [phone] to talk to our [facility] Staff. Remember, Vericept is NOT to be used for urgent needs. For medical emergencies, dial 911. Visit [email or web address] to learn more.            Visit [email or web address]

## (undated) NOTE — LETTER
AUTHORIZATION FOR SURGICAL OPERATION OR OTHER PROCEDURE    1. I hereby authorize Leigh Ann Harrison, and [facility] staff assigned to my case to perform the following operation and/or procedure at the [facility]:    Needle drainage of neck    2. My physician has explained the nature and purpose of the operation or other procedure, possible alternative methods of treatment, the risks involved, and the possibility of complication to me. I acknowledge that no guarantee has been made as to the result that may be obtained. 3.  I recognize that, during the course of this operation, or other procedure, unforseen conditions may necessitate additional or different procedure than those listed above. I, therefore, further authorize and request that the above named physician, his/her physician assistants or designees perform such procedures as are, in his/her professional opinion, necessary and desirable. 4.  Any tissue or organs removed in the operation or other procedure may be disposed of by and at the discretion of the [place] [employer] [facility] and [facility]. 5.  I understand that in the event of a medical emergency, I will be transported by local paramedics to [facility] or other hospital emergency department. 6.  I certify that I have read and fully understand the above consent to operation and/or other procedure. 7.  I acknowledge that my physician has explained sedation/analgesia administration to me including the risks and benefits. I consent to the administration of sedation/analgesia as may be necessary or desirable in the judgement of my physician. Witness signature: ___________________________________________________ Date:  ______/______/_____                    Time:  ________ A. M.  P.M.        Patient Name:  ______________________________________________________  (please print)      Patient signature: ___________________________________________________             Relationship to Patient:           []  Parent    Responsible person                          []  Spouse  In case of minor or                    [] Other  _____________   Incompetent name:  __________________________________________________                               (please print)      _____________      Responsible person  In case of minor or  Incompetent signature:  _______________________________________________    Statement of Physician  My signature below affirms that prior to the time of the procedure, I have explained to the patient and/or his/her guardian, the risks and benefits involved in the proposed treatment and any reasonable alternative to the proposed treatment. I have also explained the risks and benefits involved in the refusal of the proposed treatment and have answered the patient's questions.                         Date:  ______/______/_______  Provider                      Signature:  __________________________________________________________       Time:  ___________ A.M    P.M.

## (undated) NOTE — LETTER
Date: 2/23/2024    Patient Name: Montrell Emerson          To Whom it may concern:    The above patient was seen at the [facility] for treatment of a medical condition.    This patient should be continue to be excused from attending work until 3/2/24    The patient may return to work on 3/4/24 without restriction      Sincerely,    Dr. Bertrand Mariscal, DO

## (undated) NOTE — LETTER
11/2/2023          To Whom It May Concern:    Parul Mccormick is currently under my medical care and may not return to work at this time. He had surgery on 10/30/23 and will require 2 weeks for post operative recovery. Please excuse Aida Colorado from 10/30/23-11/13/23. He may return to work on 11/14/23. If you require additional information please contact our office.         Sincerely,    Dale Cali MD

## (undated) NOTE — MR AVS SNAPSHOT
[address]  [address] [address] [phone]               Thank you for choosing us for your health care visit with Alyce Huntr, .   We are glad to serve you and happy to provide you with this givens * Levothyroxine Sodium 175 MCG Tabs   TK 1 T PO  D   Commonly known as:  SYNTHROID, LEVOTHROID           NOVOLOG FLEXPEN 100 UNIT/ML Soln   Generic drug:  insulin aspart   Inject  into the skin 3 (three) times daily before meals.  Indications: Insulin-Depe Tips for increasing your physical activity – Adults who are physically active are less likely to develop some chronic diseases than adults who are inactive.      HOW TO GET STARTED: HOW TO STAY MOTIVATED:   Start activities slowly and build up over time Do

## (undated) NOTE — LETTER
12/21/21        Lynn Emerson  [facility] [address]      Dear Mirta Hernandez records indicate that you have outstanding lab work and or testing that was ordered for you and has not yet been completed:  Orders Placed This Encounter      Hem

## (undated) NOTE — LETTER
ASTHMA ACTION PLAN for Montrell Emerson     : 1965     Date: 04/10/25  Doctor:  Bertrand Mariscal DO  Phone for doctor or clinic: [facility], [address] [place] [id number] [phone]      ACT Score: 15    ACT Goal: 20 or greater    Call your provider if you require your rescue/quick reliever medication more than 2-3 times in a 24 hour period.    If you require your rescue inhaler/medication more than 2-3 times weekly, your asthma may not be under proper control and you should seek medical attention.    *Quick Relievers are Xopenex and Albuterol*    You can use the colors of a traffic light to help learn about your asthma medicines.  Year Round       1. Green - Go! % of Personal Best Peak Flow   Use controller medicine.   Breathing is good  No cough or wheeze  Can work and play Medicine How much to take When to take it    Medications       Sympathomimetics Instructions     albuterol (PROAIR HFA) 108 (90 Base) MCG/ACT Inhalation Aero Soln Inhale 2 puffs into the lungs every 6 (six) hours as needed.                    2. Yellow - Caution. 50-79% Personal Best Peak Flow  Use reliever medicine to keep an asthma attack from getting bad.   Cough  Quick Relievers  Wheezing  Tight Chest  Wake up at night Medicine How much to take When to take it    If symptoms are not improving in 24-48 hrs, call office for further instructions  Medications       Sympathomimetics Instructions     albuterol (PROAIR HFA) 108 (90 Base) MCG/ACT Inhalation Aero Soln Inhale 2 puffs into the lungs every 6 (six) hours as needed.                    3. Red - Stop! Danger! <50% Personal Best Peak Flow  Continue Controller Medications But ADD:   Medicine not helping  Breathing is hard and fast  Nose opens wide  Can't walk  Ribs show  Can't talk well Medicine How much to take When to take it    If your symptoms do not improve in ONE hour -  go to the emergency room or call [phone]  immediately! If symptoms improve, call office for appointment immediately.    Albuterol inhaler 2 puffs every 20 minutes for three treatments       Don't forget:  Rinse mouth after using inhaler  Use spacer for inhaler  Remember to get your Flu vaccine every fall!    [x] Asthma Action Plan reviewed with the caregiver and patient, and a copy of the plan was given to the patient/caregiver.   [] Asthma Action Plan reviewed with the caregiver and patient on the phone, and copy mailed to patient/caregiver or sent via [employer].     Signatures:   Provider  Bertrand Mariscal DO Patient  Montrell Emerson Caretaker

## (undated) NOTE — ED AVS SNAPSHOT
Mr. Toya Currie   MRN: [id number]    Department:  [facility] Emergency Department   Date of Visit:  1/14/2020           Disclosure     Insurance plans vary and the physician(s) referred by the ER may not be covered by your plan.  Please contact tell this physician (or your personal doctor if your instructions are to return to your personal doctor) about any new or lasting problems. The primary care or specialist physician will see patients referred from the [facility] Emergency Department.  Manuel Dickson

## (undated) NOTE — LETTER
4/28/2021              Amdao Emerson        [address] [place] [address] Drive        [facility] [address]         To Whom It May Concern,    I am writing this letter to confirm that it is medically necessary for this patient to have a T: Slim Control IQ Insulin Pump.  Valentin Hannon

## (undated) NOTE — LETTER
Date & Time: 1/10/2022, 11:16 AM  Patient: Jessenia Lawson  Encounter Provider(s):    Ramez Montalvo MD       To Whom It May Concern:    Alirio Reed was seen and treated in our department on 1/10/2022. He can return to work.     If you have any ques

## (undated) NOTE — LETTER
Date & Time: 1/15/2020, 12:21 AM  Patient: Karin Gastelum  Encounter Provider(s):    Samm Chu MD       To Whom It May Concern:    Lluvia Granda was seen and treated in our department on 1/14/2020. He may return to work Monday 1/20/2020.     If you h

## (undated) NOTE — LETTER
06/18/20        Gabrielle Emerson  [facility] [address]      Dear Louie Velasquez records indicate that you have outstanding lab work and or testing that was ordered for you and has not yet been completed:  Orders Placed This Encounter      TSH

## (undated) NOTE — LETTER
07/14/17        [facility]indy [facility]      Dear Marcos Michaels records indicate that you have outstanding lab work and or testing that was ordered for you and has not yet been completed:          Vitamin D, 25-Hydroxy [E]